# Patient Record
Sex: FEMALE | Race: WHITE | NOT HISPANIC OR LATINO | Employment: FULL TIME | URBAN - METROPOLITAN AREA
[De-identification: names, ages, dates, MRNs, and addresses within clinical notes are randomized per-mention and may not be internally consistent; named-entity substitution may affect disease eponyms.]

---

## 2017-02-17 ENCOUNTER — OFFICE VISIT (OUTPATIENT)
Dept: CARDIOLOGY | Facility: CLINIC | Age: 52
End: 2017-02-17
Payer: COMMERCIAL

## 2017-02-17 VITALS
HEART RATE: 70 BPM | RESPIRATION RATE: 16 BRPM | BODY MASS INDEX: 35.61 KG/M2 | SYSTOLIC BLOOD PRESSURE: 142 MMHG | HEIGHT: 67 IN | OXYGEN SATURATION: 97 % | WEIGHT: 226.88 LBS | DIASTOLIC BLOOD PRESSURE: 80 MMHG

## 2017-02-17 DIAGNOSIS — I44.7 LBBB (LEFT BUNDLE BRANCH BLOCK): ICD-10-CM

## 2017-02-17 DIAGNOSIS — E66.09 NON MORBID OBESITY DUE TO EXCESS CALORIES: ICD-10-CM

## 2017-02-17 DIAGNOSIS — I10 ESSENTIAL HYPERTENSION: Primary | ICD-10-CM

## 2017-02-17 PROCEDURE — 99215 OFFICE O/P EST HI 40 MIN: CPT | Mod: S$GLB,,, | Performed by: INTERNAL MEDICINE

## 2017-02-17 PROCEDURE — 3077F SYST BP >= 140 MM HG: CPT | Mod: S$GLB,,, | Performed by: INTERNAL MEDICINE

## 2017-02-17 PROCEDURE — 99999 PR PBB SHADOW E&M-EST. PATIENT-LVL III: CPT | Mod: PBBFAC,,, | Performed by: INTERNAL MEDICINE

## 2017-02-17 PROCEDURE — 3079F DIAST BP 80-89 MM HG: CPT | Mod: S$GLB,,, | Performed by: INTERNAL MEDICINE

## 2017-02-17 NOTE — PROGRESS NOTES
[facility]    CC: Establish Care    Patient ID: Kwasi Brown is a 51 y.o. female with a past medical history of LBBB, HTN, obesity, who presents for an initial appointment.  She was followed by Dr. Coffman in the past, and now wishes to establish care with me.  Pertinent history events include:     -Feeling very well since her last visit with Dr. Coffman in 2015.  Works out at [employer] 5 days/week.    -Nuclear stress test is negative for ischemia.  EF:61% (10/11/13)  -Has known history of LBBB and HTN. She was seen in the past for these condtions by Dr German.     HPI:  Mrs. Brown states she's been doing well.  Continues to work out 5 days/week with no issues.  Reports no chest pain, SOB, LE edema, claudication, PND, TIA symptoms or syncope.  Taking all medications as prescribed with no issues.      Past Medical History   Diagnosis Date    Hypertension     Obesity      Past Surgical History   Procedure Laterality Date    Knee reconstruction, medial patellar femoral ligament       left     Social History     Social History    Marital status:      Spouse name: N/A    Number of children: N/A    Years of education: N/A     Occupational History     [employer]     Social History Main Topics    Smoking status: Former Smoker     Packs/day: 0.25     Years: 12.00     Quit date: 9/9/1983    Smokeless tobacco: Never Used    Alcohol use Yes      Comment: occasional    Drug use: No    Sexual activity: Not on file     Other Topics Concern    Not on file     Social History Narrative     Family History   Problem Relation Age of Onset    Coronary artery disease      Hypertension      Diabetes      Tuberculosis Mother     COPD Mother     Heart disease Mother 70    Hypertension Mother     Hypertension Father     Heart disease Father 78    Diabetes Father     Diabetes Brother 1     type 1    Heart disease Brother     Hypertension Brother        Review of patient's allergies  "indicates:  No Known Allergies    Medication List with Changes/Refills   Current Medications    BENAZEPRIL-HYDROCHLORTHIAZIDE (LOTENSIN HCT) 10-12.5 MG TAB    Take 1 tablet by mouth once daily.       Review of Systems  Constitution: Denies chills, fever, and sweats.  HENT: Denies headaches or blurry vision.  Cardiovascular: Denies chest pain or irregular heart beat.  Respiratory: Denies cough or shortness of breath.  Gastrointestinal: Denies abdominal pain, nausea, or vomiting.  Musculoskeletal: Denies muscle cramps.  Neurological: Denies dizziness or focal weakness.  Psychiatric/Behavioral: Normal mental status.  Hematologic/Lymphatic: Denies bleeding problem or easy bruising/bleeding.  Skin: Denies rash or suspicious lesions    Physical Examination  Visit Vitals    BP (!) 142/80    Pulse 70    Resp 16    Ht 5' 7" (1.702 m)    Wt 102.9 kg (226 lb 13.7 oz)    SpO2 97%    BMI 35.53 kg/m2       Constitutional: No acute distress, conversant  HEENT: Sclera anicteric, Pupils equal, round and reactive to light, extraocular motions intact, Oropharynx clear  Neck: No JVD, no carotid bruits  Cardiovascular: regular rate and rhythm, no murmur, rubs or gallops, normal S1/S2  Pulmonary: Clear to auscultation bilaterally  Abdominal: Abdomen soft, nontender, nondistended, positive bowel sounds  Extremities: No lower extremity edema   Pulses:  Carotid pulses are 2+ on the right side, and 2+ on the left side.  Radial pulses are 2+ on the right side, and 2+ on the left side.   Femoral pulses are 2+ on the right side, and 2+ on the left side.  Skin: No ecchymosis, erythema, or ulcers  Psych: Alert and oriented x 3, appropriate affect  Neuro: CNII-XII intact, no focal deficits    Labs:  Most Recent Data  CBC:   Lab Results   Component Value Date    WBC 9.33 06/30/2006    HGB 13.4 06/30/2006    HCT 40.4 06/30/2006     06/30/2006    MCV 87.8 06/30/2006    RDW 14.1 06/30/2006     BMP:   Lab Results   Component Value Date    "  09/13/2013    K 4.6 09/13/2013     09/13/2013    CO2 28 09/13/2013    BUN 19 09/13/2013     09/13/2013    CALCIUM 9.7 09/13/2013    MG 2.2 10/06/2010     LFTS;   Lab Results   Component Value Date    PROT 7.2 09/13/2013    ALBUMIN 3.5 09/13/2013    BILITOT 0.4 09/13/2013    AST 16 09/13/2013    ALKPHOS 74 09/13/2013    ALT 17 09/13/2013     COAGS: No results found for: INR, PROTIME, PTT  FLP:   Lab Results   Component Value Date    CHOL 199 09/13/2013    HDL 62 09/13/2013    LDLCALC 125.2 09/13/2013    TRIG 59 09/13/2013    CHOLHDL 31.2 09/13/2013       EKG today:  Normal sinus rhythm  LBBB  LAD  Possible LAE    Echo 9/23/2013:  CONCLUSIONS     1 - Concentric hypertrophy.     2 - Moderately depressed left ventricular systolic function (EF 35-40%).     3 - Normal left ventricular diastolic function.     4 - Normal right ventricular systolic function .     5 - Mild mitral regurgitation.     6 - Mild tricuspid regurgitation.     Nuclear Stress Test 10/11/2013:  Nuclear Quantitative Functional Analysis:   LVEF: 61 % (normal is 55 - 69)  LVED Volume: 125 ml (normal is 60 - 98)  LVES Volume: 48 ml (normal is 20 - 42)    Impression: NORMAL MYOCARDIAL PERFUSION  1. The perfusion scan is free of evidence for myocardial ischemia or injury.   2. There is a mild to moderate intensity fixed defect in the anteroseptal wall of the left ventricle, secondary to breast attenuation.   3. Resting wall motion is physiologic.   4. Resting LV function is normal.  (normal is 55 - 69)  5. The ventricular volumes are normal at rest and stress.   6. The extracardiac distribution of radioactivity is normal.   7. When compared to the previous study from 09/03/2009, no significant changes noted.    Assessment/Plan:  Kwasi Brown is a 51 y.o. female with a past medical history of LBBB, HTN, obesity, who presents for an initial appointment.  Doing well currently with no symptoms.    -Continue current medications  -Schedule echo  and nuclear stress test   -Follow up in 6 months    Total duration of face to face visit time 45 minutes.  Total time spent counseling greater than fifty percent of total visit time.  Counseling included discussion regarding imaging findings, diagnosis, possibilities, treatment options, risks and benefits.  The patient had many questions regarding the options and long-term effects.    Kee Dunn MD, PhD  Interventional Cardiology

## 2017-02-17 NOTE — MR AVS SNAPSHOT
"    [facility]   [address]  [place] [address]  Phone: [phone]                  Kwasi Brown   2017 2:00 PM   Office Visit    Description:  Female : 1965   Provider:  Kee Dunn MD   Department:  [facility]           Reason for Visit     Consult           Diagnoses this Visit        Comments    Essential hypertension    -  Primary     LBBB (left bundle branch block)         Non morbid obesity due to excess calories         BMI 34.0-34.9,adult                To Do List           Goals (5 Years of Data)     None      Follow-Up and Disposition     Return in about 6 months (around 2017).      [facility]     [facility] On Call Nurse Care Line -  Assistance  Registered nurses in the [facility] On Call Center provide clinical advisement, health education, appointment booking, and other advisory services.  Call for this free service at [phone].             Medications           Message regarding Medications     Verify the changes and/or additions to your medication regime listed below are the same as discussed with your clinician today.  If any of these changes or additions are incorrect, please notify your healthcare provider.             Verify that the below list of medications is an accurate representation of the medications you are currently taking.  If none reported, the list may be blank. If incorrect, please contact your healthcare provider. Carry this list with you in case of emergency.           Current Medications     benazepril-hydrochlorthiazide (LOTENSIN HCT) 10-12.5 mg Tab Take 1 tablet by mouth once daily.           Clinical Reference Information           Your Vitals Were     BP Pulse Resp Height Weight SpO2    142/80 70 16 5' 7" (1.702 m) 102.9 kg (226 lb 13.7 oz) 97%    BMI                35.53 kg/m2          Blood Pressure          Most Recent Value    Right Arm BP - Sitting  142/80    Left Arm BP - Sitting  140/82 "    BP  (!)  142/80      Allergies as of 2/17/2017     No Known Allergies      Immunizations Administered on Date of Encounter - 2/17/2017     None      Orders Placed During Today's Visit     Future Labs/Procedures Expected by Expires    2D Echo w/ Color Flow Doppler  8/9/2017 2/17/2018    NM Multi Pharm Stress Cardiac Component  8/9/2017 2/17/2018    NM Myocardial Perfusion Spect Multi Pharmacologic  8/9/2017 2/17/2018      Instructions    Continue current medications  Echo and nuclear stress test   Follow up in 6 months       Language Assistance Services     ATTENTION: Language assistance services are available, free of charge. Please call [phone].      ATENCIÓN: Si habla español, tiene a mckenna disposición servicios gratuitos de asistencia lingüística. Llame al [phone].     CHÚ Ý: N?u b?n nói Ti?ng Vi?t, có các d?ch v? h? tr? ngôn ng? mi?n phí dành cho b?n. G?i s? [phone].         [facility] complies with applicable Federal civil rights laws and does not discriminate on the basis of race, color, national origin, age, disability, or sex.

## 2017-02-17 NOTE — LETTER
February 17, 2017      Wes Coffman MD  [address]  [address]  [place] [address]           [facility]  [address]  [place] [address]  Phone: [phone]          Patient: Kwasi Brown   MR Number: [id number]   YOB: 1965   Date of Visit: 2/17/2017       Dear Dr. Wes Coffman:    Thank you for referring Kwasi Brown to me for evaluation. Attached you will find relevant portions of my assessment and plan of care.    If you have questions, please do not hesitate to call me. I look forward to following Kwasi Brown along with you.    Sincerely,    Kee Dunn MD    Enclosure  CC:  No Recipients    If you would like to receive this communication electronically, please contact [email or web address] or [phone] to request more information on [facility] access.    For providers and/or their staff who would like to refer a patient to [facility], please contact us through our one-stop-shop provider referral line, [facility] , at [phone].    If you feel you have received this communication in error or would no longer like to receive these types of communications, please e-mail [email or web address]

## 2017-10-31 RX ORDER — BENAZEPRIL HYDROCHLORIDE AND HYDROCHLOROTHIAZIDE 10; 12.5 MG/1; MG/1
1 TABLET ORAL DAILY
Qty: 30 TABLET | Refills: 11 | Status: SHIPPED | OUTPATIENT
Start: 2017-10-31 | End: 2019-07-06 | Stop reason: SDUPTHER

## 2019-07-08 RX ORDER — BENAZEPRIL HYDROCHLORIDE AND HYDROCHLOROTHIAZIDE 10; 12.5 MG/1; MG/1
1 TABLET ORAL DAILY
Qty: 30 TABLET | Refills: 0 | Status: SHIPPED | OUTPATIENT
Start: 2019-07-08 | End: 2020-11-04 | Stop reason: SDUPTHER

## 2020-10-30 ENCOUNTER — PATIENT MESSAGE (OUTPATIENT)
Dept: CARDIOLOGY | Facility: CLINIC | Age: 55
End: 2020-10-30

## 2020-10-30 DIAGNOSIS — E78.5 HYPERLIPIDEMIA, UNSPECIFIED HYPERLIPIDEMIA TYPE: ICD-10-CM

## 2020-10-30 DIAGNOSIS — I10 HYPERTENSION, UNSPECIFIED TYPE: Primary | ICD-10-CM

## 2020-11-04 ENCOUNTER — OFFICE VISIT (OUTPATIENT)
Dept: CARDIOLOGY | Facility: CLINIC | Age: 55
End: 2020-11-04
Payer: COMMERCIAL

## 2020-11-04 VITALS
HEART RATE: 63 BPM | HEIGHT: 67 IN | RESPIRATION RATE: 16 BRPM | DIASTOLIC BLOOD PRESSURE: 84 MMHG | OXYGEN SATURATION: 99 % | WEIGHT: 236 LBS | BODY MASS INDEX: 37.04 KG/M2 | SYSTOLIC BLOOD PRESSURE: 134 MMHG

## 2020-11-04 DIAGNOSIS — I10 ESSENTIAL HYPERTENSION: ICD-10-CM

## 2020-11-04 DIAGNOSIS — E66.01 MORBID OBESITY: ICD-10-CM

## 2020-11-04 DIAGNOSIS — E78.5 DYSLIPIDEMIA: Primary | ICD-10-CM

## 2020-11-04 PROCEDURE — 99214 PR OFFICE/OUTPT VISIT, EST, LEVL IV, 30-39 MIN: ICD-10-PCS | Mod: S$GLB,,, | Performed by: INTERNAL MEDICINE

## 2020-11-04 PROCEDURE — 99214 OFFICE O/P EST MOD 30 MIN: CPT | Mod: S$GLB,,, | Performed by: INTERNAL MEDICINE

## 2020-11-04 PROCEDURE — 3079F DIAST BP 80-89 MM HG: CPT | Mod: CPTII,S$GLB,, | Performed by: INTERNAL MEDICINE

## 2020-11-04 PROCEDURE — 3008F BODY MASS INDEX DOCD: CPT | Mod: CPTII,S$GLB,, | Performed by: INTERNAL MEDICINE

## 2020-11-04 PROCEDURE — 3075F PR MOST RECENT SYSTOLIC BLOOD PRESS GE 130-139MM HG: ICD-10-PCS | Mod: CPTII,S$GLB,, | Performed by: INTERNAL MEDICINE

## 2020-11-04 PROCEDURE — 3075F SYST BP GE 130 - 139MM HG: CPT | Mod: CPTII,S$GLB,, | Performed by: INTERNAL MEDICINE

## 2020-11-04 PROCEDURE — 3008F PR BODY MASS INDEX (BMI) DOCUMENTED: ICD-10-PCS | Mod: CPTII,S$GLB,, | Performed by: INTERNAL MEDICINE

## 2020-11-04 PROCEDURE — 3079F PR MOST RECENT DIASTOLIC BLOOD PRESSURE 80-89 MM HG: ICD-10-PCS | Mod: CPTII,S$GLB,, | Performed by: INTERNAL MEDICINE

## 2020-11-04 RX ORDER — FACIAL-BODY WIPES
EACH TOPICAL
COMMUNITY
End: 2021-05-05

## 2020-11-04 RX ORDER — BENAZEPRIL HYDROCHLORIDE AND HYDROCHLOROTHIAZIDE 10; 12.5 MG/1; MG/1
1 TABLET ORAL DAILY
Qty: 90 TABLET | Refills: 3 | Status: SHIPPED | OUTPATIENT
Start: 2020-11-04 | End: 2021-11-29 | Stop reason: SDUPTHER

## 2020-11-04 RX ORDER — CETIRIZINE HYDROCHLORIDE 10 MG/1
10 TABLET ORAL DAILY
COMMUNITY

## 2020-11-04 RX ORDER — GLUCOSAMINE SULFATE 500 MG
TABLET ORAL
COMMUNITY

## 2020-11-04 RX ORDER — FLUTICASONE PROPIONATE 50 MCG
1 SPRAY, SUSPENSION (ML) NASAL DAILY
COMMUNITY

## 2020-11-04 NOTE — PROGRESS NOTES
Subjective:    Patient ID:  Kwasi Brown is a 55 y.o. female patient here for evaluation Hypertension (check up)      History of Present Illness:   Patient is here for follow-up evaluation she developed some seasonal allergies.  Otherwise no occurrence of any angina shortness of breath noted.  She has remained reasonably active with change in weather she is more susceptible for allergic rhinitis and cough.  No sputum production no fevers or chills at this time she does have scant mucoid sputum production blood pressure has been stable no headaches no double vision noted          Review of patient's allergies indicates:  No Known Allergies    Past Medical History:   Diagnosis Date    Hypertension     Obesity      Past Surgical History:   Procedure Laterality Date    KNEE RECONSTRUCTION, MEDIAL PATELLAR FEMORAL LIGAMENT      left     Social History     Tobacco Use    Smoking status: Former Smoker     Packs/day: 0.25     Years: 12.00     Pack years: 3.00     Quit date: 1983     Years since quittin.1    Smokeless tobacco: Never Used   Substance Use Topics    Alcohol use: Yes     Comment: occasional    Drug use: No        Review of Systems:    As noted in HPI in addition      REVIEW OF SYSTEMS  CARDIOVASCULAR: No recent chest pain, palpitations, arm, neck, or jaw pain  RESPIRATORY: No recent fever, cough chills, SOB or congestion  : No blood in the urine  GI: No Nausea, vomiting, constipation, diarrhea, blood, or reflux.  MUSCULOSKELETAL: No myalgias  NEURO: No lightheadedness or dizziness  EYES: No Double vision, blurry, vision or headache   Seasonal allergies with associated allergic rhinitis and postnasal congestion.           Objective:        Vitals:    20 1647   BP: 134/84   Pulse: 63   Resp: 16       Lab Results   Component Value Date    WBC 6.6 2020    HGB 14.1 2020    HCT 42.5 2020     2020    CHOL 249 (H) 2020    TRIG 72 2020    HDL 73  11/03/2020    ALT 14 11/03/2020    AST 12 11/03/2020     11/03/2020    K 4.3 11/03/2020     11/03/2020    CREATININE 0.55 11/03/2020    BUN 16 11/03/2020    CO2 31 11/03/2020    TSH 2.21 08/04/2009        ECHOCARDIOGRAM RESULTS  No results found for this or any previous visit.      CURRENT/PREVIOUS VISIT EKG  Results for orders placed or performed in visit on 10/20/15   IN OFFICE EKG 12-LEAD (to [place])    Collection Time: 10/20/15  7:27 AM    Narrative    Test Reason : I44.7    Vent. Rate : 054 BPM     Atrial Rate : 054 BPM     P-R Int : 190 ms          QRS Dur : 152 ms      QT Int : 472 ms       P-R-T Axes : 013 -51 095 degrees     QTc Int : 447 ms    Sinus bradycardia  Left axis deviation  Left bundle branch block  Abnormal ECG  When compared with ECG of 01-OCT-2014 09:50,  No significant change was found  Confirmed by Samm Mahan MD ([id number]) on 10/21/2015 5:10:06 PM    Referred By:             Confirmed By:Samm Mahan MD     No procedure found.   No results found for this or any previous visit.  No procedure found.    PHYSICAL EXAM  CONSTITUTIONAL: Well built, well nourished in no apparent distress  NECK: no carotid bruit, no JVD  LUNGS: CTA  CHEST WALL: no tenderness, pectus excavatum is present  HEART: regular rate and rhythm, S1, S2 normal, no murmur, click, rub or gallop   ABDOMEN: soft, non-tender; bowel sounds normal; no masses,  no organomegaly  EXTREMITIES: Extremities normal, no edema, no calf tenderness noted  NEURO: AAO X 3    I HAVE REVIEWED :    The vital signs, nurses notes, and all the pertinent radiology and labs.         Current Outpatient Medications   Medication Instructions    benazepril-hydrochlorthiazide (LOTENSIN HCT) 10-12.5 mg Tab 1 tablet, Oral, Daily    benazepril-hydrochlorthiazide (LOTENSIN HCT) 10-12.5 mg Tab 1 tablet, Oral, Daily    cetirizine (ZYRTEC) 10 mg, Oral, Daily    fluticasone propionate (FLONASE) 50 mcg/actuation nasal spray 1 spray, Each Nostril, Daily    lysine  1,000 mg Tab Oral    multivitamin capsule 1 capsule, Oral, Daily    om 3-E-linol-ala-oleic-gla-lip (OMEGA 3-6-9) 40-60-10 mg-mg-unit Cap Oral          Assessment:     Essential hypertension  2.  Left bundle branch block morphology  3.  Seasonal allergies and allergic rhinitis.  4.  Obesity with BMI of 36.96.  5.  Significant dyslipidemia      Plan:     Recommendations at this time is to continue on present regimen she does have high cholesterol with LDL cholesterol of 159 total cholesterol is 249 HDL is 73.  Non HDL cholesterol 182.  In addition to maintaining low-fat low-cholesterol diet and low-salt diet out encourage her to consider starting on a statin therapy.  Arm consider of Crestor for except effectiveness at low doses add start of with a 10 mg once a day and recheck her liver profile.  In the meanwhile strict diet and daily activity arm walking or incorporate some physical active exercise.  Patient at this time prefers to try diet and exercise prior to starting on Crestor therapy recheck lipids in 4 months time and defer initiating Crestor for the time being      No follow-ups on file.

## 2021-04-27 ENCOUNTER — PATIENT MESSAGE (OUTPATIENT)
Dept: CARDIOLOGY | Facility: CLINIC | Age: 56
End: 2021-04-27

## 2021-04-29 ENCOUNTER — PATIENT MESSAGE (OUTPATIENT)
Dept: RESEARCH | Facility: HOSPITAL | Age: 56
End: 2021-04-29

## 2021-04-30 ENCOUNTER — LAB VISIT (OUTPATIENT)
Dept: LAB | Facility: HOSPITAL | Age: 56
End: 2021-04-30
Attending: INTERNAL MEDICINE
Payer: COMMERCIAL

## 2021-04-30 DIAGNOSIS — E78.5 DYSLIPIDEMIA: ICD-10-CM

## 2021-04-30 LAB
CHOLEST SERPL-MCNC: 226 MG/DL (ref 120–199)
CHOLEST/HDLC SERPL: 2.9 {RATIO} (ref 2–5)
HDLC SERPL-MCNC: 78 MG/DL (ref 40–75)
HDLC SERPL: 34.5 % (ref 20–50)
LDLC SERPL CALC-MCNC: 139.2 MG/DL (ref 63–159)
NONHDLC SERPL-MCNC: 148 MG/DL
TRIGL SERPL-MCNC: 44 MG/DL (ref 30–150)

## 2021-04-30 PROCEDURE — 36415 COLL VENOUS BLD VENIPUNCTURE: CPT | Performed by: INTERNAL MEDICINE

## 2021-04-30 PROCEDURE — 80061 LIPID PANEL: CPT | Performed by: INTERNAL MEDICINE

## 2021-05-05 ENCOUNTER — OFFICE VISIT (OUTPATIENT)
Dept: CARDIOLOGY | Facility: CLINIC | Age: 56
End: 2021-05-05
Payer: COMMERCIAL

## 2021-05-05 VITALS
BODY MASS INDEX: 37.2 KG/M2 | SYSTOLIC BLOOD PRESSURE: 124 MMHG | WEIGHT: 237 LBS | HEIGHT: 67 IN | OXYGEN SATURATION: 98 % | DIASTOLIC BLOOD PRESSURE: 78 MMHG | HEART RATE: 73 BPM | RESPIRATION RATE: 16 BRPM

## 2021-05-05 DIAGNOSIS — I44.7 LBBB (LEFT BUNDLE BRANCH BLOCK): ICD-10-CM

## 2021-05-05 DIAGNOSIS — E66.01 MORBID OBESITY: ICD-10-CM

## 2021-05-05 DIAGNOSIS — I10 ESSENTIAL HYPERTENSION: ICD-10-CM

## 2021-05-05 DIAGNOSIS — E78.5 DYSLIPIDEMIA: Primary | ICD-10-CM

## 2021-05-05 PROCEDURE — 99214 PR OFFICE/OUTPT VISIT, EST, LEVL IV, 30-39 MIN: ICD-10-PCS | Mod: S$GLB,,, | Performed by: INTERNAL MEDICINE

## 2021-05-05 PROCEDURE — 3008F PR BODY MASS INDEX (BMI) DOCUMENTED: ICD-10-PCS | Mod: CPTII,S$GLB,, | Performed by: INTERNAL MEDICINE

## 2021-05-05 PROCEDURE — 1126F PR PAIN SEVERITY QUANTIFIED, NO PAIN PRESENT: ICD-10-PCS | Mod: S$GLB,,, | Performed by: INTERNAL MEDICINE

## 2021-05-05 PROCEDURE — 3008F BODY MASS INDEX DOCD: CPT | Mod: CPTII,S$GLB,, | Performed by: INTERNAL MEDICINE

## 2021-05-05 PROCEDURE — 1126F AMNT PAIN NOTED NONE PRSNT: CPT | Mod: S$GLB,,, | Performed by: INTERNAL MEDICINE

## 2021-05-05 PROCEDURE — 99214 OFFICE O/P EST MOD 30 MIN: CPT | Mod: S$GLB,,, | Performed by: INTERNAL MEDICINE

## 2021-09-12 ENCOUNTER — NURSE TRIAGE (OUTPATIENT)
Dept: ADMINISTRATIVE | Facility: CLINIC | Age: 56
End: 2021-09-12

## 2021-11-29 RX ORDER — BENAZEPRIL HYDROCHLORIDE AND HYDROCHLOROTHIAZIDE 10; 12.5 MG/1; MG/1
1 TABLET ORAL DAILY
Qty: 90 TABLET | Refills: 3 | Status: SHIPPED | OUTPATIENT
Start: 2021-11-29 | End: 2022-02-23

## 2022-02-23 ENCOUNTER — OFFICE VISIT (OUTPATIENT)
Dept: CARDIOLOGY | Facility: CLINIC | Age: 57
End: 2022-02-23
Payer: COMMERCIAL

## 2022-02-23 ENCOUNTER — LAB VISIT (OUTPATIENT)
Dept: LAB | Facility: HOSPITAL | Age: 57
End: 2022-02-23
Attending: NURSE PRACTITIONER

## 2022-02-23 VITALS
HEIGHT: 67 IN | SYSTOLIC BLOOD PRESSURE: 142 MMHG | BODY MASS INDEX: 37.67 KG/M2 | WEIGHT: 240 LBS | DIASTOLIC BLOOD PRESSURE: 92 MMHG | HEART RATE: 80 BPM

## 2022-02-23 DIAGNOSIS — E78.5 DYSLIPIDEMIA: ICD-10-CM

## 2022-02-23 DIAGNOSIS — I10 PRIMARY HYPERTENSION: ICD-10-CM

## 2022-02-23 DIAGNOSIS — I44.7 LBBB (LEFT BUNDLE BRANCH BLOCK): ICD-10-CM

## 2022-02-23 DIAGNOSIS — R06.09 DOE (DYSPNEA ON EXERTION): ICD-10-CM

## 2022-02-23 DIAGNOSIS — E66.01 MORBID OBESITY: ICD-10-CM

## 2022-02-23 LAB
ALBUMIN SERPL BCP-MCNC: 4 G/DL (ref 3.5–5.2)
ALP SERPL-CCNC: 60 U/L (ref 55–135)
ALT SERPL W/O P-5'-P-CCNC: 17 U/L (ref 10–44)
ANION GAP SERPL CALC-SCNC: 10 MMOL/L (ref 8–16)
AST SERPL-CCNC: 14 U/L (ref 10–40)
BILIRUB SERPL-MCNC: 0.7 MG/DL (ref 0.1–1)
BUN SERPL-MCNC: 18 MG/DL (ref 6–20)
CALCIUM SERPL-MCNC: 9.4 MG/DL (ref 8.7–10.5)
CHLORIDE SERPL-SCNC: 103 MMOL/L (ref 95–110)
CHOLEST SERPL-MCNC: 213 MG/DL (ref 120–199)
CHOLEST/HDLC SERPL: 2.7 {RATIO} (ref 2–5)
CO2 SERPL-SCNC: 27 MMOL/L (ref 23–29)
CREAT SERPL-MCNC: 0.8 MG/DL (ref 0.5–1.4)
EST. GFR  (AFRICAN AMERICAN): >60 ML/MIN/1.73 M^2
EST. GFR  (NON AFRICAN AMERICAN): >60 ML/MIN/1.73 M^2
GLUCOSE SERPL-MCNC: 116 MG/DL (ref 70–110)
HDLC SERPL-MCNC: 80 MG/DL (ref 40–75)
HDLC SERPL: 37.6 % (ref 20–50)
LDLC SERPL CALC-MCNC: 121.8 MG/DL (ref 63–159)
NONHDLC SERPL-MCNC: 133 MG/DL
POTASSIUM SERPL-SCNC: 4.3 MMOL/L (ref 3.5–5.1)
PROT SERPL-MCNC: 7.3 G/DL (ref 6–8.4)
SODIUM SERPL-SCNC: 140 MMOL/L (ref 136–145)
TRIGL SERPL-MCNC: 56 MG/DL (ref 30–150)

## 2022-02-23 PROCEDURE — 1160F RVW MEDS BY RX/DR IN RCRD: CPT | Mod: CPTII,S$GLB,, | Performed by: INTERNAL MEDICINE

## 2022-02-23 PROCEDURE — 36415 COLL VENOUS BLD VENIPUNCTURE: CPT | Performed by: NURSE PRACTITIONER

## 2022-02-23 PROCEDURE — 3008F PR BODY MASS INDEX (BMI) DOCUMENTED: ICD-10-PCS | Mod: CPTII,S$GLB,, | Performed by: INTERNAL MEDICINE

## 2022-02-23 PROCEDURE — 80061 LIPID PANEL: CPT | Performed by: NURSE PRACTITIONER

## 2022-02-23 PROCEDURE — 3080F PR MOST RECENT DIASTOLIC BLOOD PRESSURE >= 90 MM HG: ICD-10-PCS | Mod: CPTII,S$GLB,, | Performed by: INTERNAL MEDICINE

## 2022-02-23 PROCEDURE — 3077F PR MOST RECENT SYSTOLIC BLOOD PRESSURE >= 140 MM HG: ICD-10-PCS | Mod: CPTII,S$GLB,, | Performed by: INTERNAL MEDICINE

## 2022-02-23 PROCEDURE — 3080F DIAST BP >= 90 MM HG: CPT | Mod: CPTII,S$GLB,, | Performed by: INTERNAL MEDICINE

## 2022-02-23 PROCEDURE — 1159F PR MEDICATION LIST DOCUMENTED IN MEDICAL RECORD: ICD-10-PCS | Mod: CPTII,S$GLB,, | Performed by: INTERNAL MEDICINE

## 2022-02-23 PROCEDURE — 99214 PR OFFICE/OUTPT VISIT, EST, LEVL IV, 30-39 MIN: ICD-10-PCS | Mod: S$GLB,,, | Performed by: INTERNAL MEDICINE

## 2022-02-23 PROCEDURE — 1159F MED LIST DOCD IN RCRD: CPT | Mod: CPTII,S$GLB,, | Performed by: INTERNAL MEDICINE

## 2022-02-23 PROCEDURE — 80053 COMPREHEN METABOLIC PANEL: CPT | Performed by: NURSE PRACTITIONER

## 2022-02-23 PROCEDURE — 3008F BODY MASS INDEX DOCD: CPT | Mod: CPTII,S$GLB,, | Performed by: INTERNAL MEDICINE

## 2022-02-23 PROCEDURE — 99214 OFFICE O/P EST MOD 30 MIN: CPT | Mod: S$GLB,,, | Performed by: INTERNAL MEDICINE

## 2022-02-23 PROCEDURE — 3077F SYST BP >= 140 MM HG: CPT | Mod: CPTII,S$GLB,, | Performed by: INTERNAL MEDICINE

## 2022-02-23 PROCEDURE — 1160F PR REVIEW ALL MEDS BY PRESCRIBER/CLIN PHARMACIST DOCUMENTED: ICD-10-PCS | Mod: CPTII,S$GLB,, | Performed by: INTERNAL MEDICINE

## 2022-02-23 RX ORDER — BENAZEPRIL HYDROCHLORIDE AND HYDROCHLOROTHIAZIDE 20; 12.5 MG/1; MG/1
1 TABLET ORAL DAILY
Qty: 90 TABLET | Refills: 3 | Status: SHIPPED | OUTPATIENT
Start: 2022-02-23 | End: 2022-10-26 | Stop reason: SDUPTHER

## 2022-02-23 NOTE — ASSESSMENT & PLAN NOTE
I would suggest subtle increase in her blood pressure medicines to benazepril hydrochlorothiazide 20/12.5 mg once daily and continue to maintain on low-salt diet and gradual increase in exercise as tolerated.

## 2022-02-23 NOTE — PROGRESS NOTES
Subjective:    Patient ID:  Kwasi Brown is a 56 y.o. female patient here for evaluation Hypertension      History of Present Illness:     Patient is here for follow-up evaluation she has been under tremendous amount of stress the last 6-8 months with work and family related issues in house related issues.  She is coping really good despite all these difficulties.  Are she did develop arm some sciatic pain and low back pain she has been diagnosed with some scoliosis.  Intermittently she has some numbness in the right leg as well on 1 occasion she did notice markedly elevated blood pressure the accuracy is questionable on the arm the cuff itself she did not have any associated headaches visual disturbances no chest pain or shortness of breath.  Subsequent pressures have improved.  Arm currently no nausea vomiting no blood in the stools no black stools are noted.  No headaches or visual changes.        Review of patient's allergies indicates:  No Known Allergies    Past Medical History:   Diagnosis Date    Hypertension     Obesity      Past Surgical History:   Procedure Laterality Date    KNEE RECONSTRUCTION, MEDIAL PATELLAR FEMORAL LIGAMENT      left     Social History     Tobacco Use    Smoking status: Former Smoker     Packs/day: 0.25     Years: 12.00     Pack years: 3.00     Quit date: 1983     Years since quittin.4    Smokeless tobacco: Never Used   Substance Use Topics    Alcohol use: Yes     Comment: occasional    Drug use: No        Review of Systems:    As noted in HPI in addition      REVIEW OF SYSTEMS  CARDIOVASCULAR: No recent chest pain, palpitations, arm, neck, or jaw pain  RESPIRATORY: No recent fever, cough chills, SOB or congestion  : No blood in the urine  GI: No Nausea, vomiting, constipation, diarrhea, blood, or reflux.  MUSCULOSKELETAL: No myalgias  NEURO: No lightheadedness or dizziness  EYES: No Double vision, blurry, vision or headache   Are periodic sinus congestion  noted with change in weather and pressure.           Objective        Vitals:    02/23/22 1533   BP: (!) 142/92   Pulse: 80       LIPIDS - LAST 2   Lab Results   Component Value Date    CHOL 213 (H) 02/23/2022    CHOL 226 (H) 04/30/2021    HDL 80 (H) 02/23/2022    HDL 78 (H) 04/30/2021    LDLCALC 121.8 02/23/2022    LDLCALC 139.2 04/30/2021    TRIG 56 02/23/2022    TRIG 44 04/30/2021    CHOLHDL 37.6 02/23/2022    CHOLHDL 34.5 04/30/2021       CBC - LAST 2  Lab Results   Component Value Date    WBC 6.6 11/03/2020    WBC 9.33 06/30/2006    RBC 4.93 11/03/2020    RBC 4.60 06/30/2006    HGB 14.1 11/03/2020    HGB 13.4 06/30/2006    HCT 42.5 11/03/2020    HCT 40.4 06/30/2006    MCV 86.2 11/03/2020    MCV 87.8 06/30/2006    MCH 28.6 11/03/2020    MCH 29.1 06/30/2006    MCHC 33.2 11/03/2020    MCHC 33.2 06/30/2006    RDW 13.1 11/03/2020    RDW 14.1 06/30/2006     11/03/2020     06/30/2006    MPV 10.8 11/03/2020    MPV 11.2 06/30/2006    GRAN 6.7 06/30/2006    GRAN 71.8 06/30/2006    LYMPH 2,864 11/03/2020    LYMPH 43.4 11/03/2020    MONO 508 11/03/2020    MONO 7.7 11/03/2020    BASO 33 11/03/2020    BASO 0.0 06/30/2006       CHEMISTRY & LIVER FUNCTION - LAST 2  Lab Results   Component Value Date     02/23/2022     11/03/2020    K 4.3 02/23/2022    K 4.3 11/03/2020     02/23/2022     11/03/2020    CO2 27 02/23/2022    CO2 31 11/03/2020    ANIONGAP 10 02/23/2022    ANIONGAP 8 09/13/2013    BUN 18 02/23/2022    BUN 16 11/03/2020    CREATININE 0.8 02/23/2022    CREATININE 0.55 11/03/2020     (H) 02/23/2022    GLU 94 11/03/2020    CALCIUM 9.4 02/23/2022    CALCIUM 9.1 11/03/2020    MG 2.2 10/06/2010    MG 2.3 10/26/2009    ALBUMIN 4.0 02/23/2022    ALBUMIN 4.2 11/03/2020    PROT 7.3 02/23/2022    PROT 6.9 11/03/2020    ALKPHOS 60 02/23/2022    ALKPHOS 74 09/13/2013    ALT 17 02/23/2022    ALT 14 11/03/2020    AST 14 02/23/2022    AST 12 11/03/2020    BILITOT 0.7 02/23/2022    BILITOT  0.4 11/03/2020        CARDIAC PROFILE - LAST 2  No results found for: BNP, CPK, CPKMB, LDH, TROPONINI     COAGULATION - LAST 2  No results found for: LABPT, INR, APTT    ENDOCRINE & PSA - LAST 2  Lab Results   Component Value Date    TSH 2.21 08/04/2009    TSH 2.0 06/30/2006        ECHOCARDIOGRAM RESULTS  No results found for this or any previous visit.      CURRENT/PREVIOUS VISIT EKG  Results for orders placed or performed in visit on 10/20/15   IN OFFICE EKG 12-LEAD (to [place])    Collection Time: 10/20/15  7:27 AM    Narrative    Test Reason : I44.7    Vent. Rate : 054 BPM     Atrial Rate : 054 BPM     P-R Int : 190 ms          QRS Dur : 152 ms      QT Int : 472 ms       P-R-T Axes : 013 -51 095 degrees     QTc Int : 447 ms    Sinus bradycardia  Left axis deviation  Left bundle branch block  Abnormal ECG  When compared with ECG of 01-OCT-2014 09:50,  No significant change was found  Confirmed by Smam Mahan MD ([id number]) on 10/21/2015 5:10:06 PM    Referred By:             Confirmed By:Samm Mahan MD     No valid procedures specified.   No results found for this or any previous visit.    No valid procedures specified.    PHYSICAL EXAM  CONSTITUTIONAL: Well built, well nourished in no apparent distress  NECK: no carotid bruit, no JVD  LUNGS: CTA  CHEST WALL: no tenderness  HEART: regular rate and rhythm, S1, S2 normal, no murmur, click, rub or gallop   ABDOMEN: soft, non-tender; bowel sounds normal; no masses,  no organomegaly  EXTREMITIES: Extremities normal, no edema, no calf tenderness noted  NEURO: AAO X 3    I HAVE REVIEWED :    The vital signs, nurses notes, and all the pertinent radiology and labs.        Current Outpatient Medications   Medication Instructions    benazepril-hydrochlorthiazide (LOTENSIN HCT) 20-12.5 mg per tablet 1 tablet, Oral, Daily    cetirizine (ZYRTEC) 10 mg, Oral, Daily    fluticasone propionate (FLONASE) 50 mcg/actuation nasal spray 1 spray, Each Nostril, Daily    lysine 1,000 mg Tab Oral     multivitamin capsule 1 capsule, Oral, Daily    omega 3-dha-epa-fish oil 60- mg CpDR Oral    UNABLE TO FIND 900 mg, Oral, 4 times daily, medication name: cholestoff plus          Assessment & Plan     Hypertension  I would suggest subtle increase in her blood pressure medicines to benazepril hydrochlorothiazide 20/12.5 mg once daily and continue to maintain on low-salt diet and gradual increase in exercise as tolerated.    LBBB (left bundle branch block)  This is relatively stable no new cardiac symptoms are noted.  No further workup is needed    Dyslipidemia  Her non-HDL cholesterol done this morning was 133 and HDL is good 80 encouraged her to maintain on little bit strict diet and cut back on carbohydrates as well as cutting back on arm cholesterol foods the goal is to get her non-HDL cholesterol below 120 at all times.    Morbid obesity  Calorie restriction and increase daily physical activities    ARREGUIN (dyspnea on exertion)  Secondary to above causes.          No follow-ups on file.

## 2022-02-23 NOTE — ASSESSMENT & PLAN NOTE
Her non-HDL cholesterol done this morning was 133 and HDL is good 80 encouraged her to maintain on little bit strict diet and cut back on carbohydrates as well as cutting back on arm cholesterol foods the goal is to get her non-HDL cholesterol below 120 at all times.

## 2022-08-12 ENCOUNTER — OFFICE VISIT (OUTPATIENT)
Dept: URGENT CARE | Facility: CLINIC | Age: 57
End: 2022-08-12
Payer: COMMERCIAL

## 2022-08-12 VITALS
RESPIRATION RATE: 18 BRPM | BODY MASS INDEX: 37.67 KG/M2 | TEMPERATURE: 99 F | HEIGHT: 67 IN | HEART RATE: 82 BPM | SYSTOLIC BLOOD PRESSURE: 147 MMHG | OXYGEN SATURATION: 95 % | WEIGHT: 240 LBS | DIASTOLIC BLOOD PRESSURE: 97 MMHG

## 2022-08-12 DIAGNOSIS — U07.1 COVID-19: ICD-10-CM

## 2022-08-12 DIAGNOSIS — J02.9 SORE THROAT: ICD-10-CM

## 2022-08-12 DIAGNOSIS — R09.81 NASAL CONGESTION: Primary | ICD-10-CM

## 2022-08-12 DIAGNOSIS — U07.1 COVID-19 VIRUS DETECTED: ICD-10-CM

## 2022-08-12 LAB
CTP QC/QA: YES
SARS-COV-2 AG RESP QL IA.RAPID: POSITIVE

## 2022-08-12 PROCEDURE — 99204 PR OFFICE/OUTPT VISIT, NEW, LEVL IV, 45-59 MIN: ICD-10-PCS | Mod: S$GLB,,, | Performed by: STUDENT IN AN ORGANIZED HEALTH CARE EDUCATION/TRAINING PROGRAM

## 2022-08-12 PROCEDURE — 1159F PR MEDICATION LIST DOCUMENTED IN MEDICAL RECORD: ICD-10-PCS | Mod: CPTII,S$GLB,, | Performed by: STUDENT IN AN ORGANIZED HEALTH CARE EDUCATION/TRAINING PROGRAM

## 2022-08-12 PROCEDURE — 1160F RVW MEDS BY RX/DR IN RCRD: CPT | Mod: CPTII,S$GLB,, | Performed by: STUDENT IN AN ORGANIZED HEALTH CARE EDUCATION/TRAINING PROGRAM

## 2022-08-12 PROCEDURE — 4010F PR ACE/ARB THEARPY RXD/TAKEN: ICD-10-PCS | Mod: CPTII,S$GLB,, | Performed by: STUDENT IN AN ORGANIZED HEALTH CARE EDUCATION/TRAINING PROGRAM

## 2022-08-12 PROCEDURE — 3080F DIAST BP >= 90 MM HG: CPT | Mod: CPTII,S$GLB,, | Performed by: STUDENT IN AN ORGANIZED HEALTH CARE EDUCATION/TRAINING PROGRAM

## 2022-08-12 PROCEDURE — 1159F MED LIST DOCD IN RCRD: CPT | Mod: CPTII,S$GLB,, | Performed by: STUDENT IN AN ORGANIZED HEALTH CARE EDUCATION/TRAINING PROGRAM

## 2022-08-12 PROCEDURE — 87811 SARS-COV-2 COVID19 W/OPTIC: CPT | Mod: QW,S$GLB,, | Performed by: STUDENT IN AN ORGANIZED HEALTH CARE EDUCATION/TRAINING PROGRAM

## 2022-08-12 PROCEDURE — 4010F ACE/ARB THERAPY RXD/TAKEN: CPT | Mod: CPTII,S$GLB,, | Performed by: STUDENT IN AN ORGANIZED HEALTH CARE EDUCATION/TRAINING PROGRAM

## 2022-08-12 PROCEDURE — 1160F PR REVIEW ALL MEDS BY PRESCRIBER/CLIN PHARMACIST DOCUMENTED: ICD-10-PCS | Mod: CPTII,S$GLB,, | Performed by: STUDENT IN AN ORGANIZED HEALTH CARE EDUCATION/TRAINING PROGRAM

## 2022-08-12 PROCEDURE — 3077F PR MOST RECENT SYSTOLIC BLOOD PRESSURE >= 140 MM HG: ICD-10-PCS | Mod: CPTII,S$GLB,, | Performed by: STUDENT IN AN ORGANIZED HEALTH CARE EDUCATION/TRAINING PROGRAM

## 2022-08-12 PROCEDURE — 87811 SARS CORONAVIRUS 2 ANTIGEN POCT, MANUAL READ: ICD-10-PCS | Mod: QW,S$GLB,, | Performed by: STUDENT IN AN ORGANIZED HEALTH CARE EDUCATION/TRAINING PROGRAM

## 2022-08-12 PROCEDURE — 99204 OFFICE O/P NEW MOD 45 MIN: CPT | Mod: S$GLB,,, | Performed by: STUDENT IN AN ORGANIZED HEALTH CARE EDUCATION/TRAINING PROGRAM

## 2022-08-12 PROCEDURE — 3008F BODY MASS INDEX DOCD: CPT | Mod: CPTII,S$GLB,, | Performed by: STUDENT IN AN ORGANIZED HEALTH CARE EDUCATION/TRAINING PROGRAM

## 2022-08-12 PROCEDURE — 3080F PR MOST RECENT DIASTOLIC BLOOD PRESSURE >= 90 MM HG: ICD-10-PCS | Mod: CPTII,S$GLB,, | Performed by: STUDENT IN AN ORGANIZED HEALTH CARE EDUCATION/TRAINING PROGRAM

## 2022-08-12 PROCEDURE — 3008F PR BODY MASS INDEX (BMI) DOCUMENTED: ICD-10-PCS | Mod: CPTII,S$GLB,, | Performed by: STUDENT IN AN ORGANIZED HEALTH CARE EDUCATION/TRAINING PROGRAM

## 2022-08-12 PROCEDURE — 3077F SYST BP >= 140 MM HG: CPT | Mod: CPTII,S$GLB,, | Performed by: STUDENT IN AN ORGANIZED HEALTH CARE EDUCATION/TRAINING PROGRAM

## 2022-08-12 RX ORDER — CHOLECALCIFEROL (VITAMIN D3) 25 MCG
TABLET,CHEWABLE ORAL
COMMUNITY
Start: 2021-11-01 | End: 2022-08-12

## 2022-08-12 NOTE — PROGRESS NOTES
"Subjective:       Patient ID: Kwasi Brown is a 56 y.o. female.    Vitals:  height is 5' 7" (1.702 m) and weight is 108.9 kg (240 lb). Her oral temperature is 98.7 °F (37.1 °C). Her blood pressure is 147/97 (abnormal) and her pulse is 82. Her respiration is 18 and oxygen saturation is 95%.     Chief Complaint: Cough and Nasal Congestion    Patient is a 56-year-old female with a past medical history of hypertension, hyperlipidemia, allergic rhinitis, and left bundle-branch block who presents to clinic for COVID testing.  Patient reports she is not vaccinated.  Patient denies any recent or known sick exposures.  Patient states symptoms x2 days.  Patient states took a home COVID test which was negative.  Patient reports no over-the-counter medications for symptoms at this point.  Patient complaining of fatigue, chills, fever with a temperature max of 100° F, nasal sinus congestion with postnasal drainage, sinus pressure, sore throat, nonproductive cough, and generalized headaches.  Patient denies any acute dizziness, generalized body aches, ear pain, drooling, painful or difficulty swallowing, chest pain or palpitations, shortness for breath, presence of abnormal breath sounds, abdominal pain, nausea or vomiting, diarrhea, urinary symptoms, rash, or change in mentation.  Patient states only medication she currently takes is Benzapril.  Patient reports friends with a physician who recommends her take Paxlovid.    Cough  This is a new problem. The current episode started in the past 7 days. Associated symptoms include chills, a fever (Temperature max 100° F), headaches, postnasal drip and a sore throat. Pertinent negatives include no chest pain, ear pain, myalgias, rash or shortness of breath.       Constitution: Positive for chills, fatigue and fever (Temperature max 100° F).   HENT: Positive for congestion, postnasal drip, sinus pressure and sore throat. Negative for ear pain, drooling and trouble swallowing.  "   Neck: neck negative.   Cardiovascular: Negative.  Negative for chest pain and palpitations.   Eyes: Negative.    Respiratory: Positive for cough. Negative for chest tightness, sputum production and shortness of breath.    Gastrointestinal: Negative.  Negative for abdominal pain, nausea, vomiting and diarrhea.   Endocrine: negative.   Genitourinary: Negative.  Negative for dysuria, frequency and urgency.   Musculoskeletal: Negative.  Negative for muscle ache.   Skin: Negative.  Negative for color change, pale, rash and erythema.   Allergic/Immunologic: Positive for seasonal allergies.   Neurological: Positive for headaches. Negative for dizziness, light-headedness, passing out, disorientation and altered mental status.   Hematologic/Lymphatic: Negative.    Psychiatric/Behavioral: Negative.  Negative for altered mental status, disorientation and confusion.       Objective:      Physical Exam   Constitutional: She is oriented to person, place, and time. She appears well-developed. She is cooperative.  Non-toxic appearance. She does not appear ill. No distress.   HENT:   Head: Normocephalic and atraumatic.   Ears:   Right Ear: Hearing, tympanic membrane, external ear and ear canal normal.   Left Ear: Hearing, tympanic membrane, external ear and ear canal normal.   Nose: Rhinorrhea and congestion present. No mucosal edema or nasal deformity. No epistaxis. Right sinus exhibits no maxillary sinus tenderness and no frontal sinus tenderness. Left sinus exhibits no maxillary sinus tenderness and no frontal sinus tenderness.   Mouth/Throat: Uvula is midline and mucous membranes are normal. Mucous membranes are moist. No trismus in the jaw. Normal dentition. No uvula swelling. Posterior oropharyngeal erythema present. No oropharyngeal exudate. Oropharynx is clear.   Eyes: Conjunctivae and lids are normal. Pupils are equal, round, and reactive to light. Right eye exhibits no discharge. Left eye exhibits no discharge. No  scleral icterus.   Neck: Trachea normal and phonation normal. Neck supple. No neck rigidity present.   Cardiovascular: Normal rate, regular rhythm, normal heart sounds and normal pulses.   Pulmonary/Chest: Effort normal and breath sounds normal. No respiratory distress (Unlabored.  Equal rise and fall of chest.  Able speak in full complete sentences.  No adventitious breath sounds noted.). She has no wheezes. She has no rhonchi. She has no rales.   Abdominal: Normal appearance and bowel sounds are normal. She exhibits no distension. Soft. There is no abdominal tenderness.   Musculoskeletal: Normal range of motion.         General: No deformity. Normal range of motion.      Cervical back: She exhibits no tenderness.   Lymphadenopathy:     She has no cervical adenopathy.   Neurological: She is alert and oriented to person, place, and time. She exhibits normal muscle tone. Coordination normal.   Skin: Skin is warm, dry, intact, not diaphoretic, not pale and no rash. Capillary refill takes 2 to 3 seconds. No erythema   Psychiatric: Her speech is normal and behavior is normal. Judgment and thought content normal.   Nursing note and vitals reviewed.        Assessment:       1. Nasal congestion    2. Sore throat    3. COVID-19          Plan:         Nasal congestion  -     SARS Coronavirus 2 Antigen, POCT Manual Read    Sore throat  -     SARS Coronavirus 2 Antigen, POCT Manual Read    COVID-19    Other orders  -     nirmatrelvir-ritonavir 300 mg (150 mg x 2)-100 mg copackaged tablets (EUA); Take 3 tablets by mouth 2 (two) times daily for 5 days. Each dose contains 2 nirmatrelvir (pink tablets) and 1 ritonavir (white tablet). Take all 3 tablets together  Dispense: 30 tablet; Refill: 0                 Labs:  COVID positive.  Quarantine as directed.  Quarantine information provided to patient.  COVID recommendations provided.  (Vitamin-C, vitamin D3, Pepcid, Zyrtec, zinc)  Tylenol per package instructions for any pain or  fever.  May rotate with Motrin if unable to control pain or fever along with Tylenol.  Monitor home SpO2 and present to emergency department with readings less than 92%.  Take medications as prescribed.  Assure adequate hydration.  Follow-up with PCP in 1-2 days.  Return to clinic as needed.  To ED for any new or acutely worsening symptoms including but not limited to chest pain, palpitations, shortness of breath, or fever greater than 103° F.  Patient in agreement with plan of care.    DISCLAIMER: Please note that my documentation in this Electronic Healthcare Record was produced using speech recognition software and therefore may contain errors related to that software system.These could include grammar, punctuation and spelling errors or the inclusion/exclusion of phrases that were not intended. Garbled syntax, mangled pronouns, and other bizarre constructions may be attributed to that software system.

## 2022-10-26 ENCOUNTER — PATIENT MESSAGE (OUTPATIENT)
Dept: CARDIOLOGY | Facility: CLINIC | Age: 57
End: 2022-10-26
Payer: COMMERCIAL

## 2022-10-26 ENCOUNTER — PATIENT MESSAGE (OUTPATIENT)
Dept: CARDIOLOGY | Facility: CLINIC | Age: 57
End: 2022-10-26

## 2022-10-26 ENCOUNTER — OFFICE VISIT (OUTPATIENT)
Dept: CARDIOLOGY | Facility: CLINIC | Age: 57
End: 2022-10-26
Payer: COMMERCIAL

## 2022-10-26 ENCOUNTER — LAB VISIT (OUTPATIENT)
Dept: LAB | Facility: HOSPITAL | Age: 57
End: 2022-10-26
Payer: COMMERCIAL

## 2022-10-26 VITALS
HEART RATE: 78 BPM | BODY MASS INDEX: 37.35 KG/M2 | WEIGHT: 238 LBS | SYSTOLIC BLOOD PRESSURE: 118 MMHG | OXYGEN SATURATION: 98 % | DIASTOLIC BLOOD PRESSURE: 78 MMHG | HEIGHT: 67 IN

## 2022-10-26 DIAGNOSIS — I10 ESSENTIAL HYPERTENSION: ICD-10-CM

## 2022-10-26 DIAGNOSIS — E78.5 DYSLIPIDEMIA: ICD-10-CM

## 2022-10-26 DIAGNOSIS — R06.09 DOE (DYSPNEA ON EXERTION): ICD-10-CM

## 2022-10-26 DIAGNOSIS — I44.7 LBBB (LEFT BUNDLE BRANCH BLOCK): ICD-10-CM

## 2022-10-26 DIAGNOSIS — E66.01 MORBID OBESITY: ICD-10-CM

## 2022-10-26 DIAGNOSIS — E78.5 DYSLIPIDEMIA: Primary | ICD-10-CM

## 2022-10-26 LAB
ALBUMIN SERPL BCP-MCNC: 4.1 G/DL (ref 3.5–5.2)
ALP SERPL-CCNC: 70 U/L (ref 55–135)
ALT SERPL W/O P-5'-P-CCNC: 20 U/L (ref 10–44)
ANION GAP SERPL CALC-SCNC: 14 MMOL/L (ref 8–16)
AST SERPL-CCNC: 16 U/L (ref 10–40)
BASOPHILS # BLD AUTO: 0.04 K/UL (ref 0–0.2)
BASOPHILS NFR BLD: 0.6 % (ref 0–1.9)
BILIRUB SERPL-MCNC: 0.5 MG/DL (ref 0.1–1)
BUN SERPL-MCNC: 12 MG/DL (ref 6–20)
CALCIUM SERPL-MCNC: 9.3 MG/DL (ref 8.7–10.5)
CHLORIDE SERPL-SCNC: 101 MMOL/L (ref 95–110)
CHOLEST SERPL-MCNC: 219 MG/DL (ref 120–199)
CHOLEST/HDLC SERPL: 2.9 {RATIO} (ref 2–5)
CO2 SERPL-SCNC: 26 MMOL/L (ref 23–29)
CREAT SERPL-MCNC: 0.7 MG/DL (ref 0.5–1.4)
DIFFERENTIAL METHOD: NORMAL
EOSINOPHIL # BLD AUTO: 0.2 K/UL (ref 0–0.5)
EOSINOPHIL NFR BLD: 2.5 % (ref 0–8)
ERYTHROCYTE [DISTWIDTH] IN BLOOD BY AUTOMATED COUNT: 14.3 % (ref 11.5–14.5)
EST. GFR  (NO RACE VARIABLE): >60 ML/MIN/1.73 M^2
GLUCOSE SERPL-MCNC: 101 MG/DL (ref 70–110)
HCT VFR BLD AUTO: 45.5 % (ref 37–48.5)
HDLC SERPL-MCNC: 75 MG/DL (ref 40–75)
HDLC SERPL: 34.2 % (ref 20–50)
HGB BLD-MCNC: 14.9 G/DL (ref 12–16)
IMM GRANULOCYTES # BLD AUTO: 0.02 K/UL (ref 0–0.04)
IMM GRANULOCYTES NFR BLD AUTO: 0.3 % (ref 0–0.5)
LDLC SERPL CALC-MCNC: 130.8 MG/DL (ref 63–159)
LYMPHOCYTES # BLD AUTO: 2.9 K/UL (ref 1–4.8)
LYMPHOCYTES NFR BLD: 43.9 % (ref 18–48)
MCH RBC QN AUTO: 28.5 PG (ref 27–31)
MCHC RBC AUTO-ENTMCNC: 32.7 G/DL (ref 32–36)
MCV RBC AUTO: 87 FL (ref 82–98)
MONOCYTES # BLD AUTO: 0.5 K/UL (ref 0.3–1)
MONOCYTES NFR BLD: 7.6 % (ref 4–15)
NEUTROPHILS # BLD AUTO: 3 K/UL (ref 1.8–7.7)
NEUTROPHILS NFR BLD: 45.1 % (ref 38–73)
NONHDLC SERPL-MCNC: 144 MG/DL
NRBC BLD-RTO: 0 /100 WBC
PLATELET # BLD AUTO: 268 K/UL (ref 150–450)
PMV BLD AUTO: 11.5 FL (ref 9.2–12.9)
POTASSIUM SERPL-SCNC: 3.8 MMOL/L (ref 3.5–5.1)
PROT SERPL-MCNC: 7.8 G/DL (ref 6–8.4)
RBC # BLD AUTO: 5.23 M/UL (ref 4–5.4)
SODIUM SERPL-SCNC: 141 MMOL/L (ref 136–145)
TRIGL SERPL-MCNC: 66 MG/DL (ref 30–150)
WBC # BLD AUTO: 6.68 K/UL (ref 3.9–12.7)

## 2022-10-26 PROCEDURE — 3078F PR MOST RECENT DIASTOLIC BLOOD PRESSURE < 80 MM HG: ICD-10-PCS | Mod: CPTII,S$GLB,, | Performed by: INTERNAL MEDICINE

## 2022-10-26 PROCEDURE — 99213 OFFICE O/P EST LOW 20 MIN: CPT | Mod: S$GLB,,, | Performed by: INTERNAL MEDICINE

## 2022-10-26 PROCEDURE — 1159F MED LIST DOCD IN RCRD: CPT | Mod: CPTII,S$GLB,, | Performed by: INTERNAL MEDICINE

## 2022-10-26 PROCEDURE — 1160F RVW MEDS BY RX/DR IN RCRD: CPT | Mod: CPTII,S$GLB,, | Performed by: INTERNAL MEDICINE

## 2022-10-26 PROCEDURE — 4010F PR ACE/ARB THEARPY RXD/TAKEN: ICD-10-PCS | Mod: CPTII,S$GLB,, | Performed by: INTERNAL MEDICINE

## 2022-10-26 PROCEDURE — 80061 LIPID PANEL: CPT | Performed by: INTERNAL MEDICINE

## 2022-10-26 PROCEDURE — 3074F PR MOST RECENT SYSTOLIC BLOOD PRESSURE < 130 MM HG: ICD-10-PCS | Mod: CPTII,S$GLB,, | Performed by: INTERNAL MEDICINE

## 2022-10-26 PROCEDURE — 4010F ACE/ARB THERAPY RXD/TAKEN: CPT | Mod: CPTII,S$GLB,, | Performed by: INTERNAL MEDICINE

## 2022-10-26 PROCEDURE — 3074F SYST BP LT 130 MM HG: CPT | Mod: CPTII,S$GLB,, | Performed by: INTERNAL MEDICINE

## 2022-10-26 PROCEDURE — 85025 COMPLETE CBC W/AUTO DIFF WBC: CPT | Performed by: INTERNAL MEDICINE

## 2022-10-26 PROCEDURE — 3078F DIAST BP <80 MM HG: CPT | Mod: CPTII,S$GLB,, | Performed by: INTERNAL MEDICINE

## 2022-10-26 PROCEDURE — 36415 COLL VENOUS BLD VENIPUNCTURE: CPT | Mod: PO | Performed by: INTERNAL MEDICINE

## 2022-10-26 PROCEDURE — 1160F PR REVIEW ALL MEDS BY PRESCRIBER/CLIN PHARMACIST DOCUMENTED: ICD-10-PCS | Mod: CPTII,S$GLB,, | Performed by: INTERNAL MEDICINE

## 2022-10-26 PROCEDURE — 1159F PR MEDICATION LIST DOCUMENTED IN MEDICAL RECORD: ICD-10-PCS | Mod: CPTII,S$GLB,, | Performed by: INTERNAL MEDICINE

## 2022-10-26 PROCEDURE — 80053 COMPREHEN METABOLIC PANEL: CPT | Performed by: INTERNAL MEDICINE

## 2022-10-26 PROCEDURE — 99213 PR OFFICE/OUTPT VISIT, EST, LEVL III, 20-29 MIN: ICD-10-PCS | Mod: S$GLB,,, | Performed by: INTERNAL MEDICINE

## 2022-10-26 RX ORDER — BENAZEPRIL HYDROCHLORIDE AND HYDROCHLOROTHIAZIDE 20; 12.5 MG/1; MG/1
1 TABLET ORAL DAILY
Qty: 90 TABLET | Refills: 3 | Status: SHIPPED | OUTPATIENT
Start: 2022-10-26 | End: 2023-06-27

## 2022-10-26 NOTE — ASSESSMENT & PLAN NOTE
She did have mild exacerbation during COVID infection but this seemed to have improved.  Continue diet and exercise.

## 2022-10-26 NOTE — ASSESSMENT & PLAN NOTE
She did have repeat blood work are done today awaiting the results in the meanwhile maintain low-fat low-cholesterol diet.

## 2022-10-26 NOTE — ASSESSMENT & PLAN NOTE
Blood pressure is stable at 1 18/78 mm Hg and she is on conservative treatment with benazepril hydrochlorothiazide 20/12.5 mg once a day along with low-fat low-salt diet and this seemed to be helping her well continue the same.  I would encourage her to add some magnesium oxide 12 regimen to alleviate any muscle cramps secondary to diuretic.

## 2022-10-26 NOTE — ASSESSMENT & PLAN NOTE
BMI is 37.28 kilograms/meter sq are continue on her calorie restricted diet and exercise as tolerated.

## 2022-10-26 NOTE — ASSESSMENT & PLAN NOTE
Jennifer is a 14 year old who is being evaluated via a billable video visit.      How would you like to obtain your AVS? Jerrell  If the video visit is dropped, the invitation should be resent by: Other e-mail: [email or web address]  Will anyone else be joining your video visit? No      Assessment & Plan   Lynne was seen today for depression and anxiety.    Diagnoses and all orders for this visit:    Current moderate episode of major depressive disorder without prior episode (H)  -     sertraline (ZOLOFT) 50 MG tablet; Take 1.5 tablets (75 mg) by mouth daily    Generalized anxiety disorder  -     sertraline (ZOLOFT) 50 MG tablet; Take 1.5 tablets (75 mg) by mouth daily    Obsessive-compulsive disorder, unspecified type  -     sertraline (ZOLOFT) 50 MG tablet; Take 1.5 tablets (75 mg) by mouth daily    Continue Zoloft 75 MG.   Recheck in 6 months or sooner with concerns    Continue to track period. If it continues to be an issue, send us a message.   Follow Up  No follow-ups on file.          Subjective   Jennifer is a 14 year old accompanied by her mother, presenting for the following health issues:  Depression and Anxiety    History of Present Illness       Reason for visit:  Med follow up   Today's LIDA-7 Score: 14     Mental Health Follow-up Visit for anxiety/depression     How is your mood today?     Change in symptoms since last visit: better    New symptoms since last visit:  Menstrual cycle has been off - missed in Sept and Oct    Problems taking medications: No    Who else is on your mental health care team? Therapist and Primary Care Provider - will be seeing a psychiatrist on 10/31 for medication eval.    +++++++++++++++++++++++++++++++++++++++++++++++++++++++++++++++    PHQ 2/21/2022 9/2/2022 10/11/2022   PHQ-9 Total Score 13 - -   Q9: Thoughts of better off dead/self-harm past 2 weeks Not at all - -   PHQ-A Total Score - 13 14   PHQ-A Depressed most days in past year - Yes Yes   PHQ-A Mood affect on daily activities - Somewhat  She is stable on no present therapy   further intervention is needed at this time.   difficult Somewhat difficult   PHQ-A Suicide Ideation past 2 weeks - Not at all Not at all   PHQ-A Suicide Ideation past month - No No   PHQ-A Previous suicide attempt - No No     LIDA-7 SCORE 11/23/2021 7/26/2022 10/11/2022   Total Score - - 14 (moderate anxiety)   Total Score 13 10 14     Patient has been seen previously for anxiety and depression management. At last appointment 9/2, having switched to Effexor XR was not working. Plan was to try Zoloft, working up to 75 MG daily.      Today, patient is concerned about having missed her period 2x. She says her stress has increased with the start of school. Patient is also on birth control. No changes in sleep or appetite. Her mood is about the same. School has been going well. Patient is seeing psychiatry on 10/31 for a medication evaluation.     Mom says it's been going about the same when patient was on Lexapro, yet she hasn't seen anything negative. Patient has been busy with school and her friends. Mom also adds she had irregular periods and PCOS.     Review of Systems   Constitutional, eye, ENT, skin, respiratory, cardiac, and GI are normal except as otherwise noted.    PSFH:  Mom has a history of PCOS, irregular periods, and trouble getting pregnant. No recent change to medical, surgical, or social history.        Objective         Vitals:  No vitals were obtained today due to virtual visit.    Physical Exam   Alert, no acute distress.   HEENT, conjunctivae are clear, TMs are without erythema, pus or fluid. Position and landmarks are normal.  Nose is clear.  Oropharynx is moist and clear, without tonsillar hypertrophy, asymmetry, exudate or lesions.  Neck is supple without adenopathy or thyromegaly.  Lungs have good air entry bilaterally, no wheezes or crackles.  No prolongation of expiratory phase.   No tachypnea, retractions, or increased work of breathing.  Cardiac exam regular rate and rhythm, normal S1 and S2.  Abdomen is soft and nontender, bowel sounds  are present, no hepatosplenomegaly or mass palpable.  Skin, clear without rash    Video-Visit Details    Video Start Time: 7:17 AM    Type of service:  Video Visit    Video End Time:7:34 AM    Originating Location (pt. Location): Home    Distant Location (provider location):  [facility]     Platform used for Video Visit: [employer]    ADDITIONAL HISTORY SUMMARIZED (2): None.  DECISION TO OBTAIN EXTRA INFORMATION (1): None.   RADIOLOGY TESTS (1): None.  LABS (1): None.  MEDICINE TESTS (1): None.  INDEPENDENT REVIEW (2 each): None.     The visit lasted a total of 17 minutes spent on the date of the encounter doing chart review, history and exam, documentation, and further activities as noted above.     IBlank, am scribing for and in the presence of, Dr. Molina.    I, Dr. Molina, personally performed the services described in this documentation, as scribed by Blank Nicholas in my presence, and it is both accurate and complete.    Total data points: 0    Clay Molina MD

## 2022-10-26 NOTE — PROGRESS NOTES
Subjective:    Patient ID:  Kwasi Brown is a 57 y.o. female patient here for evaluation Follow-up and Results (labs)      History of Present Illness:     Patient is here for follow-up evaluation and she is doing reasonably well from cardiac standpoint and blood pressure stable arm denies having chest discomfort shortness of breath palpitations.  It 2 months ago she did have a repeat COVID infection and during that time she did have some shortness of breath a short left and resolved spontaneously.    Lately she has been having some arthritic symptoms in her knees and legs arm and she is trying CBD gummies and this seemed to be helping her.  Intermittently 1 every few days.  Denies having significant sleep disturbances.  Some fatigue and tiredness does persist        Review of patient's allergies indicates:  No Known Allergies    Past Medical History:   Diagnosis Date    Hypertension     Obesity      Past Surgical History:   Procedure Laterality Date    KNEE RECONSTRUCTION, MEDIAL PATELLAR FEMORAL LIGAMENT      left     Social History     Tobacco Use    Smoking status: Former     Packs/day: 0.25     Years: 12.00     Pack years: 3.00     Types: Cigarettes     Quit date: 1983     Years since quittin.1    Smokeless tobacco: Never   Substance Use Topics    Alcohol use: Yes     Comment: occasional    Drug use: No        Review of Systems:    As noted in HPI in addition      REVIEW OF SYSTEMS  CARDIOVASCULAR: No recent chest pain, palpitations, arm, neck, or jaw pain  RESPIRATORY: No recent fever, cough chills, SOB or congestion  : No blood in the urine  GI: No Nausea, vomiting, constipation, diarrhea, blood, or reflux.  MUSCULOSKELETAL: No myalgias arthritic symptoms in both lower extremities.  NEURO: No lightheadedness or dizziness  EYES: No Double vision, blurry, vision or headache              Objective        Vitals:    10/26/22 1433   BP: 118/78   Pulse: 78       LIPIDS - LAST 2   Lab Results    Component Value Date    CHOL 213 (H) 02/23/2022    CHOL 226 (H) 04/30/2021    HDL 80 (H) 02/23/2022    HDL 78 (H) 04/30/2021    LDLCALC 121.8 02/23/2022    LDLCALC 139.2 04/30/2021    TRIG 56 02/23/2022    TRIG 44 04/30/2021    CHOLHDL 37.6 02/23/2022    CHOLHDL 34.5 04/30/2021       CBC - LAST 2  Lab Results   Component Value Date    WBC 6.6 11/03/2020    WBC 9.33 06/30/2006    RBC 4.93 11/03/2020    RBC 4.60 06/30/2006    HGB 14.1 11/03/2020    HGB 13.4 06/30/2006    HCT 42.5 11/03/2020    HCT 40.4 06/30/2006    MCV 86.2 11/03/2020    MCV 87.8 06/30/2006    MCH 28.6 11/03/2020    MCH 29.1 06/30/2006    MCHC 33.2 11/03/2020    MCHC 33.2 06/30/2006    RDW 13.1 11/03/2020    RDW 14.1 06/30/2006     11/03/2020     06/30/2006    MPV 10.8 11/03/2020    MPV 11.2 06/30/2006    GRAN 6.7 06/30/2006    GRAN 71.8 06/30/2006    LYMPH 2,864 11/03/2020    LYMPH 43.4 11/03/2020    MONO 508 11/03/2020    MONO 7.7 11/03/2020    BASO 33 11/03/2020    BASO 0.0 06/30/2006       CHEMISTRY & LIVER FUNCTION - LAST 2  Lab Results   Component Value Date     02/23/2022     11/03/2020    K 4.3 02/23/2022    K 4.3 11/03/2020     02/23/2022     11/03/2020    CO2 27 02/23/2022    CO2 31 11/03/2020    ANIONGAP 10 02/23/2022    ANIONGAP 8 09/13/2013    BUN 18 02/23/2022    BUN 16 11/03/2020    CREATININE 0.8 02/23/2022    CREATININE 0.55 11/03/2020     (H) 02/23/2022    GLU 94 11/03/2020    CALCIUM 9.4 02/23/2022    CALCIUM 9.1 11/03/2020    MG 2.2 10/06/2010    MG 2.3 10/26/2009    ALBUMIN 4.0 02/23/2022    ALBUMIN 4.2 11/03/2020    PROT 7.3 02/23/2022    PROT 6.9 11/03/2020    ALKPHOS 60 02/23/2022    ALKPHOS 74 09/13/2013    ALT 17 02/23/2022    ALT 14 11/03/2020    AST 14 02/23/2022    AST 12 11/03/2020    BILITOT 0.7 02/23/2022    BILITOT 0.4 11/03/2020        CARDIAC PROFILE - LAST 2  No results found for: BNP, CPK, CPKMB, LDH, TROPONINI     COAGULATION - LAST 2  No results found for: LABPT,  INR, APTT    ENDOCRINE & PSA - LAST 2  Lab Results   Component Value Date    TSH 2.21 08/04/2009    TSH 2.0 06/30/2006        ECHOCARDIOGRAM RESULTS  No results found for this or any previous visit.      CURRENT/PREVIOUS VISIT EKG  Results for orders placed or performed in visit on 10/20/15   IN OFFICE EKG 12-LEAD (to POKKT)    Collection Time: 10/20/15  7:27 AM    Narrative    Test Reason : I44.7    Vent. Rate : 054 BPM     Atrial Rate : 054 BPM     P-R Int : 190 ms          QRS Dur : 152 ms      QT Int : 472 ms       P-R-T Axes : 013 -51 095 degrees     QTc Int : 447 ms    Sinus bradycardia  Left axis deviation  Left bundle branch block  Abnormal ECG  When compared with ECG of 01-OCT-2014 09:50,  No significant change was found  Confirmed by Samm Mahan MD ([id number]) on 10/21/2015 5:10:06 PM    Referred By:             Confirmed By:Samm Mahan MD     No valid procedures specified.   No results found for this or any previous visit.    No valid procedures specified.    PHYSICAL EXAM  CONSTITUTIONAL: Well built, well nourished in no apparent distress  NECK: no carotid bruit, no JVD  LUNGS: CTA  CHEST WALL: no tenderness  HEART: regular rate and rhythm, S1, S2 normal, no murmur, click, rub or gallop   ABDOMEN: soft, non-tender; bowel sounds normal; no masses,  no organomegaly  EXTREMITIES: Extremities normal, no edema, no calf tenderness noted  NEURO: AAO X 3    I HAVE REVIEWED :    The vital signs, nurses notes, and all the pertinent radiology and labs.        Current Outpatient Medications   Medication Instructions    benazepril-hydrochlorthiazide (LOTENSIN HCT) 20-12.5 mg per tablet 1 tablet, Oral, Daily    cetirizine (ZYRTEC) 10 mg, Oral, Daily    fluticasone propionate (FLONASE) 50 mcg/actuation nasal spray 1 spray, Each Nostril, Daily    lysine 1,000 mg Tab Oral    multivitamin capsule 1 capsule, Oral, Daily    omega 3-dha-epa-fish oil 60- mg CpDR Oral    UNABLE TO FIND 900 mg, Oral, 4 times daily, medication name:  cholestoff plus          Assessment & Plan     Essential hypertension  Blood pressure is stable at 1 18/78 mm Hg and she is on conservative treatment with benazepril hydrochlorothiazide 20/12.5 mg once a day along with low-fat low-salt diet and this seemed to be helping her well continue the same.  I would encourage her to add some magnesium oxide 12 regimen to alleviate any muscle cramps secondary to diuretic.    LBBB (left bundle branch block)  She is stable on no present therapy   further intervention is needed at this time.    ARREGUIN (dyspnea on exertion)  She did have mild exacerbation during COVID infection but this seemed to have improved.  Continue diet and exercise.    Dyslipidemia  She did have repeat blood work are done today awaiting the results in the meanwhile maintain low-fat low-cholesterol diet.    Morbid obesity  BMI is 37.28 kilograms/meter sq are continue on her calorie restricted diet and exercise as tolerated.          Follow up in about 1 year (around 10/26/2023).

## 2023-06-27 RX ORDER — BENAZEPRIL HYDROCHLORIDE AND HYDROCHLOROTHIAZIDE 20; 12.5 MG/1; MG/1
TABLET ORAL
Qty: 90 TABLET | Refills: 3 | Status: SHIPPED | OUTPATIENT
Start: 2023-06-27

## 2024-07-24 RX ORDER — BENAZEPRIL HYDROCHLORIDE AND HYDROCHLOROTHIAZIDE 20; 12.5 MG/1; MG/1
TABLET ORAL
Qty: 90 TABLET | Refills: 0 | Status: SHIPPED | OUTPATIENT
Start: 2024-07-24

## 2024-09-04 ENCOUNTER — PATIENT MESSAGE (OUTPATIENT)
Dept: CARDIOLOGY | Facility: CLINIC | Age: 59
End: 2024-09-04

## 2024-09-04 DIAGNOSIS — I10 PRIMARY HYPERTENSION: ICD-10-CM

## 2024-09-04 DIAGNOSIS — R06.09 DOE (DYSPNEA ON EXERTION): ICD-10-CM

## 2024-09-04 DIAGNOSIS — E78.5 DYSLIPIDEMIA: Primary | ICD-10-CM

## 2024-09-13 ENCOUNTER — OFFICE VISIT (OUTPATIENT)
Dept: CARDIOLOGY | Facility: CLINIC | Age: 59
End: 2024-09-13
Payer: COMMERCIAL

## 2024-09-13 ENCOUNTER — LAB VISIT (OUTPATIENT)
Dept: LAB | Facility: HOSPITAL | Age: 59
End: 2024-09-13
Attending: INTERNAL MEDICINE
Payer: COMMERCIAL

## 2024-09-13 VITALS
HEIGHT: 67 IN | DIASTOLIC BLOOD PRESSURE: 86 MMHG | HEART RATE: 75 BPM | WEIGHT: 235 LBS | BODY MASS INDEX: 36.88 KG/M2 | SYSTOLIC BLOOD PRESSURE: 134 MMHG | RESPIRATION RATE: 16 BRPM | OXYGEN SATURATION: 98 %

## 2024-09-13 DIAGNOSIS — E78.5 DYSLIPIDEMIA: ICD-10-CM

## 2024-09-13 DIAGNOSIS — I44.7 LBBB (LEFT BUNDLE BRANCH BLOCK): ICD-10-CM

## 2024-09-13 DIAGNOSIS — I10 PRIMARY HYPERTENSION: ICD-10-CM

## 2024-09-13 DIAGNOSIS — R06.09 DOE (DYSPNEA ON EXERTION): ICD-10-CM

## 2024-09-13 DIAGNOSIS — E66.01 MORBID OBESITY: ICD-10-CM

## 2024-09-13 DIAGNOSIS — I10 ESSENTIAL HYPERTENSION: Primary | ICD-10-CM

## 2024-09-13 LAB
ALBUMIN SERPL BCP-MCNC: 4.1 G/DL (ref 3.5–5.2)
ALP SERPL-CCNC: 68 U/L (ref 55–135)
ALT SERPL W/O P-5'-P-CCNC: 24 U/L (ref 10–44)
ANION GAP SERPL CALC-SCNC: 9 MMOL/L (ref 8–16)
AST SERPL-CCNC: 19 U/L (ref 10–40)
BASOPHILS # BLD AUTO: 0.03 K/UL (ref 0–0.2)
BASOPHILS NFR BLD: 0.5 % (ref 0–1.9)
BILIRUB SERPL-MCNC: 0.5 MG/DL (ref 0.1–1)
BUN SERPL-MCNC: 13 MG/DL (ref 8–23)
CALCIUM SERPL-MCNC: 9 MG/DL (ref 8.7–10.5)
CHLORIDE SERPL-SCNC: 103 MMOL/L (ref 95–110)
CHOLEST SERPL-MCNC: 208 MG/DL (ref 120–199)
CHOLEST/HDLC SERPL: 3.3 {RATIO} (ref 2–5)
CO2 SERPL-SCNC: 29 MMOL/L (ref 23–29)
CREAT SERPL-MCNC: 0.6 MG/DL (ref 0.5–1.4)
DIFFERENTIAL METHOD BLD: ABNORMAL
EOSINOPHIL # BLD AUTO: 0.1 K/UL (ref 0–0.5)
EOSINOPHIL NFR BLD: 1.8 % (ref 0–8)
ERYTHROCYTE [DISTWIDTH] IN BLOOD BY AUTOMATED COUNT: 14.2 % (ref 11.5–14.5)
EST. GFR  (NO RACE VARIABLE): >60 ML/MIN/1.73 M^2
GLUCOSE SERPL-MCNC: 112 MG/DL (ref 70–110)
HCT VFR BLD AUTO: 43.2 % (ref 37–48.5)
HDLC SERPL-MCNC: 64 MG/DL (ref 40–75)
HDLC SERPL: 30.8 % (ref 20–50)
HGB BLD-MCNC: 13.8 G/DL (ref 12–16)
IMM GRANULOCYTES # BLD AUTO: 0.01 K/UL (ref 0–0.04)
IMM GRANULOCYTES NFR BLD AUTO: 0.2 % (ref 0–0.5)
LDLC SERPL CALC-MCNC: 129.6 MG/DL (ref 63–159)
LYMPHOCYTES # BLD AUTO: 2 K/UL (ref 1–4.8)
LYMPHOCYTES NFR BLD: 32.7 % (ref 18–48)
MCH RBC QN AUTO: 27.9 PG (ref 27–31)
MCHC RBC AUTO-ENTMCNC: 31.9 G/DL (ref 32–36)
MCV RBC AUTO: 87 FL (ref 82–98)
MONOCYTES # BLD AUTO: 0.6 K/UL (ref 0.3–1)
MONOCYTES NFR BLD: 9.9 % (ref 4–15)
NEUTROPHILS # BLD AUTO: 3.4 K/UL (ref 1.8–7.7)
NEUTROPHILS NFR BLD: 54.9 % (ref 38–73)
NONHDLC SERPL-MCNC: 144 MG/DL
NRBC BLD-RTO: 0 /100 WBC
PLATELET # BLD AUTO: 234 K/UL (ref 150–450)
PMV BLD AUTO: 10.1 FL (ref 9.2–12.9)
POTASSIUM SERPL-SCNC: 3.8 MMOL/L (ref 3.5–5.1)
PROT SERPL-MCNC: 7.5 G/DL (ref 6–8.4)
RBC # BLD AUTO: 4.94 M/UL (ref 4–5.4)
SODIUM SERPL-SCNC: 141 MMOL/L (ref 136–145)
TRIGL SERPL-MCNC: 72 MG/DL (ref 30–150)
WBC # BLD AUTO: 6.18 K/UL (ref 3.9–12.7)

## 2024-09-13 PROCEDURE — 80053 COMPREHEN METABOLIC PANEL: CPT | Performed by: INTERNAL MEDICINE

## 2024-09-13 PROCEDURE — 99999 PR PBB SHADOW E&M-EST. PATIENT-LVL III: CPT | Mod: PBBFAC,,, | Performed by: INTERNAL MEDICINE

## 2024-09-13 PROCEDURE — 80061 LIPID PANEL: CPT | Performed by: INTERNAL MEDICINE

## 2024-09-13 PROCEDURE — 36415 COLL VENOUS BLD VENIPUNCTURE: CPT | Performed by: INTERNAL MEDICINE

## 2024-09-13 PROCEDURE — 85025 COMPLETE CBC W/AUTO DIFF WBC: CPT | Performed by: INTERNAL MEDICINE

## 2024-09-13 RX ORDER — BENAZEPRIL HYDROCHLORIDE AND HYDROCHLOROTHIAZIDE 20; 12.5 MG/1; MG/1
1 TABLET ORAL DAILY
Qty: 90 TABLET | Refills: 3 | Status: SHIPPED | OUTPATIENT
Start: 2024-09-13 | End: 2025-09-13

## 2024-09-13 NOTE — ASSESSMENT & PLAN NOTE
Her BMI is 36.81 kilograms/meter squared encouraged her to maintain on calorie restricted diet and incorporate daily physical activity.

## 2024-09-13 NOTE — PROGRESS NOTES
Subjective:    Patient ID:  Kwasi Brown is a 59 y.o. female patient here for evaluation Follow-up      History of Present Illness:     Year old with history of arterial hypertension obesity seeking follow-up evaluation.  She was last seen in the office in 2022.  Since then she has been doing reasonably well increased amount of stress related her work and family issues but overall she has been coping fairly well.  She did have an episode of gastric distention and improved with belching and burping.    From a cardiac perspective no chest pain arm neck or jaw pain noted no swelling in the lower extremities no significant change in effort capacity        Review of patient's allergies indicates:  No Known Allergies    Past Medical History:   Diagnosis Date    Hypertension     Obesity      Past Surgical History:   Procedure Laterality Date    KNEE RECONSTRUCTION, MEDIAL PATELLAR FEMORAL LIGAMENT      left     Social History     Tobacco Use    Smoking status: Former     Current packs/day: 0.00     Average packs/day: 0.3 packs/day for 12.0 years (3.0 ttl pk-yrs)     Types: Cigarettes     Start date: 1971     Quit date: 1983     Years since quittin.0    Smokeless tobacco: Never   Substance Use Topics    Alcohol use: Yes     Comment: occasional    Drug use: No        Review of Systems:    As noted in HPI in addition      REVIEW OF SYSTEMS  CARDIOVASCULAR: No recent chest pain, palpitations, arm, neck, or jaw pain  RESPIRATORY: No recent fever, cough chills, SOB or congestion  : No blood in the urine  GI: No Nausea, vomiting, constipation, diarrhea, blood, or reflux.  MUSCULOSKELETAL: No myalgias  NEURO: No lightheadedness or dizziness  EYES: No Double vision, blurry, vision or headache              Objective        Vitals:    24 0818   BP: 134/86   Pulse: 75   Resp: 16       LIPIDS - LAST 2   Lab Results   Component Value Date    CHOL 219 (H) 10/26/2022    CHOL 213 (H) 2022    HDL  "75 10/26/2022    HDL 80 (H) 02/23/2022    LDLCALC 130.8 10/26/2022    LDLCALC 121.8 02/23/2022    TRIG 66 10/26/2022    TRIG 56 02/23/2022    CHOLHDL 34.2 10/26/2022    CHOLHDL 37.6 02/23/2022       CBC - LAST 2  Lab Results   Component Value Date    WBC 6.18 09/13/2024    WBC 6.68 10/26/2022    RBC 4.94 09/13/2024    RBC 5.23 10/26/2022    HGB 13.8 09/13/2024    HGB 14.9 10/26/2022    HCT 43.2 09/13/2024    HCT 45.5 10/26/2022    MCV 87 09/13/2024    MCV 87 10/26/2022    MCH 27.9 09/13/2024    MCH 28.5 10/26/2022    MCHC 31.9 (L) 09/13/2024    MCHC 32.7 10/26/2022    RDW 14.2 09/13/2024    RDW 14.3 10/26/2022     09/13/2024     10/26/2022    MPV 10.1 09/13/2024    MPV 11.5 10/26/2022    GRAN 3.4 09/13/2024    GRAN 54.9 09/13/2024    LYMPH 2.0 09/13/2024    LYMPH 32.7 09/13/2024    MONO 0.6 09/13/2024    MONO 9.9 09/13/2024    BASO 0.03 09/13/2024    BASO 0.04 10/26/2022    NRBC 0 09/13/2024    NRBC 0 10/26/2022       CHEMISTRY & LIVER FUNCTION - LAST 2  Lab Results   Component Value Date     10/26/2022     02/23/2022    K 3.8 10/26/2022    K 4.3 02/23/2022     10/26/2022     02/23/2022    CO2 26 10/26/2022    CO2 27 02/23/2022    ANIONGAP 14 10/26/2022    ANIONGAP 10 02/23/2022    BUN 12 10/26/2022    BUN 18 02/23/2022    CREATININE 0.7 10/26/2022    CREATININE 0.8 02/23/2022     10/26/2022     (H) 02/23/2022    CALCIUM 9.3 10/26/2022    CALCIUM 9.4 02/23/2022    MG 2.2 10/06/2010    MG 2.3 10/26/2009    ALBUMIN 4.1 10/26/2022    ALBUMIN 4.0 02/23/2022    PROT 7.8 10/26/2022    PROT 7.3 02/23/2022    ALKPHOS 70 10/26/2022    ALKPHOS 60 02/23/2022    ALT 20 10/26/2022    ALT 17 02/23/2022    AST 16 10/26/2022    AST 14 02/23/2022    BILITOT 0.5 10/26/2022    BILITOT 0.7 02/23/2022        CARDIAC PROFILE - LAST 2  No results found for: "BNP", "CPK", "CPKMB", "LDH", "TROPONINI", "TROPONINIHS"     COAGULATION - LAST 2  No results found for: "LABPT", "INR", " ""APTT"    ENDOCRINE & PSA - LAST 2  Lab Results   Component Value Date    TSH 2.21 08/04/2009    TSH 2.0 06/30/2006        ECHOCARDIOGRAM RESULTS  No results found for this or any previous visit.      CURRENT/PREVIOUS VISIT EKG  Results for orders placed or performed in visit on 10/20/15   IN OFFICE EKG 12-LEAD (to [place])    Collection Time: 10/20/15  7:27 AM    Narrative    Test Reason : I44.7    Vent. Rate : 054 BPM     Atrial Rate : 054 BPM     P-R Int : 190 ms          QRS Dur : 152 ms      QT Int : 472 ms       P-R-T Axes : 013 -51 095 degrees     QTc Int : 447 ms    Sinus bradycardia  Left axis deviation  Left bundle branch block  Abnormal ECG  When compared with ECG of 01-OCT-2014 09:50,  No significant change was found  Confirmed by Samm Mahan MD ([id number]) on 10/21/2015 5:10:06 PM    Referred By:             Confirmed By:Samm Mahan MD     No valid procedures specified.   No results found for this or any previous visit.    No valid procedures specified.    PHYSICAL EXAM  CONSTITUTIONAL: Well built, well nourished in no apparent distress  NECK: no carotid bruit, no JVD  LUNGS: CTA  CHEST WALL: no tenderness  HEART: regular rate and rhythm, S1, S2 normal, soft systolic murmurs noted in the parasternal border   ABDOMEN: soft, non-tender; bowel sounds normal; no masses,  no organomegaly  EXTREMITIES: Extremities normal, no edema, no calf tenderness noted  NEURO: AAO X 3    I HAVE REVIEWED :    The vital signs, nurses notes, and all the pertinent radiology and labs.        Current Outpatient Medications   Medication Instructions    benazepril-hydrochlorthiazide (LOTENSIN HCT) 20-12.5 mg per tablet 1 tablet, Oral, Daily    cetirizine (ZYRTEC) 10 mg, Oral, Daily    fluticasone propionate (FLONASE) 50 mcg/actuation nasal spray 1 spray, Each Nostril, Daily    lysine 1,000 mg Tab Oral    multivitamin capsule 1 capsule, Oral, Daily    omega 3-dha-epa-fish oil 60- mg CpDR Oral    UNABLE TO FIND 900 mg, Oral, 4 times " daily, medication name: cholestoff plus          Assessment & Plan     1. Essential hypertension  Assessment & Plan:  Her blood pressure today is 134/86 mm Hg reasonably well controlled on the present regimen to include benazepril hydrochlorothiazide 20/12.5 mg once a day, maintain on low-salt diet.    Out encourage her to participate in daily walking program again as she was to do before.      2. Dyslipidemia  Assessment & Plan:  She had repeat blood work done this morning the results are still pending at this time continue on omega-3 fish oil capsules low-fat low-cholesterol diet and daily physical activity as tolerated      3. LBBB (left bundle branch block)  Assessment & Plan:  Clinically stable.  No intervention at this time      4. Morbid obesity  Assessment & Plan:  Her BMI is 36.81 kilograms/meter squared encouraged her to maintain on calorie restricted diet and incorporate daily physical activity.      5. ARREGUIN (dyspnea on exertion)  Assessment & Plan:  Clinically stable no significant worsening noted.  Overall capacity has not      Other orders  -     benazepril-hydrochlorthiazide (LOTENSIN HCT) 20-12.5 mg per tablet; Take 1 tablet by mouth once daily.  Dispense: 90 tablet; Refill: 3      Incomplete database labs are pending at this time.    Follow up in about 1 year (around 9/13/2025).

## 2024-09-13 NOTE — ASSESSMENT & PLAN NOTE
Her blood pressure today is 134/86 mm Hg reasonably well controlled on the present regimen to include benazepril hydrochlorothiazide 20/12.5 mg once a day, maintain on low-salt diet.    Out encourage her to participate in daily walking program again as she was to do before.

## 2024-09-13 NOTE — ASSESSMENT & PLAN NOTE
She had repeat blood work done this morning the results are still pending at this time continue on omega-3 fish oil capsules low-fat low-cholesterol diet and daily physical activity as tolerated

## 2025-01-01 ENCOUNTER — DOCUMENTATION ONLY (OUTPATIENT)
Dept: INFUSION THERAPY | Facility: HOSPITAL | Age: 60
End: 2025-01-01

## 2025-01-01 ENCOUNTER — HOSPITAL ENCOUNTER (INPATIENT)
Facility: HOSPITAL | Age: 60
LOS: 1 days | DRG: 871 | End: 2025-05-30
Attending: INTERNAL MEDICINE
Payer: COMMERCIAL

## 2025-01-01 ENCOUNTER — INFUSION (OUTPATIENT)
Dept: INFUSION THERAPY | Facility: HOSPITAL | Age: 60
End: 2025-01-01
Attending: INTERNAL MEDICINE
Payer: COMMERCIAL

## 2025-01-01 ENCOUNTER — OFFICE VISIT (OUTPATIENT)
Dept: HEMATOLOGY/ONCOLOGY | Facility: CLINIC | Age: 60
End: 2025-01-01
Payer: COMMERCIAL

## 2025-01-01 ENCOUNTER — HOSPITAL ENCOUNTER (OUTPATIENT)
Dept: RADIOLOGY | Facility: HOSPITAL | Age: 60
Discharge: HOME | End: 2025-05-26
Attending: NURSE PRACTITIONER
Payer: COMMERCIAL

## 2025-01-01 ENCOUNTER — TELEPHONE (OUTPATIENT)
Dept: HEMATOLOGY/ONCOLOGY | Facility: CLINIC | Age: 60
End: 2025-01-01
Payer: COMMERCIAL

## 2025-01-01 VITALS
OXYGEN SATURATION: 97 % | RESPIRATION RATE: 18 BRPM | TEMPERATURE: 98 F | BODY MASS INDEX: 30.43 KG/M2 | DIASTOLIC BLOOD PRESSURE: 83 MMHG | WEIGHT: 193.88 LBS | SYSTOLIC BLOOD PRESSURE: 148 MMHG | HEIGHT: 67 IN | HEART RATE: 85 BPM

## 2025-01-01 VITALS
SYSTOLIC BLOOD PRESSURE: 126 MMHG | TEMPERATURE: 97 F | BODY MASS INDEX: 30.18 KG/M2 | DIASTOLIC BLOOD PRESSURE: 81 MMHG | RESPIRATION RATE: 18 BRPM | OXYGEN SATURATION: 96 % | WEIGHT: 192.31 LBS | HEIGHT: 67 IN | HEART RATE: 83 BPM

## 2025-01-01 VITALS
DIASTOLIC BLOOD PRESSURE: 67 MMHG | SYSTOLIC BLOOD PRESSURE: 105 MMHG | OXYGEN SATURATION: 96 % | TEMPERATURE: 97 F | BODY MASS INDEX: 30.17 KG/M2 | WEIGHT: 192.25 LBS | HEART RATE: 100 BPM | RESPIRATION RATE: 16 BRPM | HEIGHT: 67 IN

## 2025-01-01 VITALS
OXYGEN SATURATION: 91 % | BODY MASS INDEX: 36.34 KG/M2 | WEIGHT: 231.5 LBS | HEART RATE: 47 BPM | TEMPERATURE: 98 F | HEIGHT: 67 IN | DIASTOLIC BLOOD PRESSURE: 21 MMHG | SYSTOLIC BLOOD PRESSURE: 48 MMHG

## 2025-01-01 DIAGNOSIS — C15.5 MALIGNANT NEOPLASM OF ABDOMINAL ESOPHAGUS: Primary | ICD-10-CM

## 2025-01-01 DIAGNOSIS — A41.9 SEPTIC SHOCK: ICD-10-CM

## 2025-01-01 DIAGNOSIS — D70.1 CHEMOTHERAPY-INDUCED NEUTROPENIA: ICD-10-CM

## 2025-01-01 DIAGNOSIS — E03.9 ACQUIRED HYPOTHYROIDISM: ICD-10-CM

## 2025-01-01 DIAGNOSIS — E86.0 DEHYDRATION: Primary | ICD-10-CM

## 2025-01-01 DIAGNOSIS — D64.81 ANTINEOPLASTIC CHEMOTHERAPY INDUCED ANEMIA: ICD-10-CM

## 2025-01-01 DIAGNOSIS — T45.1X5A CHEMOTHERAPY-INDUCED NEUTROPENIA: ICD-10-CM

## 2025-01-01 DIAGNOSIS — T45.1X5A ANTINEOPLASTIC CHEMOTHERAPY INDUCED ANEMIA: ICD-10-CM

## 2025-01-01 DIAGNOSIS — R07.9 CHEST PAIN: ICD-10-CM

## 2025-01-01 DIAGNOSIS — C15.5 MALIGNANT NEOPLASM OF ABDOMINAL ESOPHAGUS: ICD-10-CM

## 2025-01-01 DIAGNOSIS — R65.21 SEPTIC SHOCK: ICD-10-CM

## 2025-01-01 DIAGNOSIS — D64.9 ANEMIA, UNSPECIFIED TYPE: Primary | ICD-10-CM

## 2025-01-01 LAB
ABORH RETYPE: NORMAL
ALBUMIN SERPL-MCNC: 2.7 G/DL (ref 3.5–5.2)
ALLENS TEST: ABNORMAL
ALLENS TEST: ABNORMAL
ALP SERPL-CCNC: 1545 UNIT/L (ref 55–135)
ALT SERPL-CCNC: 1476 UNIT/L (ref 10–44)
AMMONIA PLAS-SCNC: 131 UMOL/L (ref 10–50)
ANION GAP (SMH): 37 MMOL/L (ref 8–16)
ANION GAP (SMH): 41 MMOL/L (ref 8–16)
ANISOCYTOSIS BLD QL SMEAR: SLIGHT
AST SERPL-CCNC: 7449 UNIT/L (ref 10–40)
BILIRUB SERPL-MCNC: 8.4 MG/DL (ref 0.1–1)
BILIRUB UR QL STRIP.AUTO: NEGATIVE
BUN SERPL-MCNC: 48 MG/DL (ref 8–23)
BUN SERPL-MCNC: 50 MG/DL (ref 8–23)
CALCIUM SERPL-MCNC: 6.9 MG/DL (ref 8.7–10.5)
CALCIUM SERPL-MCNC: 7.3 MG/DL (ref 8.7–10.5)
CHLORIDE SERPL-SCNC: 93 MMOL/L (ref 95–110)
CHLORIDE SERPL-SCNC: 94 MMOL/L (ref 95–110)
CLARITY UR: ABNORMAL
CO2 SERPL-SCNC: 12 MMOL/L (ref 23–29)
CO2 SERPL-SCNC: 14 MMOL/L (ref 23–29)
COLOR UR AUTO: YELLOW
CREAT SERPL-MCNC: 3.1 MG/DL (ref 0.5–1.4)
CREAT SERPL-MCNC: 3.4 MG/DL (ref 0.5–1.4)
DELSYS: ABNORMAL
DELSYS: ABNORMAL
EOSINOPHIL NFR BLD MANUAL: 1 % (ref 0–8)
ERYTHROCYTE [DISTWIDTH] IN BLOOD BY AUTOMATED COUNT: 17.5 % (ref 11.5–14.5)
FIO2: 32
GFR SERPLBLD CREATININE-BSD FMLA CKD-EPI: 15 ML/MIN/1.73/M2
GFR SERPLBLD CREATININE-BSD FMLA CKD-EPI: 17 ML/MIN/1.73/M2
GLUCOSE SERPL-MCNC: 108 MG/DL (ref 70–110)
GLUCOSE SERPL-MCNC: 109 MG/DL (ref 70–110)
GLUCOSE SERPL-MCNC: 88 MG/DL (ref 70–110)
GLUCOSE UR QL STRIP: ABNORMAL
HCO3 UR-SCNC: 10.8 MMOL/L (ref 24–28)
HCO3 UR-SCNC: 8.3 MMOL/L (ref 24–28)
HCT VFR BLD AUTO: 21.1 % (ref 37–48.5)
HCT VFR BLD CALC: 17 %PCV (ref 36–54)
HGB BLD-MCNC: 6.9 GM/DL (ref 12–16)
HGB UR QL STRIP: ABNORMAL
INDIRECT COOMBS: NORMAL
INR PPP: 2.2 (ref 0.8–1.2)
KETONES UR QL STRIP: ABNORMAL
LACTATE SERPL-SCNC: 26.5 MMOL/L (ref 0.5–1.9)
LEUKOCYTE ESTERASE UR QL STRIP: ABNORMAL
LYMPHOCYTES NFR BLD MANUAL: 5 % (ref 18–48)
MAGNESIUM SERPL-MCNC: 2.1 MG/DL (ref 1.6–2.6)
MCH RBC QN AUTO: 29.6 PG (ref 27–31)
MCHC RBC AUTO-ENTMCNC: 32.7 G/DL (ref 32–36)
MCV RBC AUTO: 91 FL (ref 82–98)
METAMYELOCYTES NFR BLD MANUAL: 3 %
MICROSCOPIC COMMENT: ABNORMAL
MODE: ABNORMAL
MONOCYTES NFR BLD MANUAL: 2 % (ref 4–15)
MYELOCYTES NFR BLD MANUAL: 1 %
NEUTROPHILS NFR BLD MANUAL: 67 % (ref 38–73)
NEUTS BAND NFR BLD MANUAL: 21 %
NITRITE UR QL STRIP: NEGATIVE
NUCLEATED RBC (/100WBC) (SMH): 3 /100 WBC
PCO2 BLDA: 15.7 MMHG (ref 35–45)
PCO2 BLDA: 18.7 MMHG (ref 35–45)
PH SMN: 7.26 [PH] (ref 7.35–7.45)
PH SMN: 7.44 [PH] (ref 7.35–7.45)
PH UR STRIP: 6 [PH]
PHOSPHATE SERPL-MCNC: 9.3 MG/DL (ref 2.7–4.5)
PLATELET # BLD AUTO: 47 K/UL (ref 150–450)
PLATELET BLD QL SMEAR: ABNORMAL
PLATELET BLD QL SMEAR: ABNORMAL
PMV BLD AUTO: 11.5 FL (ref 9.2–12.9)
PO2 BLDA: 100 MMHG (ref 80–100)
PO2 BLDA: 106 MMHG (ref 80–100)
POC BE: -13 MMOL/L (ref -2–2)
POC BE: -19 MMOL/L (ref -2–2)
POC IONIZED CALCIUM: 0.76 MMOL/L (ref 1.06–1.42)
POC SATURATED O2: 97 % (ref 95–100)
POC SATURATED O2: 99 % (ref 95–100)
POC TCO2: 11 MMOL/L (ref 23–27)
POC TCO2: 9 MMOL/L (ref 23–27)
POCT GLUCOSE: 65 MG/DL (ref 70–110)
POIKILOCYTOSIS BLD QL SMEAR: SLIGHT
POTASSIUM BLD-SCNC: 5.1 MMOL/L (ref 3.5–5.1)
POTASSIUM SERPL-SCNC: 5.2 MMOL/L (ref 3.5–5.1)
POTASSIUM SERPL-SCNC: 5.4 MMOL/L (ref 3.5–5.1)
PROCALCITONIN SERPL-MCNC: 9.21 NG/ML
PROT SERPL-MCNC: 4.6 GM/DL (ref 6–8.4)
PROT UR QL STRIP: ABNORMAL
PROTHROMBIN TIME: 22.8 SECONDS (ref 9–12.5)
RBC # BLD AUTO: 2.33 M/UL (ref 4–5.4)
RBC #/AREA URNS AUTO: >100 /HPF
RH BLD: NORMAL
SAMPLE: ABNORMAL
SAMPLE: ABNORMAL
SITE: ABNORMAL
SITE: ABNORMAL
SODIUM BLD-SCNC: 137 MMOL/L (ref 136–145)
SODIUM SERPL-SCNC: 145 MMOL/L (ref 136–145)
SODIUM SERPL-SCNC: 146 MMOL/L (ref 136–145)
SP GR UR STRIP: >=1.03
SPECIMEN OUTDATE: NORMAL
SQUAMOUS #/AREA URNS AUTO: 5 /HPF
T4 FREE SERPL-MCNC: 1.12 NG/DL (ref 0.71–1.51)
TSH SERPL-ACNC: 7.87 UIU/ML (ref 0.34–5.6)
UROBILINOGEN UR STRIP-ACNC: NEGATIVE EU/DL
VANCOMYCIN SERPL-MCNC: 17.9 UG/ML (ref ?–80)
WBC # BLD AUTO: 15.22 K/UL (ref 3.9–12.7)
WBC #/AREA URNS AUTO: >100 /HPF
WBC CLUMPS UR QL AUTO: ABNORMAL

## 2025-01-01 PROCEDURE — 25000003 PHARM REV CODE 250: Performed by: STUDENT IN AN ORGANIZED HEALTH CARE EDUCATION/TRAINING PROGRAM

## 2025-01-01 PROCEDURE — 3078F DIAST BP <80 MM HG: CPT | Mod: CPTII,S$GLB,, | Performed by: INTERNAL MEDICINE

## 2025-01-01 PROCEDURE — 74177 CT ABD & PELVIS W/CONTRAST: CPT | Mod: 26,,, | Performed by: RADIOLOGY

## 2025-01-01 PROCEDURE — 63600175 PHARM REV CODE 636 W HCPCS: Performed by: INTERNAL MEDICINE

## 2025-01-01 PROCEDURE — 99999 PR PBB SHADOW E&M-EST. PATIENT-LVL IV: CPT | Mod: PBBFAC,,, | Performed by: INTERNAL MEDICINE

## 2025-01-01 PROCEDURE — 84295 ASSAY OF SERUM SODIUM: CPT

## 2025-01-01 PROCEDURE — 27000221 HC OXYGEN, UP TO 24 HOURS

## 2025-01-01 PROCEDURE — 82803 BLOOD GASES ANY COMBINATION: CPT

## 2025-01-01 PROCEDURE — 63600175 PHARM REV CODE 636 W HCPCS

## 2025-01-01 PROCEDURE — 25500020 PHARM REV CODE 255

## 2025-01-01 PROCEDURE — 85014 HEMATOCRIT: CPT

## 2025-01-01 PROCEDURE — 84132 ASSAY OF SERUM POTASSIUM: CPT

## 2025-01-01 PROCEDURE — 82140 ASSAY OF AMMONIA: CPT | Performed by: INTERNAL MEDICINE

## 2025-01-01 PROCEDURE — 1159F MED LIST DOCD IN RCRD: CPT | Mod: CPTII,S$GLB,, | Performed by: INTERNAL MEDICINE

## 2025-01-01 PROCEDURE — G2211 COMPLEX E/M VISIT ADD ON: HCPCS | Mod: S$GLB,,, | Performed by: INTERNAL MEDICINE

## 2025-01-01 PROCEDURE — 36415 COLL VENOUS BLD VENIPUNCTURE: CPT | Performed by: STUDENT IN AN ORGANIZED HEALTH CARE EDUCATION/TRAINING PROGRAM

## 2025-01-01 PROCEDURE — 4010F ACE/ARB THERAPY RXD/TAKEN: CPT | Mod: CPTII,S$GLB,, | Performed by: INTERNAL MEDICINE

## 2025-01-01 PROCEDURE — 94761 N-INVAS EAR/PLS OXIMETRY MLT: CPT | Mod: XB

## 2025-01-01 PROCEDURE — 3008F BODY MASS INDEX DOCD: CPT | Mod: CPTII,S$GLB,, | Performed by: INTERNAL MEDICINE

## 2025-01-01 PROCEDURE — 87086 URINE CULTURE/COLONY COUNT: CPT

## 2025-01-01 PROCEDURE — 36600 WITHDRAWAL OF ARTERIAL BLOOD: CPT

## 2025-01-01 PROCEDURE — 25000003 PHARM REV CODE 250: Performed by: INTERNAL MEDICINE

## 2025-01-01 PROCEDURE — 84100 ASSAY OF PHOSPHORUS: CPT

## 2025-01-01 PROCEDURE — 82310 ASSAY OF CALCIUM: CPT | Performed by: STUDENT IN AN ORGANIZED HEALTH CARE EDUCATION/TRAINING PROGRAM

## 2025-01-01 PROCEDURE — 96413 CHEMO IV INFUSION 1 HR: CPT

## 2025-01-01 PROCEDURE — 96365 THER/PROPH/DIAG IV INF INIT: CPT

## 2025-01-01 PROCEDURE — 99291 CRITICAL CARE FIRST HOUR: CPT | Mod: ,,, | Performed by: INTERNAL MEDICINE

## 2025-01-01 PROCEDURE — 99900035 HC TECH TIME PER 15 MIN (STAT)

## 2025-01-01 PROCEDURE — 85610 PROTHROMBIN TIME: CPT

## 2025-01-01 PROCEDURE — 84439 ASSAY OF FREE THYROXINE: CPT | Performed by: NURSE PRACTITIONER

## 2025-01-01 PROCEDURE — 86920 COMPATIBILITY TEST SPIN: CPT | Performed by: INTERNAL MEDICINE

## 2025-01-01 PROCEDURE — 80202 ASSAY OF VANCOMYCIN: CPT

## 2025-01-01 PROCEDURE — 3074F SYST BP LT 130 MM HG: CPT | Mod: CPTII,S$GLB,, | Performed by: INTERNAL MEDICINE

## 2025-01-01 PROCEDURE — 94799 UNLISTED PULMONARY SVC/PX: CPT

## 2025-01-01 PROCEDURE — 96361 HYDRATE IV INFUSION ADD-ON: CPT

## 2025-01-01 PROCEDURE — 99900031 HC PATIENT EDUCATION (STAT)

## 2025-01-01 PROCEDURE — 74177 CT ABD & PELVIS W/CONTRAST: CPT | Mod: TC

## 2025-01-01 PROCEDURE — 83605 ASSAY OF LACTIC ACID: CPT

## 2025-01-01 PROCEDURE — 84443 ASSAY THYROID STIM HORMONE: CPT | Performed by: NURSE PRACTITIONER

## 2025-01-01 PROCEDURE — 63600175 PHARM REV CODE 636 W HCPCS: Performed by: STUDENT IN AN ORGANIZED HEALTH CARE EDUCATION/TRAINING PROGRAM

## 2025-01-01 PROCEDURE — 96415 CHEMO IV INFUSION ADDL HR: CPT

## 2025-01-01 PROCEDURE — 86901 BLOOD TYPING SEROLOGIC RH(D): CPT | Performed by: INTERNAL MEDICINE

## 2025-01-01 PROCEDURE — 84145 PROCALCITONIN (PCT): CPT | Performed by: STUDENT IN AN ORGANIZED HEALTH CARE EDUCATION/TRAINING PROGRAM

## 2025-01-01 PROCEDURE — 82330 ASSAY OF CALCIUM: CPT

## 2025-01-01 PROCEDURE — 20000000 HC ICU ROOM

## 2025-01-01 PROCEDURE — 99215 OFFICE O/P EST HI 40 MIN: CPT | Mod: S$GLB,,, | Performed by: INTERNAL MEDICINE

## 2025-01-01 PROCEDURE — 81001 URINALYSIS AUTO W/SCOPE: CPT

## 2025-01-01 PROCEDURE — 71260 CT THORAX DX C+: CPT | Mod: 26,,, | Performed by: RADIOLOGY

## 2025-01-01 PROCEDURE — 25000003 PHARM REV CODE 250

## 2025-01-01 PROCEDURE — 83735 ASSAY OF MAGNESIUM: CPT

## 2025-01-01 PROCEDURE — 96417 CHEMO IV INFUS EACH ADDL SEQ: CPT

## 2025-01-01 PROCEDURE — 85025 COMPLETE CBC W/AUTO DIFF WBC: CPT

## 2025-01-01 PROCEDURE — 80053 COMPREHEN METABOLIC PANEL: CPT

## 2025-01-01 PROCEDURE — 96367 TX/PROPH/DG ADDL SEQ IV INF: CPT

## 2025-01-01 PROCEDURE — 36415 COLL VENOUS BLD VENIPUNCTURE: CPT | Performed by: INTERNAL MEDICINE

## 2025-01-01 RX ORDER — HYDROCODONE BITARTRATE AND ACETAMINOPHEN 5; 325 MG/1; MG/1
1 TABLET ORAL EVERY 6 HOURS PRN
Refills: 0 | Status: DISCONTINUED | OUTPATIENT
Start: 2025-01-01 | End: 2025-01-01

## 2025-01-01 RX ORDER — DIPHENHYDRAMINE HYDROCHLORIDE 50 MG/ML
50 INJECTION, SOLUTION INTRAMUSCULAR; INTRAVENOUS ONCE AS NEEDED
Status: DISCONTINUED | OUTPATIENT
Start: 2025-01-01 | End: 2025-01-01 | Stop reason: HOSPADM

## 2025-01-01 RX ORDER — SODIUM CHLORIDE 0.9 % (FLUSH) 0.9 %
10 SYRINGE (ML) INJECTION
Status: DISCONTINUED | OUTPATIENT
Start: 2025-01-01 | End: 2025-01-01 | Stop reason: HOSPADM

## 2025-01-01 RX ORDER — MUPIROCIN 20 MG/G
OINTMENT TOPICAL 2 TIMES DAILY
Status: DISCONTINUED | OUTPATIENT
Start: 2025-01-01 | End: 2025-01-01

## 2025-01-01 RX ORDER — DIPHENHYDRAMINE HYDROCHLORIDE 50 MG/ML
50 INJECTION, SOLUTION INTRAMUSCULAR; INTRAVENOUS ONCE AS NEEDED
Status: CANCELLED | OUTPATIENT
Start: 2025-01-01

## 2025-01-01 RX ORDER — NOREPINEPHRINE BITARTRATE/D5W 4MG/250ML
PLASTIC BAG, INJECTION (ML) INTRAVENOUS
Status: COMPLETED
Start: 2025-01-01 | End: 2025-01-01

## 2025-01-01 RX ORDER — ONDANSETRON HYDROCHLORIDE 2 MG/ML
4 INJECTION, SOLUTION INTRAVENOUS EVERY 6 HOURS PRN
Status: DISCONTINUED | OUTPATIENT
Start: 2025-01-01 | End: 2025-01-01

## 2025-01-01 RX ORDER — NALOXONE HCL 0.4 MG/ML
0.02 VIAL (ML) INJECTION
Status: DISCONTINUED | OUTPATIENT
Start: 2025-01-01 | End: 2025-01-01

## 2025-01-01 RX ORDER — TALC
6 POWDER (GRAM) TOPICAL NIGHTLY PRN
Status: DISCONTINUED | OUTPATIENT
Start: 2025-01-01 | End: 2025-01-01

## 2025-01-01 RX ORDER — SODIUM,POTASSIUM PHOSPHATES 280-250MG
2 POWDER IN PACKET (EA) ORAL
Status: DISCONTINUED | OUTPATIENT
Start: 2025-01-01 | End: 2025-01-01

## 2025-01-01 RX ORDER — HEPARIN 100 UNIT/ML
500 SYRINGE INTRAVENOUS
Status: CANCELLED | OUTPATIENT
Start: 2025-01-01

## 2025-01-01 RX ORDER — NOREPINEPHRINE BITARTRATE/D5W 4MG/250ML
0-3 PLASTIC BAG, INJECTION (ML) INTRAVENOUS CONTINUOUS
Status: DISCONTINUED | OUTPATIENT
Start: 2025-01-01 | End: 2025-01-01

## 2025-01-01 RX ORDER — ALUMINUM HYDROXIDE, MAGNESIUM HYDROXIDE, AND SIMETHICONE 1200; 120; 1200 MG/30ML; MG/30ML; MG/30ML
30 SUSPENSION ORAL 4 TIMES DAILY PRN
Status: DISCONTINUED | OUTPATIENT
Start: 2025-01-01 | End: 2025-01-01

## 2025-01-01 RX ORDER — ACETAMINOPHEN 325 MG/1
650 TABLET ORAL EVERY 4 HOURS PRN
Status: DISCONTINUED | OUTPATIENT
Start: 2025-01-01 | End: 2025-01-01

## 2025-01-01 RX ORDER — SODIUM BICARBONATE 1 MEQ/ML
150 SYRINGE (ML) INTRAVENOUS ONCE
Status: DISCONTINUED | OUTPATIENT
Start: 2025-01-01 | End: 2025-01-01

## 2025-01-01 RX ORDER — FLUCONAZOLE 2 MG/ML
200 INJECTION, SOLUTION INTRAVENOUS
Status: DISCONTINUED | OUTPATIENT
Start: 2025-01-01 | End: 2025-01-01

## 2025-01-01 RX ORDER — EPINEPHRINE 0.3 MG/.3ML
0.3 INJECTION SUBCUTANEOUS ONCE AS NEEDED
Status: CANCELLED | OUTPATIENT
Start: 2025-01-01

## 2025-01-01 RX ORDER — MORPHINE SULFATE 4 MG/ML
4 INJECTION, SOLUTION INTRAMUSCULAR; INTRAVENOUS
Status: DISCONTINUED | OUTPATIENT
Start: 2025-01-01 | End: 2025-01-01 | Stop reason: HOSPADM

## 2025-01-01 RX ORDER — LORAZEPAM 2 MG/ML
1 INJECTION INTRAMUSCULAR
Status: DISCONTINUED | OUTPATIENT
Start: 2025-01-01 | End: 2025-01-01 | Stop reason: HOSPADM

## 2025-01-01 RX ORDER — SCOPOLAMINE 1 MG/3D
1 PATCH, EXTENDED RELEASE TRANSDERMAL
Status: DISCONTINUED | OUTPATIENT
Start: 2025-01-01 | End: 2025-01-01 | Stop reason: HOSPADM

## 2025-01-01 RX ORDER — PANTOPRAZOLE SODIUM 40 MG/10ML
40 INJECTION, POWDER, LYOPHILIZED, FOR SOLUTION INTRAVENOUS 2 TIMES DAILY
Status: DISCONTINUED | OUTPATIENT
Start: 2025-01-01 | End: 2025-01-01

## 2025-01-01 RX ORDER — AMOXICILLIN 250 MG
1 CAPSULE ORAL DAILY PRN
Status: DISCONTINUED | OUTPATIENT
Start: 2025-01-01 | End: 2025-01-01

## 2025-01-01 RX ORDER — IBUPROFEN 200 MG
800 TABLET ORAL
Status: COMPLETED | OUTPATIENT
Start: 2025-01-01 | End: 2025-01-01

## 2025-01-01 RX ORDER — HEPARIN 100 UNIT/ML
500 SYRINGE INTRAVENOUS
Status: DISCONTINUED | OUTPATIENT
Start: 2025-01-01 | End: 2025-01-01 | Stop reason: HOSPADM

## 2025-01-01 RX ORDER — LORAZEPAM 2 MG/ML
1 INJECTION INTRAMUSCULAR EVERY 4 HOURS PRN
Status: DISCONTINUED | OUTPATIENT
Start: 2025-01-01 | End: 2025-01-01

## 2025-01-01 RX ORDER — METOCLOPRAMIDE HYDROCHLORIDE 5 MG/ML
5 INJECTION INTRAMUSCULAR; INTRAVENOUS EVERY 6 HOURS PRN
Status: DISCONTINUED | OUTPATIENT
Start: 2025-01-01 | End: 2025-01-01

## 2025-01-01 RX ORDER — HYDROCODONE BITARTRATE AND ACETAMINOPHEN 500; 5 MG/1; MG/1
TABLET ORAL
Status: DISCONTINUED | OUTPATIENT
Start: 2025-01-01 | End: 2025-01-01

## 2025-01-01 RX ORDER — LANOLIN ALCOHOL/MO/W.PET/CERES
800 CREAM (GRAM) TOPICAL
Status: DISCONTINUED | OUTPATIENT
Start: 2025-01-01 | End: 2025-01-01

## 2025-01-01 RX ORDER — MEROPENEM 1 G/1
1 INJECTION, POWDER, FOR SOLUTION INTRAVENOUS
Status: DISCONTINUED | OUTPATIENT
Start: 2025-01-01 | End: 2025-01-01

## 2025-01-01 RX ORDER — IBUPROFEN 200 MG
24 TABLET ORAL
Status: DISCONTINUED | OUTPATIENT
Start: 2025-01-01 | End: 2025-01-01

## 2025-01-01 RX ORDER — SODIUM BICARBONATE 1 MEQ/ML
150 SYRINGE (ML) INTRAVENOUS ONCE
Status: COMPLETED | OUTPATIENT
Start: 2025-01-01 | End: 2025-01-01

## 2025-01-01 RX ORDER — GLUCAGON 1 MG
1 KIT INJECTION
Status: DISCONTINUED | OUTPATIENT
Start: 2025-01-01 | End: 2025-01-01

## 2025-01-01 RX ORDER — MORPHINE SULFATE 4 MG/ML
4 INJECTION, SOLUTION INTRAMUSCULAR; INTRAVENOUS
Refills: 0 | Status: DISCONTINUED | OUTPATIENT
Start: 2025-01-01 | End: 2025-01-01

## 2025-01-01 RX ORDER — SODIUM CHLORIDE 0.9 % (FLUSH) 0.9 %
10 SYRINGE (ML) INJECTION
Status: CANCELLED | OUTPATIENT
Start: 2025-01-01

## 2025-01-01 RX ORDER — ACETAMINOPHEN 325 MG/1
650 TABLET ORAL EVERY 8 HOURS PRN
Status: DISCONTINUED | OUTPATIENT
Start: 2025-01-01 | End: 2025-01-01

## 2025-01-01 RX ORDER — SEVELAMER CARBONATE 800 MG/1
800 TABLET, FILM COATED ORAL 3 TIMES DAILY
Status: DISCONTINUED | OUTPATIENT
Start: 2025-01-01 | End: 2025-01-01

## 2025-01-01 RX ORDER — IBUPROFEN 200 MG
16 TABLET ORAL
Status: DISCONTINUED | OUTPATIENT
Start: 2025-01-01 | End: 2025-01-01

## 2025-01-01 RX ORDER — SODIUM BICARBONATE 1 MEQ/ML
SYRINGE (ML) INTRAVENOUS
Status: DISCONTINUED
Start: 2025-01-01 | End: 2025-01-01 | Stop reason: HOSPADM

## 2025-01-01 RX ORDER — EPINEPHRINE 0.3 MG/.3ML
0.3 INJECTION SUBCUTANEOUS ONCE AS NEEDED
Status: DISCONTINUED | OUTPATIENT
Start: 2025-01-01 | End: 2025-01-01 | Stop reason: HOSPADM

## 2025-01-01 RX ORDER — CALCIUM GLUCONATE 20 MG/ML
1 INJECTION, SOLUTION INTRAVENOUS ONCE
Status: DISCONTINUED | OUTPATIENT
Start: 2025-01-01 | End: 2025-01-01

## 2025-01-01 RX ADMIN — HEPARIN 500 UNITS: 100 SYRINGE at 03:05

## 2025-01-01 RX ADMIN — DEXTROSE MONOHYDRATE: 50 INJECTION, SOLUTION INTRAVENOUS at 09:05

## 2025-01-01 RX ADMIN — NOREPINEPHRINE BITARTRATE 0.12 MCG/KG/MIN: 4 INJECTION, SOLUTION INTRAVENOUS at 10:05

## 2025-01-01 RX ADMIN — OXALIPLATIN 260 MG: 5 INJECTION, SOLUTION INTRAVENOUS at 09:05

## 2025-01-01 RX ADMIN — IOHEXOL 100 ML: 350 INJECTION, SOLUTION INTRAVENOUS at 04:05

## 2025-01-01 RX ADMIN — SODIUM BICARBONATE 150 MEQ: 84 INJECTION INTRAVENOUS at 08:05

## 2025-01-01 RX ADMIN — SODIUM CHLORIDE 360 MG: 9 INJECTION, SOLUTION INTRAVENOUS at 08:05

## 2025-01-01 RX ADMIN — PANTOPRAZOLE SODIUM 40 MG: 40 INJECTION, POWDER, FOR SOLUTION INTRAVENOUS at 04:05

## 2025-01-01 RX ADMIN — SODIUM CHLORIDE 100 MG: 9 INJECTION, SOLUTION INTRAVENOUS at 07:05

## 2025-01-01 RX ADMIN — NOREPINEPHRINE BITARTRATE 4 MG: 4 INJECTION, SOLUTION INTRAVENOUS at 02:05

## 2025-01-01 RX ADMIN — NOREPINEPHRINE BITARTRATE 0.2 MCG/KG/MIN: 4 INJECTION, SOLUTION INTRAVENOUS at 02:05

## 2025-01-01 RX ADMIN — SODIUM CHLORIDE 1000 ML: 9 INJECTION, SOLUTION INTRAVENOUS at 12:05

## 2025-01-01 RX ADMIN — MORPHINE SULFATE 4 MG: 4 INJECTION, SOLUTION INTRAMUSCULAR; INTRAVENOUS at 12:05

## 2025-01-01 RX ADMIN — DEXTROSE MONOHYDRATE 12.5 G: 25 INJECTION, SOLUTION INTRAVENOUS at 07:05

## 2025-01-01 RX ADMIN — LORAZEPAM 1 MG: 2 INJECTION INTRAMUSCULAR; INTRAVENOUS at 10:05

## 2025-01-01 RX ADMIN — PALONOSETRON HYDROCHLORIDE 0.25 MG: 0.25 INJECTION, SOLUTION INTRAVENOUS at 09:05

## 2025-01-01 RX ADMIN — SODIUM CHLORIDE: 9 INJECTION, SOLUTION INTRAVENOUS at 08:05

## 2025-01-01 RX ADMIN — IBUPROFEN 800 MG: 200 TABLET, FILM COATED ORAL at 09:05

## 2025-01-01 RX ADMIN — SODIUM CHLORIDE 12 MG: 9 INJECTION, SOLUTION INTRAVENOUS at 01:05

## 2025-01-01 RX ADMIN — MEROPENEM 1 G: 1 INJECTION, POWDER, FOR SOLUTION INTRAVENOUS at 07:05

## 2025-01-01 RX ADMIN — MORPHINE SULFATE 4 MG: 4 INJECTION, SOLUTION INTRAMUSCULAR; INTRAVENOUS at 10:05

## 2025-01-01 RX ADMIN — FLUCONAZOLE 200 MG: 2 INJECTION, SOLUTION INTRAVENOUS at 04:05

## 2025-01-01 RX ADMIN — SCOPOLAMINE 1 PATCH: 1.5 PATCH, EXTENDED RELEASE TRANSDERMAL at 10:05

## 2025-01-01 RX ADMIN — LORAZEPAM 1 MG: 2 INJECTION INTRAMUSCULAR; INTRAVENOUS at 12:05

## 2025-01-01 RX ADMIN — SODIUM BICARBONATE: 84 INJECTION, SOLUTION INTRAVENOUS at 04:05

## 2025-01-01 RX ADMIN — MORPHINE SULFATE 4 MG: 4 INJECTION, SOLUTION INTRAMUSCULAR; INTRAVENOUS at 11:05

## 2025-01-01 RX ADMIN — HEPARIN 500 UNITS: 100 SYRINGE at 11:05

## 2025-01-03 DIAGNOSIS — K22.89 ESOPHAGEAL MASS: Primary | ICD-10-CM

## 2025-01-07 ENCOUNTER — HOSPITAL ENCOUNTER (OUTPATIENT)
Dept: RADIOLOGY | Facility: HOSPITAL | Age: 60
Discharge: HOME | End: 2025-01-07
Attending: INTERNAL MEDICINE
Payer: COMMERCIAL

## 2025-01-07 ENCOUNTER — TELEPHONE (OUTPATIENT)
Facility: CLINIC | Age: 60
End: 2025-01-07
Payer: COMMERCIAL

## 2025-01-07 DIAGNOSIS — R13.10 DYSPHAGIA: Primary | ICD-10-CM

## 2025-01-07 DIAGNOSIS — R63.4 WEIGHT LOSS: ICD-10-CM

## 2025-01-07 DIAGNOSIS — K22.89 ESOPHAGEAL MASS: ICD-10-CM

## 2025-01-07 DIAGNOSIS — R13.10 DYSPHAGIA: ICD-10-CM

## 2025-01-07 LAB
CREAT SERPL-MCNC: 0.7 MG/DL (ref 0.5–1.4)
SAMPLE: NORMAL

## 2025-01-07 PROCEDURE — 74178 CT ABD&PLV WO CNTR FLWD CNTR: CPT | Mod: TC

## 2025-01-07 PROCEDURE — 71250 CT THORAX DX C-: CPT | Mod: 26,,, | Performed by: RADIOLOGY

## 2025-01-07 PROCEDURE — 74178 CT ABD&PLV WO CNTR FLWD CNTR: CPT | Mod: 26,,, | Performed by: RADIOLOGY

## 2025-01-07 PROCEDURE — 71250 CT THORAX DX C-: CPT | Mod: TC

## 2025-01-07 PROCEDURE — 25500020 PHARM REV CODE 255

## 2025-01-07 RX ADMIN — IOHEXOL 30 ML: 300 INJECTION, SOLUTION INTRAVENOUS at 04:01

## 2025-01-07 RX ADMIN — IOHEXOL 100 ML: 350 INJECTION, SOLUTION INTRAVENOUS at 04:01

## 2025-01-07 NOTE — NURSING
Oncology Navigation   Intake  Type of Referral: External  Date of Referral: 01/03/25  Initial Nurse Navigator Contact: 01/07/25  Referral to Initial Contact Timeline (days): 4  Reason if booked > 7 days after scheduling: Waiting for surgery / transplant / biopsy; Waiting on records     Treatment  Current Status: Staging work-up             Procedures: Biopsy; CT  Biopsy Schedule Date: 01/03/25 (path pending)  CT Schedule Date: 01/07/25                   Left message notifying patient of referral to schedule with Dr. Whitaker from Dr. Tan. Call back number given.

## 2025-01-08 ENCOUNTER — TELEPHONE (OUTPATIENT)
Facility: CLINIC | Age: 60
End: 2025-01-08
Payer: COMMERCIAL

## 2025-01-08 DIAGNOSIS — K22.89 ESOPHAGEAL MASS: Primary | ICD-10-CM

## 2025-01-08 NOTE — NURSING
Oncology Navigation   Intake  Type of Referral: External  Date of Referral: 01/03/25  Initial Nurse Navigator Contact: 01/07/25  Referral to Initial Contact Timeline (days): 4  Appointment Date: 01/13/25  Multiple appointments: Yes (MULTI DISCIPLINARY ClINIC)  Reason if booked > 7 days after scheduling: Specific provider / access; Waiting on records; Waiting for surgery / transplant / biopsy     Treatment  Current Status: Staging work-up    Surgical Oncologist: DR. RAD WHITLEY  Consult Date: 01/13/25    Medical Oncologist: DR. VLADIMIR DE OLIVEIRA  Consult Date: 01/13/25    Radiation Oncologist: DR. COBB    Procedures: Biopsy; CT  Biopsy Schedule Date: 01/03/25 (path pending)  CT Schedule Date: 01/07/25          Radiation Oncologist: DR. COBB    Support Systems: Spouse/significant other; Friends / neighbors  Barriers of Care: Financial concerns  Financial Concerns: Prior authorization pending; Other (GENERAL COST)       Scheduled patient for multi- disciplinary clinic on 1/13/2025. Time, date and location reviewed. Scheduled patient PET scan for 1/16/2025 in [place]. Encouraged patient to call with any questions or concerns.

## 2025-01-10 NOTE — PROGRESS NOTES
Surgical Oncology History and Physical    Encounter Date:  2025    Patient ID: Kwasi Brown  Age:  59 y.o. :  1965    Chief Complaint:  Esophageal Mass      History:    Ms. Brown is a 59 y.o. female who presents for evaluation of a newly diagnosed esophageal mass. She reports a history of 2-3 months of progressive dysphagia and significant weight loss. EGD on  showed a large fungating partially obstructing mass in the lower third of the esophagus. Biopsy results are still pending.     Past Medical History:   Diagnosis Date    Hypertension     Obesity      Past Surgical History:   Procedure Laterality Date    KNEE RECONSTRUCTION, MEDIAL PATELLAR FEMORAL LIGAMENT      left     Current Outpatient Medications on File Prior to Visit   Medication Sig Dispense Refill    benazepril-hydrochlorthiazide (LOTENSIN HCT) 20-12.5 mg per tablet Take 1 tablet by mouth once daily. 90 tablet 3    cetirizine (ZYRTEC) 10 MG tablet Take 10 mg by mouth once daily.      fluticasone propionate (FLONASE) 50 mcg/actuation nasal spray 1 spray by Each Nostril route once daily.      lysine 1,000 mg Tab Take by mouth.      multivitamin capsule Take 1 capsule by mouth once daily.      omega 3-dha-epa-fish oil 60- mg CpDR Take by mouth.      UNABLE TO FIND Take 900 mg by mouth 4 (four) times daily. medication name: cholestoff plus       No current facility-administered medications on file prior to visit.     Review of patient's allergies indicates:  No Known Allergies    Family History:  Her family history includes COPD in her mother; Coronary artery disease in her unknown relative; Diabetes in her father and unknown relative; Diabetes (age of onset: 1) in her brother; Heart disease in her brother; Heart disease (age of onset: 70) in her mother; Heart disease (age of onset: 78) in her father; Hypertension in her brother, father, mother, and unknown relative; Tuberculosis in her mother.     Social History:  She reports  that she quit smoking about 41 years ago. She started smoking about 53 years ago. She has a 3 pack-year smoking history. She has never used smokeless tobacco. She reports current alcohol use. She reports that she does not use drugs.     ROS:     Review of Systems  Pertinent positive/negatives detailed in HPI, all other systems negative.     Physical Exam:  There were no vitals taken for this visit.    Physical Exam    Constitutional:  Non-toxic, no acute distress.  Performance status: ECOG 0  Eyes:  Sclerae anicteric, gaze symmetrical  Neck:  Trachea midline, thyroid, non enlarged without palpable nodules,  FROM  Resp:  Easy work of breathing, no wheezes  CV:  Regular pulse, no JVD  Abd:  Soft, non-tender, no masses, no hepatosplenomegaly, no ascites, no superficial varices  Lymphatics:  No cervical, supraclavicular, axillary, or inguinal lymphadenopathy  Musculoskeletal:  Ambulatory, normal gait, no muscle wasting  Neuro:  No gross deficits  Psych:  Awake, alert, oriented.  Answers and asks questions appropriately    Data:     Radiology:  I personally reviewed these images: I reviewed her CT of the Chest Abdomen and Pelvis. There is a bulky mass of the distal esophagus with bulky gastrohepatic ligament lymphadenopathy. The mass appears to invade the pericardium. There appears to be an extraregional metastatic paraaortic lymph node.     Endoscopy:  Large, fungating, and ulcerated mass in the lower third of the esophagus.     Pathology: pending    Assessment: This is a 59 year old woman with progressive dysphagia and a newly diagnosed large partially obstructing distal esophageal mass with bulky regional adenopathy as well what is likely a distant metastatic lymph node. This is most likely adenocarcinoma based on the location, but pathology is pending. Regardless of the diagnosis, surgical resection is probably off the table given the extraregional lymphadenopathy. She may benefit from a feeding tube prior to  treatment. A gastrostomy tube would be ok in this case since there is no plan for future surgical resection.     Plan:  - She will think about a G tube, and once we have the pathology and PET CT results, she will decide if this is something she wants. I told her this could also be done by one of the surgeons in [place] if she would prefer.   - I discussed the plan with Dr. Zaldivar and Dr. Banks.     Hal Anderson MD  Surgical Oncology  [facility] 1965

## 2025-01-13 ENCOUNTER — TELEPHONE (OUTPATIENT)
Facility: CLINIC | Age: 60
End: 2025-01-13
Payer: COMMERCIAL

## 2025-01-13 ENCOUNTER — OFFICE VISIT (OUTPATIENT)
Facility: CLINIC | Age: 60
End: 2025-01-13
Payer: COMMERCIAL

## 2025-01-13 VITALS
HEART RATE: 83 BPM | SYSTOLIC BLOOD PRESSURE: 146 MMHG | WEIGHT: 216.38 LBS | TEMPERATURE: 98 F | WEIGHT: 216.38 LBS | SYSTOLIC BLOOD PRESSURE: 146 MMHG | DIASTOLIC BLOOD PRESSURE: 85 MMHG | BODY MASS INDEX: 33.89 KG/M2 | RESPIRATION RATE: 16 BRPM | HEART RATE: 83 BPM | WEIGHT: 216.38 LBS | DIASTOLIC BLOOD PRESSURE: 85 MMHG | BODY MASS INDEX: 33.89 KG/M2 | BODY MASS INDEX: 33.89 KG/M2 | SYSTOLIC BLOOD PRESSURE: 146 MMHG | TEMPERATURE: 98 F | HEART RATE: 83 BPM | TEMPERATURE: 98 F | RESPIRATION RATE: 16 BRPM | DIASTOLIC BLOOD PRESSURE: 85 MMHG | RESPIRATION RATE: 16 BRPM

## 2025-01-13 DIAGNOSIS — K22.89 ESOPHAGEAL MASS: Primary | ICD-10-CM

## 2025-01-13 DIAGNOSIS — K22.89 ESOPHAGEAL MASS: ICD-10-CM

## 2025-01-13 DIAGNOSIS — F41.9 ANXIETY: Primary | ICD-10-CM

## 2025-01-13 PROCEDURE — 99205 OFFICE O/P NEW HI 60 MIN: CPT | Mod: S$GLB,,, | Performed by: RADIOLOGY

## 2025-01-13 PROCEDURE — 99205 OFFICE O/P NEW HI 60 MIN: CPT | Mod: S$GLB,,, | Performed by: INTERNAL MEDICINE

## 2025-01-13 PROCEDURE — 3008F BODY MASS INDEX DOCD: CPT | Mod: CPTII,S$GLB,, | Performed by: INTERNAL MEDICINE

## 2025-01-13 PROCEDURE — 3079F DIAST BP 80-89 MM HG: CPT | Mod: CPTII,S$GLB,, | Performed by: RADIOLOGY

## 2025-01-13 PROCEDURE — 3008F BODY MASS INDEX DOCD: CPT | Mod: CPTII,S$GLB,, | Performed by: SURGERY

## 2025-01-13 PROCEDURE — 3077F SYST BP >= 140 MM HG: CPT | Mod: CPTII,S$GLB,, | Performed by: SURGERY

## 2025-01-13 PROCEDURE — 3008F BODY MASS INDEX DOCD: CPT | Mod: CPTII,S$GLB,, | Performed by: RADIOLOGY

## 2025-01-13 PROCEDURE — 1159F MED LIST DOCD IN RCRD: CPT | Mod: CPTII,S$GLB,, | Performed by: INTERNAL MEDICINE

## 2025-01-13 PROCEDURE — G2211 COMPLEX E/M VISIT ADD ON: HCPCS | Mod: S$GLB,,, | Performed by: RADIOLOGY

## 2025-01-13 PROCEDURE — 3079F DIAST BP 80-89 MM HG: CPT | Mod: CPTII,S$GLB,, | Performed by: INTERNAL MEDICINE

## 2025-01-13 PROCEDURE — 3077F SYST BP >= 140 MM HG: CPT | Mod: CPTII,S$GLB,, | Performed by: INTERNAL MEDICINE

## 2025-01-13 PROCEDURE — 99999 PR PBB SHADOW E&M-EST. PATIENT-LVL III: CPT | Mod: PBBFAC,,, | Performed by: INTERNAL MEDICINE

## 2025-01-13 PROCEDURE — 99205 OFFICE O/P NEW HI 60 MIN: CPT | Mod: S$GLB,,, | Performed by: SURGERY

## 2025-01-13 PROCEDURE — 1160F RVW MEDS BY RX/DR IN RCRD: CPT | Mod: CPTII,S$GLB,, | Performed by: INTERNAL MEDICINE

## 2025-01-13 PROCEDURE — 99999 PR PBB SHADOW E&M-EST. PATIENT-LVL III: CPT | Mod: PBBFAC,,, | Performed by: SURGERY

## 2025-01-13 PROCEDURE — 99999 PR PBB SHADOW E&M-EST. PATIENT-LVL III: CPT | Mod: PBBFAC,,,

## 2025-01-13 PROCEDURE — 3079F DIAST BP 80-89 MM HG: CPT | Mod: CPTII,S$GLB,, | Performed by: SURGERY

## 2025-01-13 PROCEDURE — 3077F SYST BP >= 140 MM HG: CPT | Mod: CPTII,S$GLB,, | Performed by: RADIOLOGY

## 2025-01-13 RX ORDER — ONDANSETRON HYDROCHLORIDE 8 MG/1
8 TABLET, FILM COATED ORAL EVERY 12 HOURS PRN
Qty: 30 TABLET | Refills: 2 | Status: SHIPPED | OUTPATIENT
Start: 2025-01-13 | End: 2026-01-13

## 2025-01-13 RX ORDER — ALPRAZOLAM 0.5 MG/1
0.5 TABLET ORAL 3 TIMES DAILY PRN
Qty: 30 TABLET | Refills: 0 | Status: SHIPPED | OUTPATIENT
Start: 2025-01-13 | End: 2026-01-13

## 2025-01-13 NOTE — TELEPHONE ENCOUNTER
----- Message from Kassie sent at 1/13/2025  8:47 AM CST -----  Pt needs to be re-schedule her appt for this morning. I tried re-scheduling, however the system is not allowing me to re-schedule the Pt.     CB# [phone]

## 2025-01-13 NOTE — PROGRESS NOTES
Service Date:  1/13/25    Chief Complaint:  Esophageal mass     Kwasi Brown is a 59 y.o. female with a history of GERD who reports 2-3 month history of progressive obstructive dysphagia and pain which led her to have an evaluation by Dr. Tan who discovered a large GEJ mass.  Biopsies were taken in her still pending.  CT scan was done which is concerning for advanced disease extending to the pericardium with bulky periaortic and retroperitoneal lymphadenopathy.  Patient also has a 5 millimeter lung nodule and a bony lesion on the T-spine and also complaining of recent back pain over the last few weeks.  Currently having some trouble swallowing with pain even with liquid intake.  Has some nausea as well.  Feeling anxious as well.    Review of Systems   Constitutional:  Positive for appetite change. Negative for unexpected weight change.   HENT: Negative.  Negative for mouth sores.    Eyes: Negative.  Negative for visual disturbance.   Respiratory:  Positive for cough. Negative for shortness of breath.    Cardiovascular: Negative.  Negative for chest pain.   Gastrointestinal:  Positive for diarrhea.   Endocrine: Negative.    Genitourinary: Negative.  Negative for frequency.   Musculoskeletal:  Positive for back pain.   Integumentary:  Negative for rash. Negative.   Neurological: Negative.  Negative for headaches.   Hematological: Negative.  Negative for adenopathy.   Psychiatric/Behavioral: Negative.  The patient is not nervous/anxious.         Current Outpatient Medications   Medication Instructions    ALPRAZolam (XANAX) 0.5 mg, Oral, 3 times daily PRN    benazepril-hydrochlorthiazide (LOTENSIN HCT) 20-12.5 mg per tablet 1 tablet, Oral, Daily    cetirizine (ZYRTEC) 10 mg, Daily    fluticasone propionate (FLONASE) 50 mcg/actuation nasal spray 1 spray, Daily    lysine 1,000 mg Tab Take by mouth.    multivitamin capsule 1 capsule, Daily    omega 3-dha-epa-fish oil 60- mg CpDR Take by mouth.    ondansetron  (ZOFRAN) 8 mg, Oral, Every 12 hours PRN    UNABLE TO FIND 900 mg, Oral, 4 times daily, medication name: cholestoff plus        Past Medical History:   Diagnosis Date    Hypertension     Obesity         Past Surgical History:   Procedure Laterality Date    KNEE RECONSTRUCTION, MEDIAL PATELLAR FEMORAL LIGAMENT      left        Family History   Problem Relation Name Age of Onset    Tuberculosis Mother      COPD Mother      Heart disease Mother  70    Hypertension Mother      Hypertension Father      Heart disease Father  78    Diabetes Father      Diabetes Brother  1        type 1    Heart disease Brother      Hypertension Brother      Coronary artery disease Unknown      Hypertension Unknown      Diabetes Unknown         Social History     Tobacco Use    Smoking status: Former     Current packs/day: 0.00     Average packs/day: 0.3 packs/day for 12.0 years (3.0 ttl pk-yrs)     Types: Cigarettes     Start date: 1971     Quit date: 1983     Years since quittin.3    Smokeless tobacco: Never   Substance Use Topics    Alcohol use: Yes     Comment: occasional    Drug use: No         Vitals:    25 1458   BP: (!) 146/85   Pulse: 83   Resp: 16   Temp: 97.7 °F (36.5 °C)        Physical Exam:  BP (!) 146/85   Pulse 83   Temp 97.7 °F (36.5 °C)   Resp 16   Wt 98.2 kg (216 lb 6.4 oz)   BMI 33.89 kg/m²     Physical Exam  Constitutional:       Appearance: Normal appearance.   HENT:      Head: Normocephalic and atraumatic.      Nose: Nose normal.      Mouth/Throat:      Mouth: Mucous membranes are moist.      Pharynx: Oropharynx is clear.   Eyes:      Conjunctiva/sclera: Conjunctivae normal.   Cardiovascular:      Rate and Rhythm: Normal rate and regular rhythm.      Heart sounds: Normal heart sounds.   Pulmonary:      Effort: Pulmonary effort is normal.      Breath sounds: Normal breath sounds.   Abdominal:      General: Abdomen is flat. Bowel sounds are normal.      Palpations: Abdomen is soft.    Musculoskeletal:         General: Normal range of motion.      Cervical back: Normal range of motion and neck supple.   Skin:     General: Skin is warm and dry.   Neurological:      General: No focal deficit present.      Mental Status: She is alert and oriented to person, place, and time. Mental status is at baseline.   Psychiatric:         Mood and Affect: Mood normal.          Labs:  Lab Results   Component Value Date    WBC 6.18 09/13/2024    RBC 4.94 09/13/2024    HGB 13.8 09/13/2024    HCT 43.2 09/13/2024    MCV 87 09/13/2024    MCH 27.9 09/13/2024    MCHC 31.9 (L) 09/13/2024    RDW 14.2 09/13/2024     09/13/2024    MPV 10.1 09/13/2024    GRAN 3.4 09/13/2024    GRAN 54.9 09/13/2024    LYMPH 2.0 09/13/2024    LYMPH 32.7 09/13/2024    MONO 0.6 09/13/2024    MONO 9.9 09/13/2024    EOS 0.1 09/13/2024    BASO 0.03 09/13/2024    EOSINOPHIL 1.8 09/13/2024    BASOPHIL 0.5 09/13/2024     Sodium   Date Value Ref Range Status   09/13/2024 141 136 - 145 mmol/L Final     Potassium   Date Value Ref Range Status   09/13/2024 3.8 3.5 - 5.1 mmol/L Final     Chloride   Date Value Ref Range Status   09/13/2024 103 95 - 110 mmol/L Final     CO2   Date Value Ref Range Status   09/13/2024 29 23 - 29 mmol/L Final     Glucose   Date Value Ref Range Status   09/13/2024 112 (H) 70 - 110 mg/dL Final     BUN   Date Value Ref Range Status   09/13/2024 13 8 - 23 mg/dL Final     Creatinine   Date Value Ref Range Status   09/13/2024 0.6 0.5 - 1.4 mg/dL Final     Calcium   Date Value Ref Range Status   09/13/2024 9.0 8.7 - 10.5 mg/dL Final     Total Protein   Date Value Ref Range Status   09/13/2024 7.5 6.0 - 8.4 g/dL Final     Albumin   Date Value Ref Range Status   09/13/2024 4.1 3.5 - 5.2 g/dL Final     Total Bilirubin   Date Value Ref Range Status   09/13/2024 0.5 0.1 - 1.0 mg/dL Final     Comment:     For infants and newborns, interpretation of results should be based  on gestational age, weight and in agreement with  clinical  observations.    Premature Infant recommended reference ranges:  Up to 24 hours.............<8.0 mg/dL  Up to 48 hours............<12.0 mg/dL  3-5 days..................<15.0 mg/dL  6-29 days.................<15.0 mg/dL       Alkaline Phosphatase   Date Value Ref Range Status   09/13/2024 68 55 - 135 U/L Final     AST   Date Value Ref Range Status   09/13/2024 19 10 - 40 U/L Final     ALT   Date Value Ref Range Status   09/13/2024 24 10 - 44 U/L Final     Anion Gap   Date Value Ref Range Status   09/13/2024 9 8 - 16 mmol/L Final     eGFR if    Date Value Ref Range Status   02/23/2022 >60.0 >60 mL/min/1.73 m^2 Final     eGFR if non    Date Value Ref Range Status   02/23/2022 >60.0 >60 mL/min/1.73 m^2 Final     Comment:     Calculation used to obtain the estimated glomerular filtration  rate (eGFR) is the CKD-EPI equation.          A/P:    Esophageal mass   -final pathology pending but concerning for advanced esophageal adenocarcinoma.  Will follow up on pathology.  Also will follow up on PET scan.  -I explained the range of treatment options for her depending on these results.    -we will likely treat her with preoperative chemotherapy followed by chemoradiation in the event that operation is an option.  If not all treatment will be palliative.  I explained this to her.  She is understanding.    Nausea   -will give a prescription for Zofran     Anxiety   -will give a prescription for Xanax    I will order final chemo regimen once I see the path results and PET scan results      Aurash Khoobehi, MD  Hematology and Oncology

## 2025-01-13 NOTE — PROGRESS NOTES
Kwasi Brown  [id number]  1965    [facility]    Dx: Locally advanced (at least) esophageal cancer (path & PET pending)    HISTORY OF PRESENT ILLNESS:   Kwasi Brown is a 59 y.o. F w/ h/o GERD who reports 2-3 month h/o progressive obstructive dysphagia and pain which led her in Dec 2024 to eval by Dr. Tan who discovered on scope a large bulky GEJ mass.     Biopsies were taken, results pending, and CT imaging revealed bulky GEJ mass extending to pericardium with bulky PA/RP LAD.      One 5mm RLL nodule also seen suspicious for DM, as well as a round bony lesion in T-spine vertebral body correlating to recently reported back pain.    Currently pt is able to tolerate only liquid PO intake, but this is painful. She denies regurgitation or aspiration, and endorses a 30# wt loss over last few months.    REVIEW OF SYSTEMS:  A complete ROS was performed and the patient denies any acute changes/concerns other than per HPI.    Past Medical History:   Diagnosis Date    Hypertension     Obesity      Past Surgical History:   Procedure Laterality Date    KNEE RECONSTRUCTION, MEDIAL PATELLAR FEMORAL LIGAMENT      left     Social History     Socioeconomic History    Marital status:    Occupational History     Employer: [employer]   Tobacco Use    Smoking status: Former     Current packs/day: 0.00     Average packs/day: 0.3 packs/day for 12.0 years (3.0 ttl pk-yrs)     Types: Cigarettes     Start date: 1971     Quit date: 1983     Years since quittin.3    Smokeless tobacco: Never   Substance and Sexual Activity    Alcohol use: Yes     Comment: occasional    Drug use: No     Social Drivers of Health     Financial Resource Strain: Low Risk  (2025)    Overall Financial Resource Strain (CARDIA)     Difficulty of Paying Living Expenses: Not hard at all   Food Insecurity: No Food Insecurity (2025)    Hunger Vital Sign     Worried About Running Out  of Food in the Last Year: Never true     Ran Out of Food in the Last Year: Never true   Physical Activity: Inactive (1/13/2025)    Exercise Vital Sign     Days of Exercise per Week: 0 days     Minutes of Exercise per Session: 10 min   Stress: No Stress Concern Present (1/13/2025)    [employer] - Occupational Stress Questionnaire     Feeling of Stress : Only a little   Housing Stability: Unknown (1/13/2025)    Housing Stability Vital Sign     Unable to Pay for Housing in the Last Year: No     Family History   Problem Relation Name Age of Onset    Tuberculosis Mother      COPD Mother      Heart disease Mother  70    Hypertension Mother      Hypertension Father      Heart disease Father  78    Diabetes Father      Diabetes Brother  1        type 1    Heart disease Brother      Hypertension Brother      Coronary artery disease Unknown      Hypertension Unknown      Diabetes Unknown         PRIOR HISTORY OF CHEMOTHERAPY OR RADIOTHERAPY: Please see HPI for patients prior oncologic history.    Medication List with Changes/Refills   Current Medications    BENAZEPRIL-HYDROCHLORTHIAZIDE (LOTENSIN HCT) 20-12.5 MG PER TABLET    Take 1 tablet by mouth once daily.    CETIRIZINE (ZYRTEC) 10 MG TABLET    Take 10 mg by mouth once daily.    FLUTICASONE PROPIONATE (FLONASE) 50 MCG/ACTUATION NASAL SPRAY    1 spray by Each Nostril route once daily.    LYSINE 1,000 MG TAB    Take by mouth.    MULTIVITAMIN CAPSULE    Take 1 capsule by mouth once daily.    OMEGA 3-DHA-EPA-FISH OIL 60- MG CPDR    Take by mouth.    UNABLE TO FIND    Take 900 mg by mouth 4 (four) times daily. medication name: cholestoff plus     Review of patient's allergies indicates:  No Known Allergies    QUALITY OF LIFE: 80%- Normal Activity with Effort: Some Symptoms of Disease    There were no vitals filed for this visit.  There is no height or weight on file to calculate BMI.    PHYSICAL EXAM:  GENERAL: alert; in no apparent  distress.   HEAD: normocephalic, atraumatic.  EYES: pupils are equal, round, reactive to light and accommodation. Sclera anicteric. Conjunctiva not injected.   NOSE/THROAT: no nasal erythema or rhinorrhea. Oropharynx pink, without erythema, ulcerations or thrush.   NECK: no cervical motion rigidity; supple with no masses.  CHEST: clear to auscultation bilaterally; no wheezes, crackles or rubs. Patient is speaking comfortably on room air with normal work of breathing without using accessory muscles of respiration.  CARDIOVASCULAR: regular rate and rhythm; no murmurs, rubs or gallops.  ABDOMEN: soft, nontender, nondistended. Bowel sounds present.   MUSCULOSKELETAL: no tenderness to palpation along the spine or scapulae. Normal range of motion.  NEUROLOGIC: cranial nerves II-XII intact bilaterally. Strength 5/5 in bilateral upper and lower extremities. No sensory deficits appreciated. Reflexes globally intact. No cerebellar signs. Normal gait.  LYMPHATIC: no cervical, supraclavicular or axillary adenopathy appreciated bilaterally.   EXTREMITIES: no clubbing, cyanosis, edema.      REVIEW OF IMAGING/PATHOLOGY/LABS: Please see HPI. All images reviewed personally by dictating physician.       ASSESSMENT: Kwasi Brown is a 59 y.o. female with III-IV locally advanced (at least) esophageal cancer (path & PET pending)    PLAN:  Path and PET are needed to finalize her dx/stage, as well as treatment recs.  However at this time it was agreed that she needs a PEG and port, and Dr. Anderson tentatively plans for ex-lap to eval for pericardial invasion and/or peritoneal carcinomatosis.    If found w/ to DM/systemic disease, will offer palliative RT initially to mitigate GEJ obstruction, but otherwise agree that upfront systemic therapy (ie FLOT x4) is warranted here.      Pending [facility] eval upfront and after chemo re: surgical candidacy, definitive CRT may also be considered after induction chemo if pt is currently cM0 (PET  pending) and remains so after such chemo.    I spent over one hour in consultation with the patient discussing the rationales, risks, benefits, and alternatives to this plan, as well as the potential toxicities of thoracoabdominal CRT, including but not limited to fatigue, skin irritation/erythema/desquamation, abd pain/cramping, pneumonitis and/or pulmonary fibrosis, increased risk of cardiac disease, acute/chronic hepatitis and/or pancreatitis, n/v/d, acute and/or chronic kidney damage possibly contributing to a need for dialysis, proctitis, gastritis, bowel or stomach perforation/fistulization requiring surgical repair, spinal cord damage/myelitis, and secondary malignancy.    The patient verbalized understanding of the above plan, risks, benefits, and details, and informed consent was obtained.  We will proceed with RTP-CT imaging with plans to initiate RT w/in the next 1-2 weeks.  Contact info was exchanged, and the patient was encouraged to contact our clinic with any questions, needs, or concerns.    DISPOSITION: RTC AFTER FURTHER WORKUP    I have personally seen and evaluated this patient. Greater than 50% of this time was spent discussing coordination of care and/or counseling.    PHYSICIAN: Bipin Banks III, MD    Thank you for the opportunity to meet and consult with Kwasi Brown.   Please feel free to contact me to discuss the above recommendation further.   Next Month's Dosage: Continue Current Dosage

## 2025-01-13 NOTE — TELEPHONE ENCOUNTER
----- Message from Dawn sent at 1/13/2025 11:08 AM CST -----  They put her on with Dr. Koohbehi  ----- Message -----  From: Yodit Plaza, MARCELO  Sent: 1/13/2025  10:49 AM CST  To: Dawn Matamoros    Hi Mrs. Jordan, just see what you can do. Thanks!!  ----- Message -----  From: Kassie Malin  Sent: 1/13/2025   8:49 AM CST  To: [facility] Staff    Pt needs to be re-schedule her appt for this morning. I tried re-scheduling, however the system is not allowing me to re-schedule the Pt.     # [phone]

## 2025-01-15 DIAGNOSIS — C15.5 MALIGNANT NEOPLASM OF ABDOMINAL ESOPHAGUS: Primary | ICD-10-CM

## 2025-01-15 NOTE — PLAN OF CARE
START ON PATHWAY REGIMEN - Gastroesophageal    [id number]        Docetaxel (Taxotere)       Oxaliplatin       Leucovorin       Fluorouracil     **Always confirm dose/schedule in your pharmacy ordering system**    Patient Characteristics:  Esophageal & GE Junction, Adenocarcinoma, Preoperative or Nonsurgical Candidate,   M0 (Clinical Staging), cT2 or Higher or cN+, Surgical Candidate (Up to cT4a),   GE Junction, JENIFFER/pMMR or MSI Unknown  Therapeutic Status: Preoperative or Nonsurgical Candidate, M0 (Clinical Staging)  Histology: Adenocarcinoma  Disease Classification: GE Junction  AJCC Grade: G3  AJCC 8 Stage Grouping: HUYEN  AJCC T Category: cT4a  AJCC N Category: cN2  AJCC M Category: cM0  Microsatellite/Mismatch Repair Status: Unknown  Intent of Therapy:  Non-Curative / Palliative Intent, Discussed with Patient

## 2025-01-16 ENCOUNTER — DOCUMENTATION ONLY (OUTPATIENT)
Facility: CLINIC | Age: 60
End: 2025-01-16
Payer: COMMERCIAL

## 2025-01-16 ENCOUNTER — TELEPHONE (OUTPATIENT)
Facility: CLINIC | Age: 60
End: 2025-01-16
Payer: COMMERCIAL

## 2025-01-16 ENCOUNTER — HOSPITAL ENCOUNTER (OUTPATIENT)
Dept: RADIOLOGY | Facility: HOSPITAL | Age: 60
Discharge: HOME | End: 2025-01-16
Attending: INTERNAL MEDICINE
Payer: COMMERCIAL

## 2025-01-16 DIAGNOSIS — K22.89 ESOPHAGEAL MASS: ICD-10-CM

## 2025-01-16 LAB — GLUCOSE SERPL-MCNC: 81 MG/DL (ref 70–110)

## 2025-01-16 PROCEDURE — 78815 PET IMAGE W/CT SKULL-THIGH: CPT | Mod: 26,PI,, | Performed by: RADIOLOGY

## 2025-01-16 PROCEDURE — A9552 F18 FDG: HCPCS | Mod: PN | Performed by: INTERNAL MEDICINE

## 2025-01-16 PROCEDURE — 78815 PET IMAGE W/CT SKULL-THIGH: CPT | Mod: TC,PN

## 2025-01-16 RX ORDER — FLUDEOXYGLUCOSE F18 500 MCI/ML
12 INJECTION INTRAVENOUS
Status: COMPLETED | OUTPATIENT
Start: 2025-01-16 | End: 2025-01-16

## 2025-01-16 RX ADMIN — FLUDEOXYGLUCOSE F-18 14.6 MILLICURIE: 500 INJECTION INTRAVENOUS at 02:01

## 2025-01-16 NOTE — NURSING
Talked with [facility] at [phone]. She will request slides today from [facility] pathology. She will fax results to Dr. Khoobehi once slides are reviewed from patients pathology results on 1/8/2025.

## 2025-01-16 NOTE — PROGRESS NOTES
PET Imaging Questionnaire    Are you a Diabetic? Recent Blood Sugar level? No    Are you anemic? Bone Marrow Stimulation Meds? No    Have you had a CT Scan, if so when & where was your last one? Yes -     Have you had a PET Scan, if so when & where was your last one? No    Chemotherapy or currently on Chemotherapy? No    Radiation therapy? No    Surgical History:   Past Surgical History:   Procedure Laterality Date    KNEE RECONSTRUCTION, MEDIAL PATELLAR FEMORAL LIGAMENT      left        Have you been fasting for at least 6 hours? Yes    Is there any chance you may be pregnant or breastfeeding? No    Assay: 16 MCi@:1417   Injection Site:rt ac     Residual: 1.43 mCi@: 1419   Technologist: Hailey Sousa Injected:14.6mCi

## 2025-01-17 ENCOUNTER — TELEPHONE (OUTPATIENT)
Facility: CLINIC | Age: 60
End: 2025-01-17
Payer: COMMERCIAL

## 2025-01-17 ENCOUNTER — TELEPHONE (OUTPATIENT)
Dept: SURGERY | Facility: CLINIC | Age: 60
End: 2025-01-17
Payer: COMMERCIAL

## 2025-01-17 DIAGNOSIS — K22.89 ESOPHAGEAL MASS: ICD-10-CM

## 2025-01-17 DIAGNOSIS — C15.5 MALIGNANT NEOPLASM OF ABDOMINAL ESOPHAGUS: Primary | ICD-10-CM

## 2025-01-17 NOTE — NURSING
Returned call to patient. She inquired about plan of care following her PET scan. Discussed with Dr. Khoobehi and sent message to other providers. Patient to see Dr. Khoobehi on 1/23/25 to review PET scan and discuss. Will reschedule chemotherapy school at later date. Informed patient that referral will also be placed to general surgery to accommodate a port and PEG placement here in [place]. Informed CARIS results are still pending and take around 4 weeks to results. Patient verbalized understanding. Encouraged her to call with any questions or concerns.

## 2025-01-17 NOTE — TELEPHONE ENCOUNTER
Spoke to pt regarding g tube/port placement, pt prefers to have procedures done in [place]. Confirmed with AMBROCIO Cazares RN.

## 2025-01-22 ENCOUNTER — PATIENT MESSAGE (OUTPATIENT)
Dept: CARDIOLOGY | Facility: CLINIC | Age: 60
End: 2025-01-22
Payer: COMMERCIAL

## 2025-01-23 ENCOUNTER — TELEPHONE (OUTPATIENT)
Dept: HEMATOLOGY/ONCOLOGY | Facility: CLINIC | Age: 60
End: 2025-01-23
Payer: COMMERCIAL

## 2025-01-23 ENCOUNTER — TELEPHONE (OUTPATIENT)
Facility: CLINIC | Age: 60
End: 2025-01-23
Payer: COMMERCIAL

## 2025-01-23 NOTE — TELEPHONE ENCOUNTER
Pathology report faxed to [facility].    ----- Message from La sent at 1/23/2025  3:38 PM CST -----  Regarding: [employer]  Pathology report needed to request a specimen    Reporting 01/03/25    Fax [phone]     number for info  [phone]    Case [id number]

## 2025-01-23 NOTE — NURSING
Returned call to patient. Informed her that message was sent to radiation staff to set up for radiation follow up and planning. Encouraged patient to call back should she not hear from anyone about scheduling within the next couple of days.

## 2025-01-24 ENCOUNTER — TREATMENT (OUTPATIENT)
Dept: RADIATION ONCOLOGY | Facility: CLINIC | Age: 60
End: 2025-01-24
Payer: COMMERCIAL

## 2025-01-24 ENCOUNTER — OFFICE VISIT (OUTPATIENT)
Dept: HEMATOLOGY/ONCOLOGY | Facility: CLINIC | Age: 60
End: 2025-01-24
Payer: COMMERCIAL

## 2025-01-24 ENCOUNTER — OFFICE VISIT (OUTPATIENT)
Dept: SURGERY | Facility: CLINIC | Age: 60
End: 2025-01-24
Payer: COMMERCIAL

## 2025-01-24 VITALS
RESPIRATION RATE: 18 BRPM | TEMPERATURE: 98 F | BODY MASS INDEX: 33.15 KG/M2 | HEIGHT: 67 IN | HEART RATE: 85 BPM | OXYGEN SATURATION: 100 % | DIASTOLIC BLOOD PRESSURE: 78 MMHG | SYSTOLIC BLOOD PRESSURE: 144 MMHG | WEIGHT: 211.19 LBS

## 2025-01-24 VITALS
SYSTOLIC BLOOD PRESSURE: 139 MMHG | WEIGHT: 207 LBS | DIASTOLIC BLOOD PRESSURE: 79 MMHG | HEIGHT: 67 IN | HEART RATE: 85 BPM | TEMPERATURE: 98 F | BODY MASS INDEX: 32.49 KG/M2

## 2025-01-24 DIAGNOSIS — K22.89 ESOPHAGEAL MASS: ICD-10-CM

## 2025-01-24 DIAGNOSIS — C15.5 MALIGNANT NEOPLASM OF ABDOMINAL ESOPHAGUS: Primary | ICD-10-CM

## 2025-01-24 PROCEDURE — 99204 OFFICE O/P NEW MOD 45 MIN: CPT | Mod: S$GLB,,, | Performed by: SURGERY

## 2025-01-24 PROCEDURE — 77263 THER RADIOLOGY TX PLNG CPLX: CPT | Mod: S$GLB,,, | Performed by: RADIOLOGY

## 2025-01-24 PROCEDURE — 3078F DIAST BP <80 MM HG: CPT | Mod: CPTII,S$GLB,, | Performed by: INTERNAL MEDICINE

## 2025-01-24 PROCEDURE — G2211 COMPLEX E/M VISIT ADD ON: HCPCS | Mod: S$GLB,,, | Performed by: INTERNAL MEDICINE

## 2025-01-24 PROCEDURE — 99999 PR PBB SHADOW E&M-EST. PATIENT-LVL V: CPT | Mod: PBBFAC,,, | Performed by: SURGERY

## 2025-01-24 PROCEDURE — 3077F SYST BP >= 140 MM HG: CPT | Mod: CPTII,S$GLB,, | Performed by: INTERNAL MEDICINE

## 2025-01-24 PROCEDURE — 77399 UNLISTED PX MED RADJ PHYSICS: CPT | Mod: S$GLB,,, | Performed by: RADIOLOGY

## 2025-01-24 PROCEDURE — 1159F MED LIST DOCD IN RCRD: CPT | Mod: CPTII,S$GLB,, | Performed by: INTERNAL MEDICINE

## 2025-01-24 PROCEDURE — 3075F SYST BP GE 130 - 139MM HG: CPT | Mod: CPTII,S$GLB,, | Performed by: SURGERY

## 2025-01-24 PROCEDURE — 4010F ACE/ARB THERAPY RXD/TAKEN: CPT | Mod: CPTII,S$GLB,, | Performed by: SURGERY

## 2025-01-24 PROCEDURE — 99999 PR PBB SHADOW E&M-EST. PATIENT-LVL IV: CPT | Mod: PBBFAC,,, | Performed by: INTERNAL MEDICINE

## 2025-01-24 PROCEDURE — 3008F BODY MASS INDEX DOCD: CPT | Mod: CPTII,S$GLB,, | Performed by: INTERNAL MEDICINE

## 2025-01-24 PROCEDURE — 3008F BODY MASS INDEX DOCD: CPT | Mod: CPTII,S$GLB,, | Performed by: SURGERY

## 2025-01-24 PROCEDURE — 77334 RADIATION TREATMENT AID(S): CPT | Mod: S$GLB,,, | Performed by: RADIOLOGY

## 2025-01-24 PROCEDURE — 3078F DIAST BP <80 MM HG: CPT | Mod: CPTII,S$GLB,, | Performed by: SURGERY

## 2025-01-24 PROCEDURE — 99214 OFFICE O/P EST MOD 30 MIN: CPT | Mod: S$GLB,,, | Performed by: INTERNAL MEDICINE

## 2025-01-24 RX ORDER — NAPROXEN SODIUM 220 MG
220 TABLET ORAL
COMMUNITY

## 2025-01-24 RX ORDER — PROMETHAZINE HYDROCHLORIDE 25 MG/1
25 TABLET ORAL EVERY 6 HOURS PRN
Qty: 30 TABLET | Refills: 1 | Status: SHIPPED | OUTPATIENT
Start: 2025-01-24

## 2025-01-24 RX ORDER — CEFAZOLIN SODIUM 2 G/50ML
2 SOLUTION INTRAVENOUS
Status: CANCELLED | OUTPATIENT
Start: 2025-01-24

## 2025-01-24 NOTE — PROGRESS NOTES
Service Date:  1/24/25    Chief Complaint:  Esophageal carcinoma     Kwasi Brown is a 59 y.o. female here with its esophageal carcinoma.  Has met with Radiation Oncology and had mapping done.  Here to discuss PET scan results and answer further questions.  She has not yet had her chemo school.  She will get her port placed soon.    Review of Systems   Constitutional:  Positive for appetite change. Negative for unexpected weight change.   HENT: Negative.  Negative for mouth sores.    Eyes: Negative.  Negative for visual disturbance.   Respiratory:  Positive for cough. Negative for shortness of breath.    Cardiovascular: Negative.  Negative for chest pain.   Gastrointestinal:  Positive for diarrhea. Negative for abdominal pain.   Endocrine: Negative.    Genitourinary: Negative.  Negative for frequency.   Musculoskeletal:  Positive for back pain.   Integumentary:  Negative for rash. Negative.   Neurological: Negative.  Negative for headaches.   Hematological: Negative.  Negative for adenopathy.   Psychiatric/Behavioral: Negative.  The patient is not nervous/anxious.         Current Outpatient Medications   Medication Instructions    ALPRAZolam (XANAX) 0.5 mg, Oral, 3 times daily PRN    benazepril-hydrochlorthiazide (LOTENSIN HCT) 20-12.5 mg per tablet 1 tablet, Oral, Daily    cetirizine (ZYRTEC) 10 mg, Daily    fluticasone propionate (FLONASE) 50 mcg/actuation nasal spray 1 spray, Daily    lysine 1,000 mg Tab Take by mouth.    multivitamin capsule 1 capsule, Daily    naproxen sodium (ANAPROX) 220 mg, Every 12 hours (non-standard times)    omega 3-dha-epa-fish oil 60- mg CpDR Take by mouth.    ondansetron (ZOFRAN) 8 mg, Oral, Every 12 hours PRN    promethazine (PHENERGAN) 25 mg, Oral, Every 6 hours PRN    UNABLE TO FIND 900 mg, Oral, 4 times daily, medication name: cholestoff plus        Past Medical History:   Diagnosis Date    Esophageal cancer 01/04/2025    Hypertension     Obesity         Past Surgical  "History:   Procedure Laterality Date    ADENOIDECTOMY      COLONOSCOPY W/ POLYPECTOMY      Age 24 - polyps negative    HYSTERECTOMY      Partial Hysterectomy    KNEE RECONSTRUCTION, MEDIAL PATELLAR FEMORAL LIGAMENT      left    TONSILLECTOMY          Family History   Problem Relation Name Age of Onset    Tuberculosis Mother      COPD Mother      Heart disease Mother  70    Hypertension Mother      Hypertension Father      Heart disease Father  78    Diabetes Father      Diabetes Brother  1        type 1    Heart disease Brother      Hypertension Brother      Coronary artery disease Unknown      Hypertension Unknown      Diabetes Unknown         Social History     Tobacco Use    Smoking status: Former     Current packs/day: 0.00     Average packs/day: 0.3 packs/day for 12.0 years (3.0 ttl pk-yrs)     Types: Cigarettes     Start date: 1971     Quit date: 1983     Years since quittin.4    Smokeless tobacco: Never   Substance Use Topics    Alcohol use: Yes     Comment: occasional    Drug use: No         Vitals:    25 1151   BP: (!) 144/78   Pulse: 85   Resp: 18   Temp: 98.1 °F (36.7 °C)        Physical Exam:  BP (!) 144/78 (BP Location: Right arm, Patient Position: Sitting)   Pulse 85   Temp 98.1 °F (36.7 °C) (Temporal)   Resp 18   Ht 5' 7" (1.702 m)   Wt 95.8 kg (211 lb 3.2 oz)   SpO2 100%   BMI 33.08 kg/m²     Physical Exam  Constitutional:       Appearance: Normal appearance.   HENT:      Head: Normocephalic and atraumatic.      Nose: Nose normal.      Mouth/Throat:      Mouth: Mucous membranes are moist.      Pharynx: Oropharynx is clear.   Eyes:      Conjunctiva/sclera: Conjunctivae normal.   Cardiovascular:      Rate and Rhythm: Normal rate and regular rhythm.      Heart sounds: Normal heart sounds.   Pulmonary:      Effort: Pulmonary effort is normal.      Breath sounds: Normal breath sounds.   Abdominal:      General: Abdomen is flat. Bowel sounds are normal.      Palpations: Abdomen is " soft.   Musculoskeletal:         General: Normal range of motion.      Cervical back: Normal range of motion and neck supple.   Skin:     General: Skin is warm and dry.   Neurological:      General: No focal deficit present.      Mental Status: She is alert and oriented to person, place, and time. Mental status is at baseline.   Psychiatric:         Mood and Affect: Mood normal.          Labs:  Lab Results   Component Value Date    WBC 6.18 09/13/2024    RBC 4.94 09/13/2024    HGB 13.8 09/13/2024    HCT 43.2 09/13/2024    MCV 87 09/13/2024    MCH 27.9 09/13/2024    MCHC 31.9 (L) 09/13/2024    RDW 14.2 09/13/2024     09/13/2024    MPV 10.1 09/13/2024    GRAN 3.4 09/13/2024    GRAN 54.9 09/13/2024    LYMPH 2.0 09/13/2024    LYMPH 32.7 09/13/2024    MONO 0.6 09/13/2024    MONO 9.9 09/13/2024    EOS 0.1 09/13/2024    BASO 0.03 09/13/2024    EOSINOPHIL 1.8 09/13/2024    BASOPHIL 0.5 09/13/2024     Sodium   Date Value Ref Range Status   09/13/2024 141 136 - 145 mmol/L Final     Potassium   Date Value Ref Range Status   09/13/2024 3.8 3.5 - 5.1 mmol/L Final     Chloride   Date Value Ref Range Status   09/13/2024 103 95 - 110 mmol/L Final     CO2   Date Value Ref Range Status   09/13/2024 29 23 - 29 mmol/L Final     Glucose   Date Value Ref Range Status   09/13/2024 112 (H) 70 - 110 mg/dL Final     BUN   Date Value Ref Range Status   09/13/2024 13 8 - 23 mg/dL Final     Creatinine   Date Value Ref Range Status   09/13/2024 0.6 0.5 - 1.4 mg/dL Final     Calcium   Date Value Ref Range Status   09/13/2024 9.0 8.7 - 10.5 mg/dL Final     Total Protein   Date Value Ref Range Status   09/13/2024 7.5 6.0 - 8.4 g/dL Final     Albumin   Date Value Ref Range Status   09/13/2024 4.1 3.5 - 5.2 g/dL Final     Total Bilirubin   Date Value Ref Range Status   09/13/2024 0.5 0.1 - 1.0 mg/dL Final     Comment:     For infants and newborns, interpretation of results should be based  on gestational age, weight and in agreement with  clinical  observations.    Premature Infant recommended reference ranges:  Up to 24 hours.............<8.0 mg/dL  Up to 48 hours............<12.0 mg/dL  3-5 days..................<15.0 mg/dL  6-29 days.................<15.0 mg/dL       Alkaline Phosphatase   Date Value Ref Range Status   09/13/2024 68 55 - 135 U/L Final     AST   Date Value Ref Range Status   09/13/2024 19 10 - 40 U/L Final     ALT   Date Value Ref Range Status   09/13/2024 24 10 - 44 U/L Final     Anion Gap   Date Value Ref Range Status   09/13/2024 9 8 - 16 mmol/L Final     eGFR if    Date Value Ref Range Status   02/23/2022 >60.0 >60 mL/min/1.73 m^2 Final     eGFR if non    Date Value Ref Range Status   02/23/2022 >60.0 >60 mL/min/1.73 m^2 Final     Comment:     Calculation used to obtain the estimated glomerular filtration  rate (eGFR) is the CKD-EPI equation.          A/P:    Poorly differentiated carcinoma of the esophagus  -PET scan unfortunately reveals a supraclavicular lymph node that is positive with a SUV of 9  -recommend palliative radiation to start with transition to chemotherapy after   -today we went over her PET scan results and plan with chemotherapy and palliative nature of her treatment.  I will also check with Dr. Anderson to ensure that patient does not need any type of resection or if she is even a candidate  -return to clinic when chemo is ready      Aurash Khoobehi, MD  Hematology and Oncology    Visit today included increased complexity associated with the care of the episodic problem esophageal cancer addressed and managing the longitudinal care of the patient due to the serious and/or complex managed problem(s).

## 2025-01-25 PROCEDURE — 77300 RADIATION THERAPY DOSE PLAN: CPT | Mod: S$GLB,,, | Performed by: RADIOLOGY

## 2025-01-25 PROCEDURE — 77338 DESIGN MLC DEVICE FOR IMRT: CPT | Mod: S$GLB,,, | Performed by: RADIOLOGY

## 2025-01-25 PROCEDURE — 77301 RADIOTHERAPY DOSE PLAN IMRT: CPT | Mod: S$GLB,,, | Performed by: RADIOLOGY

## 2025-01-28 ENCOUNTER — TELEPHONE (OUTPATIENT)
Facility: CLINIC | Age: 60
End: 2025-01-28
Payer: COMMERCIAL

## 2025-01-28 ENCOUNTER — HOSPITAL ENCOUNTER (OUTPATIENT)
Dept: PREADMISSION TESTING | Facility: HOSPITAL | Age: 60
Discharge: HOME | End: 2025-01-28
Attending: SURGERY
Payer: COMMERCIAL

## 2025-01-28 ENCOUNTER — TELEPHONE (OUTPATIENT)
Dept: PSYCHIATRY | Facility: CLINIC | Age: 60
End: 2025-01-28
Payer: COMMERCIAL

## 2025-01-28 VITALS
HEIGHT: 67 IN | TEMPERATURE: 98 F | RESPIRATION RATE: 18 BRPM | BODY MASS INDEX: 32.87 KG/M2 | OXYGEN SATURATION: 100 % | SYSTOLIC BLOOD PRESSURE: 117 MMHG | HEART RATE: 89 BPM | WEIGHT: 209.44 LBS | DIASTOLIC BLOOD PRESSURE: 71 MMHG

## 2025-01-28 DIAGNOSIS — C15.5 MALIGNANT NEOPLASM OF ABDOMINAL ESOPHAGUS: ICD-10-CM

## 2025-01-28 DIAGNOSIS — K22.89 ESOPHAGEAL MASS: Primary | ICD-10-CM

## 2025-01-28 LAB
ALBUMIN SERPL BCP-MCNC: 3.5 G/DL (ref 3.5–5.2)
ALP SERPL-CCNC: 118 U/L (ref 55–135)
ALT SERPL W/O P-5'-P-CCNC: 44 U/L (ref 10–44)
ANION GAP SERPL CALC-SCNC: 7 MMOL/L (ref 8–16)
AST SERPL-CCNC: 34 U/L (ref 10–40)
BASOPHILS # BLD AUTO: 0.04 K/UL (ref 0–0.2)
BASOPHILS NFR BLD: 0.4 % (ref 0–1.9)
BILIRUB SERPL-MCNC: 0.4 MG/DL (ref 0.1–1)
BUN SERPL-MCNC: 22 MG/DL (ref 8–23)
CALCIUM SERPL-MCNC: 9.4 MG/DL (ref 8.7–10.5)
CHLORIDE SERPL-SCNC: 98 MMOL/L (ref 95–110)
CO2 SERPL-SCNC: 31 MMOL/L (ref 23–29)
CREAT SERPL-MCNC: 0.5 MG/DL (ref 0.5–1.4)
DIFFERENTIAL METHOD BLD: ABNORMAL
EOSINOPHIL # BLD AUTO: 0.1 K/UL (ref 0–0.5)
EOSINOPHIL NFR BLD: 1.1 % (ref 0–8)
ERYTHROCYTE [DISTWIDTH] IN BLOOD BY AUTOMATED COUNT: 14.6 % (ref 11.5–14.5)
EST. GFR  (NO RACE VARIABLE): >60 ML/MIN/1.73 M^2
GLUCOSE SERPL-MCNC: 110 MG/DL (ref 70–110)
HCT VFR BLD AUTO: 32.5 % (ref 37–48.5)
HGB BLD-MCNC: 10.2 G/DL (ref 12–16)
IMM GRANULOCYTES # BLD AUTO: 0.03 K/UL (ref 0–0.04)
IMM GRANULOCYTES NFR BLD AUTO: 0.3 % (ref 0–0.5)
LYMPHOCYTES # BLD AUTO: 1.6 K/UL (ref 1–4.8)
LYMPHOCYTES NFR BLD: 17.1 % (ref 18–48)
MCH RBC QN AUTO: 27.1 PG (ref 27–31)
MCHC RBC AUTO-ENTMCNC: 31.4 G/DL (ref 32–36)
MCV RBC AUTO: 86 FL (ref 82–98)
MONOCYTES # BLD AUTO: 1 K/UL (ref 0.3–1)
MONOCYTES NFR BLD: 11.3 % (ref 4–15)
NEUTROPHILS # BLD AUTO: 6.4 K/UL (ref 1.8–7.7)
NEUTROPHILS NFR BLD: 69.8 % (ref 38–73)
NRBC BLD-RTO: 0 /100 WBC
PLATELET # BLD AUTO: 257 K/UL (ref 150–450)
PMV BLD AUTO: 10 FL (ref 9.2–12.9)
POTASSIUM SERPL-SCNC: 4.6 MMOL/L (ref 3.5–5.1)
PROT SERPL-MCNC: 7.1 G/DL (ref 6–8.4)
RBC # BLD AUTO: 3.76 M/UL (ref 4–5.4)
SODIUM SERPL-SCNC: 136 MMOL/L (ref 136–145)
WBC # BLD AUTO: 9.23 K/UL (ref 3.9–12.7)

## 2025-01-28 PROCEDURE — 93005 ELECTROCARDIOGRAM TRACING: CPT | Performed by: INTERNAL MEDICINE

## 2025-01-28 PROCEDURE — 85025 COMPLETE CBC W/AUTO DIFF WBC: CPT | Performed by: SURGERY

## 2025-01-28 PROCEDURE — 80053 COMPREHEN METABOLIC PANEL: CPT | Performed by: SURGERY

## 2025-01-28 PROCEDURE — 93010 ELECTROCARDIOGRAM REPORT: CPT | Mod: ,,, | Performed by: INTERNAL MEDICINE

## 2025-01-28 NOTE — PRE-PROCEDURE INSTRUCTIONS
History and medicines reviewed with patient. EKG completed in office. Verbalized understanding of all pre op instructions as per AVS. Escorted to lab.

## 2025-01-28 NOTE — NURSING
Returned call to patient. She was inquiring about appointment on Friday. I informed her that this is for her chemotherapy education that she will complete after radiation. Patient verbalized understanding. Encouraged to call with any other questions or concerns.

## 2025-01-28 NOTE — DISCHARGE INSTRUCTIONS
To confirm, Your doctor has instructed you that surgery is scheduled for: Thursday, January 30, 2025    Pre-Op will call the afternoon prior to surgery between 4:00 and 6:00 PM with the final arrival time Wednesday, January 29, 2025    Please report to [facility] via [facility] entrance. Check in at registration desk.    DO NOT EAT AFTER MIDNIGHT BEFORE SURGERY. OK TO HAVE CLEAR LIQUIDS UP TO 2 HOURS PRIOR TO ARRIVAL.    CLEAR LIQUIDS INCLUDE: WATER, GATORADE, CLEAR JUICE (NO PULP), BLACK COFFEE AND TEA.      PLEASE NOTE:  The surgery schedule has many variables which may affect the time of your surgery case.  Family members should be available if your surgery time changes.  Plan to be here the day of your procedure between 4-6 hours.      DO NOT TAKE THESE MEDICATIONS 5-7 DAYS PRIOR to your procedure or per your surgeon's request: ASPIRIN, ALEVE, ADVIL, IBUPROFEN,  SOL SELTZER, BC , FISH OIL , VITAMIN E, HERBALS  (May take Tylenol)                                                        IMPORTANT INSTRUCTIONS      Do not smoke, vape or drink alcoholic beverages 24 hours prior to your procedure.  Shower the night before AND the morning of your procedure with a Chlorhexidine wash such as Hibiclens or Dial antibacterial soap from the neck down. Do not apply any deodorants, lotions or powders after each shower.  Do not get it on your face or in your eyes.  You may use your own shampoo and face wash. This helps your skin to be as bacteria free as possible.  DO NOT remove hair from the surgery site.  Do not shave the incision site unless you are given specific instructions to do so.    Sleep in a bed with clean sheets.  Do not sleep with a pet in the bed.   If you wear contact lenses, dentures, hearing aids or glasses, bring a container to put them in during surgery and give to a family member for safe keeping.    Please leave all jewelry, piercing's and valuables at home.       Make arrangements in advance for  transportation home by a responsible adult.      You must make arrangements for transportation, TAXI'S, UBER'S OR LYFTS ARE NOT ALLOWED.        If you have any questions about these instructions, call Pre-Op Admit  Nursing at [phone] or the Pre-Op Day Surgery Unit at [phone].

## 2025-01-29 RX ORDER — HYDROCODONE BITARTRATE AND ACETAMINOPHEN 7.5; 325 MG/15ML; MG/15ML
15 SOLUTION ORAL
Qty: 750 ML | Refills: 0 | Status: SHIPPED | OUTPATIENT
Start: 2025-01-29

## 2025-01-30 ENCOUNTER — HOSPITAL ENCOUNTER (OUTPATIENT)
Facility: HOSPITAL | Age: 60
Discharge: HOME | End: 2025-01-30
Attending: SURGERY | Admitting: SURGERY
Payer: COMMERCIAL

## 2025-01-30 ENCOUNTER — TELEPHONE (OUTPATIENT)
Dept: HEMATOLOGY/ONCOLOGY | Facility: CLINIC | Age: 60
End: 2025-01-30
Payer: COMMERCIAL

## 2025-01-30 ENCOUNTER — DOCUMENTATION ONLY (OUTPATIENT)
Dept: RADIATION ONCOLOGY | Facility: CLINIC | Age: 60
End: 2025-01-30

## 2025-01-30 ENCOUNTER — PATIENT MESSAGE (OUTPATIENT)
Dept: RADIATION ONCOLOGY | Facility: CLINIC | Age: 60
End: 2025-01-30

## 2025-01-30 ENCOUNTER — ANESTHESIA EVENT (OUTPATIENT)
Dept: SURGERY | Facility: HOSPITAL | Age: 60
End: 2025-01-30
Payer: COMMERCIAL

## 2025-01-30 ENCOUNTER — ANESTHESIA (OUTPATIENT)
Dept: SURGERY | Facility: HOSPITAL | Age: 60
End: 2025-01-30
Payer: COMMERCIAL

## 2025-01-30 VITALS
HEART RATE: 82 BPM | OXYGEN SATURATION: 100 % | HEIGHT: 67 IN | SYSTOLIC BLOOD PRESSURE: 107 MMHG | BODY MASS INDEX: 32.87 KG/M2 | TEMPERATURE: 97 F | RESPIRATION RATE: 16 BRPM | WEIGHT: 209.44 LBS | DIASTOLIC BLOOD PRESSURE: 72 MMHG

## 2025-01-30 DIAGNOSIS — C15.5 MALIGNANT NEOPLASM OF ABDOMINAL ESOPHAGUS: ICD-10-CM

## 2025-01-30 PROCEDURE — 36561 INSERT TUNNELED CV CATH: CPT | Mod: LT,,, | Performed by: SURGERY

## 2025-01-30 PROCEDURE — 37000008 HC ANESTHESIA 1ST 15 MINUTES: Performed by: SURGERY

## 2025-01-30 PROCEDURE — 71000016 HC POSTOP RECOV ADDL HR: Performed by: SURGERY

## 2025-01-30 PROCEDURE — 25000003 PHARM REV CODE 250

## 2025-01-30 PROCEDURE — 25000003 PHARM REV CODE 250: Performed by: ANESTHESIOLOGY

## 2025-01-30 PROCEDURE — 71000033 HC RECOVERY, INTIAL HOUR: Performed by: SURGERY

## 2025-01-30 PROCEDURE — 63600175 PHARM REV CODE 636 W HCPCS

## 2025-01-30 PROCEDURE — 63600175 PHARM REV CODE 636 W HCPCS: Performed by: ANESTHESIOLOGY

## 2025-01-30 PROCEDURE — 77001 FLUOROGUIDE FOR VEIN DEVICE: CPT | Mod: 26,59,, | Performed by: SURGERY

## 2025-01-30 PROCEDURE — 71000015 HC POSTOP RECOV 1ST HR: Performed by: SURGERY

## 2025-01-30 PROCEDURE — 37000009 HC ANESTHESIA EA ADD 15 MINS: Performed by: SURGERY

## 2025-01-30 PROCEDURE — 36000709 HC OR TIME LEV III EA ADD 15 MIN: Performed by: SURGERY

## 2025-01-30 PROCEDURE — 25000003 PHARM REV CODE 250: Performed by: SURGERY

## 2025-01-30 PROCEDURE — C1788 PORT, INDWELLING, IMP: HCPCS | Performed by: SURGERY

## 2025-01-30 PROCEDURE — 27201423 OPTIME MED/SURG SUP & DEVICES STERILE SUPPLY: Performed by: SURGERY

## 2025-01-30 PROCEDURE — 25500020 PHARM REV CODE 255: Performed by: SURGERY

## 2025-01-30 PROCEDURE — 63600175 PHARM REV CODE 636 W HCPCS: Performed by: SURGERY

## 2025-01-30 PROCEDURE — 36000708 HC OR TIME LEV III 1ST 15 MIN: Performed by: SURGERY

## 2025-01-30 PROCEDURE — 71000039 HC RECOVERY, EACH ADD'L HOUR: Performed by: SURGERY

## 2025-01-30 PROCEDURE — 43653 LAPAROSCOPY GASTROSTOMY: CPT | Mod: 59,,, | Performed by: SURGERY

## 2025-01-30 DEVICE — KIT POWERPORT SINGLE 8FR: Type: IMPLANTABLE DEVICE | Site: CHEST | Status: FUNCTIONAL

## 2025-01-30 RX ORDER — ACETAMINOPHEN 10 MG/ML
INJECTION, SOLUTION INTRAVENOUS
Status: DISCONTINUED | OUTPATIENT
Start: 2025-01-30 | End: 2025-01-30

## 2025-01-30 RX ORDER — GLUCAGON 1 MG
1 KIT INJECTION
Status: DISCONTINUED | OUTPATIENT
Start: 2025-01-30 | End: 2025-01-30 | Stop reason: HOSPADM

## 2025-01-30 RX ORDER — OXYCODONE HYDROCHLORIDE 5 MG/1
5 TABLET ORAL
Status: DISCONTINUED | OUTPATIENT
Start: 2025-01-30 | End: 2025-01-30 | Stop reason: HOSPADM

## 2025-01-30 RX ORDER — CEFAZOLIN 2 G/1
2 INJECTION, POWDER, FOR SOLUTION INTRAMUSCULAR; INTRAVENOUS
Status: DISCONTINUED | OUTPATIENT
Start: 2025-01-30 | End: 2025-01-30 | Stop reason: HOSPADM

## 2025-01-30 RX ORDER — SODIUM CHLORIDE, SODIUM LACTATE, POTASSIUM CHLORIDE, CALCIUM CHLORIDE 600; 310; 30; 20 MG/100ML; MG/100ML; MG/100ML; MG/100ML
INJECTION, SOLUTION INTRAVENOUS CONTINUOUS PRN
Status: DISCONTINUED | OUTPATIENT
Start: 2025-01-30 | End: 2025-01-30

## 2025-01-30 RX ORDER — BUPIVACAINE HYDROCHLORIDE AND EPINEPHRINE 2.5; 5 MG/ML; UG/ML
INJECTION, SOLUTION EPIDURAL; INFILTRATION; INTRACAUDAL; PERINEURAL
Status: DISCONTINUED | OUTPATIENT
Start: 2025-01-30 | End: 2025-01-30 | Stop reason: HOSPADM

## 2025-01-30 RX ORDER — DIATRIZOATE MEGLUMINE AND DIATRIZOATE SODIUM 660; 100 MG/ML; MG/ML
60 SOLUTION ORAL; RECTAL ONCE
Status: COMPLETED | OUTPATIENT
Start: 2025-01-30 | End: 2025-01-30

## 2025-01-30 RX ORDER — ONDANSETRON HYDROCHLORIDE 2 MG/ML
INJECTION, SOLUTION INTRAMUSCULAR; INTRAVENOUS
Status: DISCONTINUED | OUTPATIENT
Start: 2025-01-30 | End: 2025-01-30

## 2025-01-30 RX ORDER — BUPIVACAINE 13.3 MG/ML
INJECTION, SUSPENSION, LIPOSOMAL INFILTRATION
Status: DISCONTINUED | OUTPATIENT
Start: 2025-01-30 | End: 2025-01-30 | Stop reason: HOSPADM

## 2025-01-30 RX ORDER — MIDAZOLAM HYDROCHLORIDE 1 MG/ML
INJECTION INTRAMUSCULAR; INTRAVENOUS
Status: DISCONTINUED | OUTPATIENT
Start: 2025-01-30 | End: 2025-01-30

## 2025-01-30 RX ORDER — DIPHENHYDRAMINE HYDROCHLORIDE 50 MG/ML
INJECTION INTRAMUSCULAR; INTRAVENOUS
Status: DISCONTINUED | OUTPATIENT
Start: 2025-01-30 | End: 2025-01-30

## 2025-01-30 RX ORDER — PROPOFOL 10 MG/ML
VIAL (ML) INTRAVENOUS
Status: DISCONTINUED | OUTPATIENT
Start: 2025-01-30 | End: 2025-01-30

## 2025-01-30 RX ORDER — SCOLOPAMINE TRANSDERMAL SYSTEM 1 MG/1
1 PATCH, EXTENDED RELEASE TRANSDERMAL
Status: DISCONTINUED | OUTPATIENT
Start: 2025-01-30 | End: 2025-01-30 | Stop reason: HOSPADM

## 2025-01-30 RX ORDER — ROCURONIUM BROMIDE 10 MG/ML
INJECTION, SOLUTION INTRAVENOUS
Status: DISCONTINUED | OUTPATIENT
Start: 2025-01-30 | End: 2025-01-30

## 2025-01-30 RX ORDER — FAMOTIDINE 10 MG/ML
INJECTION INTRAVENOUS
Status: DISCONTINUED | OUTPATIENT
Start: 2025-01-30 | End: 2025-01-30

## 2025-01-30 RX ORDER — LIDOCAINE HYDROCHLORIDE 10 MG/ML
INJECTION, SOLUTION EPIDURAL; INFILTRATION; INTRACAUDAL; PERINEURAL
Status: DISCONTINUED | OUTPATIENT
Start: 2025-01-30 | End: 2025-01-30

## 2025-01-30 RX ORDER — SUCCINYLCHOLINE CHLORIDE 20 MG/ML
INJECTION INTRAMUSCULAR; INTRAVENOUS
Status: DISCONTINUED | OUTPATIENT
Start: 2025-01-30 | End: 2025-01-30

## 2025-01-30 RX ORDER — HEPARIN SODIUM 1000 [USP'U]/ML
INJECTION, SOLUTION INTRAVENOUS; SUBCUTANEOUS
Status: DISCONTINUED | OUTPATIENT
Start: 2025-01-30 | End: 2025-01-30 | Stop reason: HOSPADM

## 2025-01-30 RX ORDER — FENTANYL CITRATE 50 UG/ML
INJECTION, SOLUTION INTRAMUSCULAR; INTRAVENOUS
Status: DISCONTINUED | OUTPATIENT
Start: 2025-01-30 | End: 2025-01-30

## 2025-01-30 RX ORDER — HYDROMORPHONE HYDROCHLORIDE 1 MG/ML
0.2 INJECTION, SOLUTION INTRAMUSCULAR; INTRAVENOUS; SUBCUTANEOUS EVERY 5 MIN PRN
Status: DISCONTINUED | OUTPATIENT
Start: 2025-01-30 | End: 2025-01-30 | Stop reason: HOSPADM

## 2025-01-30 RX ORDER — DIPHENHYDRAMINE HYDROCHLORIDE 50 MG/ML
12.5 INJECTION INTRAMUSCULAR; INTRAVENOUS
Status: DISCONTINUED | OUTPATIENT
Start: 2025-01-30 | End: 2025-01-30 | Stop reason: HOSPADM

## 2025-01-30 RX ORDER — ONDANSETRON HYDROCHLORIDE 2 MG/ML
4 INJECTION, SOLUTION INTRAVENOUS DAILY PRN
Status: DISCONTINUED | OUTPATIENT
Start: 2025-01-30 | End: 2025-01-30 | Stop reason: HOSPADM

## 2025-01-30 RX ADMIN — ONDANSETRON 4 MG: 2 INJECTION INTRAMUSCULAR; INTRAVENOUS at 07:01

## 2025-01-30 RX ADMIN — HYDROMORPHONE HYDROCHLORIDE 0.2 MG: 1 INJECTION, SOLUTION INTRAMUSCULAR; INTRAVENOUS; SUBCUTANEOUS at 09:01

## 2025-01-30 RX ADMIN — SUGAMMADEX 200 MG: 100 INJECTION, SOLUTION INTRAVENOUS at 09:01

## 2025-01-30 RX ADMIN — SCOPOLAMINE 1 PATCH: 1.5 PATCH, EXTENDED RELEASE TRANSDERMAL at 06:01

## 2025-01-30 RX ADMIN — DIPHENHYDRAMINE HYDROCHLORIDE 6.25 MG: 50 INJECTION INTRAMUSCULAR; INTRAVENOUS at 07:01

## 2025-01-30 RX ADMIN — ONDANSETRON 4 MG: 2 INJECTION INTRAMUSCULAR; INTRAVENOUS at 09:01

## 2025-01-30 RX ADMIN — HYDROMORPHONE HYDROCHLORIDE 0.2 MG: 1 INJECTION, SOLUTION INTRAMUSCULAR; INTRAVENOUS; SUBCUTANEOUS at 10:01

## 2025-01-30 RX ADMIN — DIATRIZOATE MEGLUMINE AND DIATRIZOATE SODIUM 30 ML: 600; 100 SOLUTION ORAL; RECTAL at 09:01

## 2025-01-30 RX ADMIN — ROCURONIUM BROMIDE 10 MG: 10 INJECTION, SOLUTION INTRAVENOUS at 07:01

## 2025-01-30 RX ADMIN — LIDOCAINE HYDROCHLORIDE 100 MG: 10 INJECTION, SOLUTION EPIDURAL; INFILTRATION; INTRACAUDAL; PERINEURAL at 07:01

## 2025-01-30 RX ADMIN — PROPOFOL 160 MG: 10 INJECTION, EMULSION INTRAVENOUS at 07:01

## 2025-01-30 RX ADMIN — ROCURONIUM BROMIDE 20 MG: 10 INJECTION, SOLUTION INTRAVENOUS at 07:01

## 2025-01-30 RX ADMIN — SODIUM CHLORIDE, SODIUM LACTATE, POTASSIUM CHLORIDE, AND CALCIUM CHLORIDE: .6; .31; .03; .02 INJECTION, SOLUTION INTRAVENOUS at 08:01

## 2025-01-30 RX ADMIN — MIDAZOLAM HYDROCHLORIDE 2 MG: 1 INJECTION, SOLUTION INTRAMUSCULAR; INTRAVENOUS at 07:01

## 2025-01-30 RX ADMIN — FENTANYL CITRATE 50 MCG: 50 INJECTION, SOLUTION INTRAMUSCULAR; INTRAVENOUS at 07:01

## 2025-01-30 RX ADMIN — FAMOTIDINE 20 MG: 10 INJECTION, SOLUTION INTRAVENOUS at 07:01

## 2025-01-30 RX ADMIN — OXYCODONE HYDROCHLORIDE 5 MG: 5 TABLET ORAL at 10:01

## 2025-01-30 RX ADMIN — ACETAMINOPHEN 1000 MG: 10 INJECTION, SOLUTION INTRAVENOUS at 07:01

## 2025-01-30 RX ADMIN — SODIUM CHLORIDE, SODIUM LACTATE, POTASSIUM CHLORIDE, AND CALCIUM CHLORIDE: .6; .31; .03; .02 INJECTION, SOLUTION INTRAVENOUS at 07:01

## 2025-01-30 RX ADMIN — DEXTROSE 2 G: 50 INJECTION, SOLUTION INTRAVENOUS at 07:01

## 2025-01-30 RX ADMIN — Medication 120 MG: at 07:01

## 2025-01-30 NOTE — ANESTHESIA PREPROCEDURE EVALUATION
01/30/2025  Kwasi Brown is a 59 y.o., female.    Patient Active Problem List   Diagnosis    Hypertension    Morbid obesity    LBBB (left bundle branch block)    ARREGUIN (dyspnea on exertion)    Essential hypertension    Dyslipidemia    Esophageal mass    Malignant neoplasm of abdominal esophagus       Past Surgical History:   Procedure Laterality Date    ADENOIDECTOMY      COLONOSCOPY W/ POLYPECTOMY  1989    Age 24 - polyps negative    HYSTERECTOMY  2009    Partial Hysterectomy    KNEE RECONSTRUCTION, MEDIAL PATELLAR FEMORAL LIGAMENT Bilateral     left ALSO RIGHT KNEE SCOPE    TONSILLECTOMY          Tobacco Use:  The patient  reports that she quit smoking about 41 years ago. Her smoking use included cigarettes. She started smoking about 53 years ago. She has a 3 pack-year smoking history. She has never used smokeless tobacco.     Results for orders placed or performed during the hospital encounter of 01/28/25   EKG 12-lead    Collection Time: 01/28/25 10:12 AM   Result Value Ref Range    QRS Duration 150 ms    [facility] QTC Calculation 481 ms    Narrative    Test Reason : C15.5,    Vent. Rate :  78 BPM     Atrial Rate :  78 BPM     P-R Int : 164 ms          QRS Dur : 150 ms      QT Int : 422 ms       P-R-T Axes :  51 -48  98 degrees    QTcB Int : 481 ms    Normal sinus rhythm  Left axis deviation  Left bundle branch block  Abnormal ECG  No previous ECGs available    Referred By:            Confirmed By:              Lab Results   Component Value Date    WBC 9.23 01/28/2025    HGB 10.2 (L) 01/28/2025    HCT 32.5 (L) 01/28/2025    MCV 86 01/28/2025     01/28/2025     BMP  Lab Results   Component Value Date     01/28/2025    K 4.6 01/28/2025    CL 98 01/28/2025    CO2 31 (H) 01/28/2025    BUN 22 01/28/2025    CREATININE 0.5 01/28/2025    CALCIUM 9.4 01/28/2025    ANIONGAP 7 (L) 01/28/2025      01/28/2025     (H) 09/13/2024     10/26/2022       No results found for this or any previous visit.            Pre-op Assessment    I have reviewed the Patient Summary Reports.     I have reviewed the Nursing Notes. I have reviewed the NPO Status.   I have reviewed the Medications.     Review of Systems  Anesthesia Hx:  No problems with previous Anesthesia             Denies Family Hx of Anesthesia complications.    Denies Personal Hx of Anesthesia complications.                    Social:  Non-Smoker       Hematology/Oncology:  Hematology Normal                     Current/Recent Cancer. (Esophageal)         radiation       EENT/Dental:  EENT/Dental Normal           Cardiovascular:     Hypertension    Dysrhythmias (LBBB)       hyperlipidemia ARREGUIN  ECG has been reviewed.                            Pulmonary:  Pulmonary Normal                       Renal/:  Renal/ Normal                 Hepatic/GI:  Hepatic/GI Normal       Constant nausea and dry heaving               Musculoskeletal:  Musculoskeletal Normal                Neurological:  Neurology Normal                                      Endocrine:  Endocrine Normal            Psych:  Psychiatric Normal                    Physical Exam  General: Well nourished    Airway:  Mallampati: II   Mouth Opening: Normal  TM Distance: Normal  Tongue: Normal  Neck ROM: Normal ROM    Dental:  Intact    Chest/Lungs:  Clear to auscultation, Normal Respiratory Rate    Heart:  Rate: Normal  Rhythm: Regular Rhythm        Anesthesia Plan  Type of Anesthesia, risks & benefits discussed:    Anesthesia Type: Gen ETT  Intra-op Monitoring Plan: Standard ASA Monitors  Post Op Pain Control Plan: IV/PO Opioids PRN and multimodal analgesia  Induction:  IV and rapid sequence  Airway Plan: Video  Informed Consent: Informed consent signed with the Patient and all parties understand the risks and agree with anesthesia plan.  All questions answered.   ASA Score: 3  Anesthesia  Plan Notes: GETA  RSI 7 ETT  Multimodal analgesia with ofirmev 1000mg, and local   PONV prophylaxis with Pepcid 20 mg IV, and Zofran 4 mg IV, scopolamine patch.      Ready For Surgery From Anesthesia Perspective.     .

## 2025-01-30 NOTE — H&P
GENERAL SURGERY  OUTPATIENT H&P    REASON FOR VISIT/CC: Esophageal cancer    HPI: Kwasi Brown is a 59 y.o. female referred for evaluation of port and feeding tube for esophageal/GE junction adenocarcinoma.  Has met with surgical oncology not a current surgical candidate. Endoscopy revealed a very narrowed lumen therefore PEG likely not feasible. PET scan has confirmed supraclavicular lymph nodes.    I have reviewed the patient's chart including prior progress notes, procedures and testing.     ROS:   Review of Systems    PROBLEM LIST:  Patient Active Problem List   Diagnosis    Hypertension    Morbid obesity    LBBB (left bundle branch block)    ARREGUIN (dyspnea on exertion)    Essential hypertension    Dyslipidemia    Esophageal mass    Malignant neoplasm of abdominal esophagus         HISTORY  Past Medical History:   Diagnosis Date    Esophageal cancer 2025    Hypertension 2009    Obesity        Past Surgical History:   Procedure Laterality Date    ADENOIDECTOMY      COLONOSCOPY W/ POLYPECTOMY      Age 24 - polyps negative    HYSTERECTOMY  2009    Partial Hysterectomy    KNEE RECONSTRUCTION, MEDIAL PATELLAR FEMORAL LIGAMENT Bilateral     left ALSO RIGHT KNEE SCOPE    TONSILLECTOMY         Social History     Tobacco Use    Smoking status: Former     Current packs/day: 0.00     Average packs/day: 0.3 packs/day for 12.0 years (3.0 ttl pk-yrs)     Types: Cigarettes     Start date: 1971     Quit date: 1983     Years since quittin.4    Smokeless tobacco: Never   Substance Use Topics    Alcohol use: Yes     Comment: occasional    Drug use: No       Family History   Problem Relation Name Age of Onset    Tuberculosis Mother      COPD Mother      Heart disease Mother  70    Hypertension Mother      Hypertension Father      Heart disease Father  78    Diabetes Father      Diabetes Brother  1        type 1    Heart disease Brother      Hypertension Brother      Coronary artery disease Unknown       Hypertension Unknown      Diabetes Unknown           MEDS:  No current facility-administered medications on file prior to visit.     Current Outpatient Medications on File Prior to Visit   Medication Sig Dispense Refill    ALPRAZolam (XANAX) 0.5 MG tablet Take 1 tablet (0.5 mg total) by mouth 3 (three) times daily as needed for Insomnia or Anxiety. 30 tablet 0    benazepril-hydrochlorthiazide (LOTENSIN HCT) 20-12.5 mg per tablet Take 1 tablet by mouth once daily. 90 tablet 3    cetirizine (ZYRTEC) 10 MG tablet Take 10 mg by mouth once daily.      fluticasone propionate (FLONASE) 50 mcg/actuation nasal spray 1 spray by Each Nostril route once daily.      lysine 1,000 mg Tab Take 1 tablet by mouth Daily.      multivitamin capsule Take 1 capsule by mouth once daily.      naproxen sodium (ANAPROX) 220 MG tablet Take 220 mg by mouth every 12 (twelve) hours.      omega 3-dha-epa-fish oil 60- mg CpDR Take 1 capsule by mouth Daily.      ondansetron (ZOFRAN) 8 MG tablet Take 1 tablet (8 mg total) by mouth every 12 (twelve) hours as needed for Nausea. 30 tablet 2       ALLERGIES:  Review of patient's allergies indicates:  No Known Allergies      VITALS:  Vitals:    01/24/25 0926   BP: 139/79   Pulse: 85   Temp: 98.1 °F (36.7 °C)         PHYSICAL EXAM:  Physical Exam  Vitals reviewed.   Constitutional:       General: She is not in acute distress.     Appearance: Normal appearance. She is well-developed.   HENT:      Head: Normocephalic and atraumatic.      Nose: Nose normal.   Eyes:      General: No scleral icterus.     Conjunctiva/sclera: Conjunctivae normal.   Neck:      Trachea: No tracheal tenderness or tracheal deviation.   Cardiovascular:      Rate and Rhythm: Normal rate and regular rhythm.      Pulses: Normal pulses.   Pulmonary:      Effort: Pulmonary effort is normal. No accessory muscle usage or respiratory distress.      Breath sounds: Normal breath sounds.   Abdominal:      General: There is no distension.       Palpations: Abdomen is soft.      Tenderness: There is no abdominal tenderness.   Musculoskeletal:         General: No swelling or tenderness. Normal range of motion.      Cervical back: Normal range of motion and neck supple. No rigidity.   Skin:     General: Skin is warm and dry.      Coloration: Skin is not jaundiced.      Findings: No erythema.   Neurological:      General: No focal deficit present.      Mental Status: She is alert and oriented to person, place, and time.      Motor: No weakness or abnormal muscle tone.   Psychiatric:         Mood and Affect: Mood normal.         Behavior: Behavior normal.         Thought Content: Thought content normal.         Judgment: Judgment normal.           LABS:  Lab Results   Component Value Date    WBC 9.23 01/28/2025    RBC 3.76 (L) 01/28/2025    HGB 10.2 (L) 01/28/2025    HCT 32.5 (L) 01/28/2025     01/28/2025     Lab Results   Component Value Date     01/28/2025     01/28/2025    K 4.6 01/28/2025    CL 98 01/28/2025    CO2 31 (H) 01/28/2025    BUN 22 01/28/2025    CREATININE 0.5 01/28/2025    CALCIUM 9.4 01/28/2025     Lab Results   Component Value Date    ALT 44 01/28/2025    AST 34 01/28/2025    ALKPHOS 118 01/28/2025    BILITOT 0.4 01/28/2025     Lab Results   Component Value Date    MG 2.2 10/06/2010         ASSESSMENT & PLAN:  59 y.o. female with esophageal adenocarcinoma  - we discussed the indication for port placement to ensure secure and easy access to the venous system for chemotherapy infusion   - the procedure for placement as well as associated risk of pain, bleeding, scarring, infection, need to remove port, port malfunction, port malpositioning, injury to artery, injury to lung, arrhythmia, malfunction, hematoma, thrombus, etc.  were reviewed, patient expressed understanding these risks and agreed proceed with surgical intervention  -we also discussed indication for gastric tube placement to ensure adequate ongoing nutrition  and medication administration  -procedure was discussed and recommended undergo laparoscopic G-tube placement to avoid the very narrowed esophageal lumen which he would have to be traverse during PEG, risks including pain, bleeding, scarring, infection, tube dislodgement, injury to other organ, etc. reviewed, patient expressed understanding these risks agreed proceed with surgical intervention  -to OR on 01/30/2025

## 2025-01-30 NOTE — OP NOTE
DATE OF PROCEDURE: 01/30/2025    PREOPERATIVE DIAGNOSIS: GE junction adenocarcinoma    POSTOPERATIVE DIAGNOSIS: Same     PROCEDURE: Left IJ Port-A-Cath Placement  Laparoscopic gastrostomy tube placement    SURGEON: Patrick Whelan M.D.    ASSISTANT: None    ANESTHESIA: General    ESTIMATED BLOOD LOSS: Minimal    INDICATION: Access for infusions    FINDINGS:  Port  -  the IJ was found to be widely patent on US evaluation  -  IJ accessed using ultrasound with return of dark red nonpulsatile blood.  -  Wire easily passed and confirmed in the venous system on fluoro  -  Catheter tip positioned at the atrial caval junction under fluoro  -  Port aspirated and flushed with ease.    Lap G-tube  -evidence of locally advanced disease near the cardia and lesser omentum of the stomach, no other intra-abdominal abnormalities noted  -twenty [place] G-tube placed in the body of the stomach, initially had concerns for a possible injury to the posterior wall however lesser sac entered and no obvious abnormality noted  -will obtain Gastrografin study postoperatively    PROCEDURE IN DETAIL:  The patient was identified in the preoperative unit and taken back to the operating room and laid supine on the operating room table. IV antibiotics were administered prior to the start of anesthesia. General anesthesia was administered without complication. The patient was then prepped and draped in the standard sterile fashion.     The ultrasound was used to identify the left internal jugular vein.  Local anesthetic was injected and then a small skin nick was made.  The patient was placed into the Trendelenburg position and an 18g needle was used to access the vein under ultrasound guidance.We had return of dark red, non-pulsatile blood. The wire was advanced without issue under fluoroscopic guidance. We turned our attention to creation of the port pocket in the upper chest. Local anesthetic was injected into the upper chest in the location of  the port pocket and along the path of the planned catheter tunnel. An incision was made sharpley and the subcutaneous tissue was dissected until an appropriate sized pocket was created to accommodate the port. Once we were satisfied with the pocket, the catheter was flushed and tunneled from the chest to the neck incision. Under fluoroscopic guidance the dilator and sheath were placed over the wire using Seldinger technique. The dilator and wire were removed and the catheter was introduced and fed into the sheath as the sheath was peeled away. Fluoro was used to confirm the position of the catheter in the atriocaval junction and confirm that the catheter was not kinked. The catheter was then cut to size and attached to the port which was then placed in the chest wall pocket. The port was then access and easily aspirated blood. It was then flushed with heparinized saline. Hemostasis was assured and the dermis of the port pocket was re-approximated with 3-0 vicryl suture in an interrupted fashion. The skin was re-approximated using 4-0 monocryl suture in a running fashion. Dermabond was then applied.     We then turned our attention to the laparoscopic G-tube.  A Veress needle was inserted at griffith's point attached insufflation.  We had appropriate initial pressures and pneumoperitoneum was achieved. Local anesthetic was injected and a stab incision was made in the right mid lateral abdomen. Optiview entry was performed without issue.  Veress site was without issue.  Initial examination of the abdomen revealed no obvious peritoneal disease or lesions in the liver. Looking above the left lateral lobe of the liver however there was bulky lymphadenopathy along the lesser omentum. An additional 5 mm trocar was placed infraumbilically. The insufflation was decreased. The G-tube insertion site was chosen in the left upper abdomen where the stomach would easily approximate the anterior abdominal wall.  Local anesthetic was  injected and a stab incision was made. T-fasteners were placed x3 through the anterior wall of the stomach to elevate it. A needle was then inserted through the stab incision in between the T-fasteners into the stomach and a guidewire was passed. This has then serially dilated leaving behind the sheath.  A 20 Trin[place] G-tube was then inserted and the sheath was peeled away.  The balloon was insufflated and a approximated to the anterior abdominal wall. The stomach felt somewhat heavy and tethered and there were some concern about possibly placing it through the posterior wall of the stomach. An additional 5 mm incision was made in the left mid lateral abdomen. Using a LigaSure I then entered through the gastrocolic ligament into the lesser sac. Exploration of this revealed no obvious posterior wall injury. We repositioned the G-tube and inflated the balloon again.  We then filled the stomach with air and did not have any leakage.  Water was infiltrated through the G-tube and saw no obvious leakage into the lesser sac. No obvious posterior wall injury was noted. We then approximated the G-tube to the anterior abdominal wall at a proximally 4 cm at the skin. The bumper was tied down to help prevent slippage. The abdomen was desufflated further in the trocars were removed. Skin incisions were closed with a Monocryl subcuticular stitches and Dermabond.    The patient was awakened from anesthesia without complication and returned to the postoperative recovery unit in stable condition. At the end of the case, sponge, instrument, and needle counts were correct and hemostasis was achieved. I was present and scrubbed throughout the entirety of the case except for final skin closure. A post-operative CXR will be obtained to confirm appropriate catheter location and to rule out pneumothorax. A Gastrografin study will be obtained for the stomach.    All images were personally interpreted by me and stored.    COMPLICATIONS:  None    CONDITION:  Stable    DISPO: To PACU then home after CXR    Patrick Whelan Jr

## 2025-01-30 NOTE — TRANSFER OF CARE
"Anesthesia Transfer of Care Note    Patient: Kwasi Brown    Procedure(s) Performed: Procedure(s) (LRB):  [id number] (Left)  INSERTION, GASTROSTOMY TUBE, LAPAROSCOPIC (N/A)    Patient location: PACU    Anesthesia Type: general    Transport from OR: Transported from OR on room air with adequate spontaneous ventilation    Post pain: adequate analgesia    Post assessment: no apparent anesthetic complications    Post vital signs: stable    Level of consciousness: awake    Nausea/Vomiting: no nausea/vomiting    Complications: none    Transfer of care protocol was followed      Last vitals: Visit Vitals  /81 (BP Location: Left arm, Patient Position: Lying)   Pulse 96   Temp 36.9 °C (98.4 °F) (Oral)   Resp 16   Ht 5' 7" (1.702 m)   Wt 95 kg (209 lb 7 oz)   SpO2 95%   Breastfeeding No   BMI 32.80 kg/m²     "

## 2025-01-30 NOTE — PROGRESS NOTES
Called pt  to see if she was coming for rad thy today   stated the pt was in bed sleeping   stated that she had port and peg placed today and was discharged around noon and went straight to bed and is still sleeping  Informed pt that rad thy would call back to reschedule

## 2025-01-30 NOTE — ANESTHESIA PROCEDURE NOTES
Intubation    Date/Time: 1/30/2025 7:23 AM    Performed by: Niecy Iyer CRNA  Authorized by: Bi Merino Jr., MD    Intubation:     Induction:  Rapid sequence induction    Intubated:  Postinduction    Mask Ventilation:  Not attempted    Attempts:  1    Attempted By:  CRNA    Method of Intubation:  Video laryngoscopy    Blade:  Eagle 3    Laryngeal View Grade: Grade I - full view of cords      Difficult Airway Encountered?: No      Complications:  None    Airway Device:  Oral endotracheal tube    Airway Device Size:  7.0    Style/Cuff Inflation:  Cuffed    Tube secured:  22    Secured at:  The lips    Placement Verified By:  Capnometry and Revisualization with laryngoscopy    Complicating Factors:  None    Findings Post-Intubation:  BS equal bilateral and atraumatic/condition of teeth unchanged

## 2025-01-30 NOTE — ANESTHESIA POSTPROCEDURE EVALUATION
Anesthesia Post Evaluation    Patient: Kwasi Brown    Procedure(s) Performed: Procedure(s) (LRB):  [id number] (Left)  INSERTION, GASTROSTOMY TUBE, LAPAROSCOPIC (N/A)    Final Anesthesia Type: general      Patient location during evaluation: PACU  Patient participation: Yes- Able to Participate  Level of consciousness: awake and alert  Post-procedure vital signs: reviewed and stable  Pain management: adequate  Airway patency: patent    PONV status at discharge: No PONV  Anesthetic complications: no      Cardiovascular status: blood pressure returned to baseline and stable  Respiratory status: unassisted and room air  Hydration status: euvolemic  Follow-up not needed.              Vitals Value Taken Time   /69 01/30/25 1015   Temp 36.3 °C (97.3 °F) 01/30/25 0945   Pulse 78 01/30/25 1019   Resp 18 01/30/25 1019   SpO2 100 % 01/30/25 1018   Vitals shown include unfiled device data.      Event Time   Out of Recovery 10:23:09         Pain/Pricila Score: Pain Rating Prior to Med Admin: 3 (1/30/2025 10:10 AM)  Pricila Score: 10 (1/30/2025 10:15 AM)

## 2025-01-30 NOTE — DISCHARGE SUMMARY
Critical access hospital  Discharge Note  Short Stay    Procedure(s) (LRB):  [id number] (Left)  INSERTION, GASTROSTOMY TUBE, LAPAROSCOPIC (N/A)      OUTCOME: Patient tolerated treatment/procedure well without complication and is now ready for discharge.    DISPOSITION: Home or Self Care    FINAL DIAGNOSIS:  <principal problem not specified>    FOLLOWUP: In clinic    DISCHARGE INSTRUCTIONS:    Discharge Procedure Orders   Diet Adult Regular     Ice to affected area     Lifting restrictions   Order Comments: Please avoid lifting greater than 40 lb, straining, strenuous activity for one week.     Change dressing (specify)   Order Comments: Post-Operative Wound Care    A surgical glue has been placed over your incisions, please leave the glue in place and do not attempt to remove it.  It is ok to shower using mild soap and water over the incisions the day after your procedure. Pat dry your incisions. Do not soak in a bathtub or other body of water.      Wash carefully around g-tube    If you noticed redness, swelling, fever, increasing pain or significant drainage from your wound please call/message the office or the 24 hr nurse hotline after hours.     Notify your health care provider if you experience any of the following:  temperature >100.4     Notify your health care provider if you experience any of the following:  persistent nausea and vomiting or diarrhea     Notify your health care provider if you experience any of the following:  severe uncontrolled pain     Notify your health care provider if you experience any of the following:  redness, tenderness, or signs of infection (pain, swelling, redness, odor or green/yellow discharge around incision site)     Notify your health care provider if you experience any of the following:  worsening rash     Notify your health care provider if you experience any of the following:  increased confusion or weakness     Shower on day dressing removed (No bath)         TIME SPENT ON DISCHARGE: 10 minutes

## 2025-01-31 ENCOUNTER — OFFICE VISIT (OUTPATIENT)
Dept: HEMATOLOGY/ONCOLOGY | Facility: CLINIC | Age: 60
End: 2025-01-31
Payer: COMMERCIAL

## 2025-01-31 ENCOUNTER — LAB VISIT (OUTPATIENT)
Dept: LAB | Facility: HOSPITAL | Age: 60
End: 2025-01-31
Attending: NURSE PRACTITIONER
Payer: COMMERCIAL

## 2025-01-31 ENCOUNTER — DOCUMENTATION ONLY (OUTPATIENT)
Dept: NUTRITION | Facility: HOSPITAL | Age: 60
End: 2025-01-31

## 2025-01-31 VITALS
OXYGEN SATURATION: 98 % | HEART RATE: 87 BPM | RESPIRATION RATE: 18 BRPM | DIASTOLIC BLOOD PRESSURE: 79 MMHG | WEIGHT: 213.19 LBS | TEMPERATURE: 98 F | BODY MASS INDEX: 33.46 KG/M2 | HEIGHT: 67 IN | SYSTOLIC BLOOD PRESSURE: 160 MMHG

## 2025-01-31 DIAGNOSIS — C15.5 MALIGNANT NEOPLASM OF ABDOMINAL ESOPHAGUS: Primary | ICD-10-CM

## 2025-01-31 DIAGNOSIS — C15.5 MALIGNANT NEOPLASM OF ABDOMINAL ESOPHAGUS: ICD-10-CM

## 2025-01-31 LAB
OHS QRS DURATION: 150 MS
OHS QTC CALCULATION: 481 MS

## 2025-01-31 PROCEDURE — 99215 OFFICE O/P EST HI 40 MIN: CPT | Mod: S$GLB,,, | Performed by: NURSE PRACTITIONER

## 2025-01-31 PROCEDURE — 36415 COLL VENOUS BLD VENIPUNCTURE: CPT | Performed by: NURSE PRACTITIONER

## 2025-01-31 PROCEDURE — 1160F RVW MEDS BY RX/DR IN RCRD: CPT | Mod: CPTII,S$GLB,, | Performed by: NURSE PRACTITIONER

## 2025-01-31 PROCEDURE — 1159F MED LIST DOCD IN RCRD: CPT | Mod: CPTII,S$GLB,, | Performed by: NURSE PRACTITIONER

## 2025-01-31 PROCEDURE — 99999 PR PBB SHADOW E&M-EST. PATIENT-LVL V: CPT | Mod: PBBFAC,,, | Performed by: NURSE PRACTITIONER

## 2025-01-31 PROCEDURE — 4010F ACE/ARB THERAPY RXD/TAKEN: CPT | Mod: CPTII,S$GLB,, | Performed by: NURSE PRACTITIONER

## 2025-01-31 PROCEDURE — 3078F DIAST BP <80 MM HG: CPT | Mod: CPTII,S$GLB,, | Performed by: NURSE PRACTITIONER

## 2025-01-31 PROCEDURE — 3077F SYST BP >= 140 MM HG: CPT | Mod: CPTII,S$GLB,, | Performed by: NURSE PRACTITIONER

## 2025-01-31 PROCEDURE — 3008F BODY MASS INDEX DOCD: CPT | Mod: CPTII,S$GLB,, | Performed by: NURSE PRACTITIONER

## 2025-01-31 RX ORDER — DEXAMETHASONE 4 MG/1
TABLET ORAL
Qty: 30 TABLET | Refills: 3 | Status: SHIPPED | OUTPATIENT
Start: 2025-01-31

## 2025-01-31 RX ORDER — LIDOCAINE AND PRILOCAINE 25; 25 MG/G; MG/G
CREAM TOPICAL
Qty: 30 G | Refills: 0 | Status: SHIPPED | OUTPATIENT
Start: 2025-01-31

## 2025-01-31 RX ORDER — SCOLOPAMINE TRANSDERMAL SYSTEM 1 MG/1
1 PATCH, EXTENDED RELEASE TRANSDERMAL
Qty: 10 PATCH | Refills: 1 | Status: SHIPPED | OUTPATIENT
Start: 2025-01-31

## 2025-01-31 RX ORDER — ONDANSETRON 8 MG/1
8 TABLET, ORALLY DISINTEGRATING ORAL EVERY 8 HOURS PRN
Qty: 60 TABLET | Refills: 5 | Status: SHIPPED | OUTPATIENT
Start: 2025-01-31

## 2025-01-31 NOTE — PROGRESS NOTES
FOLLOW-UP APPOINTMENT    PATIENT:   Kwasi Brown  :    1965  MR#:    [id number]  DATE OF VISIT:  2025      Chief Complaint:  Esophageal cancer    HPI:   Ms. Kwasi Brown presents today for chemotherapy education.  She will be starting treatment with FLOT every 2 weeks for the above diagnosis.       Review of Systems   Constitutional: Negative.  Negative for appetite change and unexpected weight change.   HENT:  Negative.  Negative for mouth sores.    Eyes: Negative.    Respiratory: Negative.  Negative for cough and shortness of breath.    Cardiovascular: Negative.  Negative for chest pain.   Gastrointestinal: Negative.  Negative for abdominal pain and diarrhea.   Endocrine: Negative.    Genitourinary: Negative.  Negative for frequency.    Musculoskeletal: Negative.  Negative for back pain.   Skin:  Negative for rash.   Neurological: Negative.  Negative for headaches.   Hematological: Negative.  Negative for adenopathy.   Psychiatric/Behavioral: Negative.       Oncology History   Malignant neoplasm of abdominal esophagus   2025 Cancer Staged    Staging form: Esophagus - Adenocarcinoma, AJCC 8th Edition  - Clinical stage from 2025: Stage HUYEN (cT4a, cN2, cM0, G3)     1/15/2025 Initial Diagnosis    Malignant neoplasm of abdominal esophagus     1/15/2025 -  Chemotherapy    Treatment Summary   Plan Name: OP FLOT - FLUOROURACIL LEUCOVORIN OXALIPLATIN DOCETAXEL Q2W  Treatment Goal: Curative  Status: Active  Start Date: 1/15/2025 (Planned)  End Date: 2025 (Planned)  Provider: Aurash Khoobehi, MD  Chemotherapy: DOCETAXEL CHEMO INFUSION, 50 mg/m2, Intravenous, Clinic/HOD 1 time, 0 of 8 cycles  OXALIPLATIN CHEMO INFUSION, 85 mg/m2, Intravenous, Clinic/HOD 1 time, 0 of 8 cycles         Patient Active Problem List   Diagnosis    Hypertension    Morbid obesity    LBBB (left bundle branch block)    ARREGUIN (dyspnea on exertion)    Essential hypertension    Dyslipidemia    Esophageal mass     Malignant neoplasm of abdominal esophagus       Past Medical History:   Diagnosis Date    Esophageal cancer 2025    Hypertension 2009    Obesity        Past Surgical History:   Procedure Laterality Date    ADENOIDECTOMY      COLONOSCOPY W/ POLYPECTOMY      Age 24 - polyps negative    HYSTERECTOMY  2009    Partial Hysterectomy    INSERTION OF TUNNELED CENTRAL VENOUS CATHETER (CVC) WITH SUBCUTANEOUS PORT Left 2025    Procedure: [id number]-GOEL-W-CATH;  Surgeon: Patrick Whelan Jr., MD;  Location: [facility] OR;  Service: General;  Laterality: Left;    INSERTION, GASTROSTOMY TUBE, LAPAROSCOPIC N/A 2025    Procedure: INSERTION, GASTROSTOMY TUBE, LAPAROSCOPIC;  Surgeon: Patrick Whelan Jr., MD;  Location: [facility] OR;  Service: General;  Laterality: N/A;    KNEE RECONSTRUCTION, MEDIAL PATELLAR FEMORAL LIGAMENT Bilateral     left ALSO RIGHT KNEE SCOPE    TONSILLECTOMY         Social History     Socioeconomic History    Marital status:    Occupational History     Employer: [employer]   Tobacco Use    Smoking status: Former     Current packs/day: 0.00     Average packs/day: 0.3 packs/day for 12.0 years (3.0 ttl pk-yrs)     Types: Cigarettes     Start date: 1971     Quit date: 1983     Years since quittin.4    Smokeless tobacco: Never   Substance and Sexual Activity    Alcohol use: Yes     Comment: occasional    Drug use: No     Social Drivers of Health     Financial Resource Strain: Low Risk  (2025)    Overall Financial Resource Strain (CARDIA)     Difficulty of Paying Living Expenses: Not hard at all   Food Insecurity: No Food Insecurity (2025)    Hunger Vital Sign     Worried About Running Out of Food in the Last Year: Never true     Ran Out of Food in the Last Year: Never true   Physical Activity: Inactive (2025)    Exercise Vital Sign     Days of Exercise per Week: 0 days     Minutes of Exercise per Session: 10 min   Stress: No Stress Concern  Present (1/13/2025)    [facility] - Occupational Stress Questionnaire     Feeling of Stress : Only a little   Housing Stability: Unknown (1/13/2025)    Housing Stability Vital Sign     Unable to Pay for Housing in the Last Year: No       Family History   Problem Relation Name Age of Onset    Tuberculosis Mother      COPD Mother      Heart disease Mother  70    Hypertension Mother      Hypertension Father      Heart disease Father  78    Diabetes Father      Diabetes Brother  1        type 1    Heart disease Brother      Hypertension Brother      Coronary artery disease Unknown      Hypertension Unknown      Diabetes Unknown           Current Outpatient Medications:     ALPRAZolam (XANAX) 0.5 MG tablet, Take 1 tablet (0.5 mg total) by mouth 3 (three) times daily as needed for Insomnia or Anxiety., Disp: 30 tablet, Rfl: 0    benazepril-hydrochlorthiazide (LOTENSIN HCT) 20-12.5 mg per tablet, Take 1 tablet by mouth once daily., Disp: 90 tablet, Rfl: 3    cetirizine (ZYRTEC) 10 MG tablet, Take 10 mg by mouth once daily., Disp: , Rfl:     fluticasone propionate (FLONASE) 50 mcg/actuation nasal spray, 1 spray by Each Nostril route once daily., Disp: , Rfl:     hydrocodone-acetaminophen (HYCET) solution 7.5-325 mg/15mL, Take 15 mLs by mouth every 4 to 6 hours as needed for Pain., Disp: 750 mL, Rfl: 0    lysine 1,000 mg Tab, Take 1 tablet by mouth Daily., Disp: , Rfl:     multivitamin capsule, Take 1 capsule by mouth once daily., Disp: , Rfl:     naproxen sodium (ANAPROX) 220 MG tablet, Take 220 mg by mouth every 12 (twelve) hours., Disp: , Rfl:     omega 3-dha-epa-fish oil 60- mg CpDR, Take 1 capsule by mouth Daily., Disp: , Rfl:     ondansetron (ZOFRAN) 8 MG tablet, Take 1 tablet (8 mg total) by mouth every 12 (twelve) hours as needed for Nausea., Disp: 30 tablet, Rfl: 2    promethazine (PHENERGAN) 25 MG tablet, Take 1 tablet (25 mg total) by mouth every 6  (six) hours as needed for Nausea., Disp: 30 tablet, Rfl: 1  No current facility-administered medications for this visit.    Review of patient's allergies indicates:  No Known Allergies    Physcial Examination  VITAL SIGNS:    There is no height or weight on file to calculate BSA.   Pain Assessment: Controlled   There were no vitals filed for this visit.       Wt Readings from Last 5 Encounters:   01/30/25 95 kg (209 lb 7 oz)   01/28/25 95 kg (209 lb 7 oz)   01/24/25 95.8 kg (211 lb 3.2 oz)   01/24/25 93.9 kg (207 lb)   01/13/25 98.2 kg (216 lb 6.4 oz)       GENERAL:  Kwasi Brown is healthy-appearing 59 y.o. female, in no distress.   EYES:   Pupils equal, round, reactive.  Conjunctivae, sclera and lids normal.  HEENT: Head normocephalic and atraumatic, without alopecia.  Oropharynx is unremarkable.  No icterus, jaundice, stomatitis, mucositis, or ulceration is noted.  Ears are clear and unremarkable.  Nose, nares, and septum are unremarkable.    NECK:   No masses.  Thyroid and trachea are normal.    BREASTS:  Deferred.  RESPIRATORY: Clear to auscultation bilaterally.  Symmetrically effortless expansion.  No wheezing and no stridor.    CV: Heart reveals regular rate and rhythm without murmur, rub, or gallops.  ABDOMEN: Soft, non-tender.  No masses, no hernias, and no rebound or rigidity are noted.  /RECTAL:  Deferred.  LYMPHATICS: No preauricular, submandibular, cervical, supraclavicular, axillary, lymphadenopathy.  MUSCULOSKELETAL:Fair musculature, no atrophy.  No arthritic changes.  No edema or cyanosis. Back is without gross abnormal curvature.   NEUROLOGICAL: Cranial nerves II-XII grossly intact.  Motor and sensory exam intact.  SKIN:   No lesions, bruises, petechiae or rashes.  Good turgor.    PSYCHIATRIC: Patient is alert and oriented to time, place and person.  Mood and affect are appropriate.         Laboratory and Radiology   Lab Results   Component Value Date    WBC 9.23 01/28/2025    RBC 3.76 (L)  01/28/2025    HGB 10.2 (L) 01/28/2025    HCT 32.5 (L) 01/28/2025    MCV 86 01/28/2025    MCH 27.1 01/28/2025    MCHC 31.4 (L) 01/28/2025    RDW 14.6 (H) 01/28/2025     01/28/2025    MPV 10.0 01/28/2025    GRAN 6.4 01/28/2025    GRAN 69.8 01/28/2025    LYMPH 1.6 01/28/2025    LYMPH 17.1 (L) 01/28/2025    MONO 1.0 01/28/2025    MONO 11.3 01/28/2025    EOS 0.1 01/28/2025    BASO 0.04 01/28/2025    EOSINOPHIL 1.1 01/28/2025    BASOPHIL 0.4 01/28/2025     BMP  Lab Results   Component Value Date     01/28/2025    K 4.6 01/28/2025    CL 98 01/28/2025    CO2 31 (H) 01/28/2025    BUN 22 01/28/2025    CREATININE 0.5 01/28/2025    CALCIUM 9.4 01/28/2025    ANIONGAP 7 (L) 01/28/2025    ESTGFRAFRICA >60.0 02/23/2022    EGFRNONAA >60.0 02/23/2022     Lab Results   Component Value Date    ALT 44 01/28/2025    AST 34 01/28/2025    ALKPHOS 118 01/28/2025    BILITOT 0.4 01/28/2025     Results for orders placed or performed during the hospital encounter of 01/07/25 (from the past 2160 hours)   CT Abdomen Pelvis W Wo Contrast    Narrative    EXAMINATION:  CT ABDOMEN PELVIS W WO CONTRAST; CT CHEST WITHOUT CONTRAST    CLINICAL HISTORY:  R13.10 K22.89 R63.4;Dysphagia, unspecified    TECHNIQUE:  Axial images through the chest were acquired without contrast.    Additionally, low dose axial images, sagittal and coronal reformations were obtained from the lung bases to the pubic symphysis before and following the IV administration of 100 mL of Omnipaque 350.  30 mL of oral Omnipaque 300 was also administered.    COMPARISON:  None    FINDINGS:  A lower esophageal mass is present, circumferential involving the esophagus and extending to involve the gastric cardia, measuring 7.6 x 4.8 x 8.2 cm (transverse by AP by craniocaudal).  This results in significant luminal narrowing, without complete obstruction to oral contrast.  There is significant multifocal nodular periesophageal extension of the mass.  Presumed conglomerate adenopathy  is present within the gastrohepatic ligament, measuring 7.0 x 5.9 cm.  There is periesophageal and retrocrural lymphadenopathy.  A retrocrural index node measures 1.9 cm short axis.  A retroperitoneal jeanne mass anterior to the aorta just below the level of the left renal vein measures 4.0 x 2.2 cm.    The heart size is normal without pericardial effusion.  There is broad abutment of the cardiac base by the mass,, with pericardial invasion not definitively seen but not excluded.    A solid nodule is present along the minor fissure measuring 5 mm, nonspecific (series 4, image 245).  There is no pleural effusion.    There are no focal hepatic lesions.  Numerous gallstones are present.  Pancreas, spleen, adrenal glands, and kidneys are unremarkable.  The bowel is nondilated.  There is mild diverticulosis coli.  There has been hysterectomy.    No suspicious osseous lesions.      Impression    Large lower esophageal mass compatible with neoplasm.  Periesophageal invasion and adenopathy, involvement of the gastric cardia, and spread of disease below the diaphragm.  This includes conglomerate gastrohepatic ligament adenopathy and additional adenopathy as above.  Broad cardiac abutment is present, without definitive pericardial invasion, but difficult to exclude.    Multiple cholelithiasis.    Right lung nodule, likely benign but attention on follow-up recommended.    This report was flagged in Epic as abnormal.      Electronically signed by: Tobias Caputo MD  Date:    01/07/2025  Time:    16:36   Results for orders placed or performed during the hospital encounter of 01/07/25 (from the past 2160 hours)   CT Chest Without Contrast    Narrative    EXAMINATION:  CT ABDOMEN PELVIS W WO CONTRAST; CT CHEST WITHOUT CONTRAST    CLINICAL HISTORY:  R13.10 K22.89 R63.4;Dysphagia, unspecified    TECHNIQUE:  Axial images through the chest were acquired without contrast.    Additionally, low dose axial images, sagittal and coronal  reformations were obtained from the lung bases to the pubic symphysis before and following the IV administration of 100 mL of Omnipaque 350.  30 mL of oral Omnipaque 300 was also administered.    COMPARISON:  None    FINDINGS:  A lower esophageal mass is present, circumferential involving the esophagus and extending to involve the gastric cardia, measuring 7.6 x 4.8 x 8.2 cm (transverse by AP by craniocaudal).  This results in significant luminal narrowing, without complete obstruction to oral contrast.  There is significant multifocal nodular periesophageal extension of the mass.  Presumed conglomerate adenopathy is present within the gastrohepatic ligament, measuring 7.0 x 5.9 cm.  There is periesophageal and retrocrural lymphadenopathy.  A retrocrural index node measures 1.9 cm short axis.  A retroperitoneal jeanne mass anterior to the aorta just below the level of the left renal vein measures 4.0 x 2.2 cm.    The heart size is normal without pericardial effusion.  There is broad abutment of the cardiac base by the mass,, with pericardial invasion not definitively seen but not excluded.    A solid nodule is present along the minor fissure measuring 5 mm, nonspecific (series 4, image 245).  There is no pleural effusion.    There are no focal hepatic lesions.  Numerous gallstones are present.  Pancreas, spleen, adrenal glands, and kidneys are unremarkable.  The bowel is nondilated.  There is mild diverticulosis coli.  There has been hysterectomy.    No suspicious osseous lesions.      Impression    Large lower esophageal mass compatible with neoplasm.  Periesophageal invasion and adenopathy, involvement of the gastric cardia, and spread of disease below the diaphragm.  This includes conglomerate gastrohepatic ligament adenopathy and additional adenopathy as above.  Broad cardiac abutment is present, without definitive pericardial invasion, but difficult to exclude.    Multiple cholelithiasis.    Right lung nodule,  likely benign but attention on follow-up recommended.    This report was flagged in Epic as abnormal.      Electronically signed by: Tobias Caputo MD  Date:    01/07/2025  Time:    16:36     Results for orders placed or performed during the hospital encounter of 01/16/25 (from the past 2160 hours)   NM PET CT FDG Skull Base to Mid Thigh    Impression    Large conglomerate mass extending from the distal esophagus through the esophageal hiatus to the gastric cardia and gastroesophageal junction and with contiguous extension into the upper mid gastrohepatic region which then extends from the undersurface of the diaphragm to the pancreas consistent with primary malignancy.    Bulky metastatic lymphadenopathy retrocrural region, upper periaortic region.    Single metastatic lymph node left supraclavicular region.      Electronically signed by: Anayeli Arriaga MD  Date:    01/17/2025  Time:    07:51     No results found for this or any previous visit (from the past 2160 hours).    Pathology  Pathology Results  (Last 10 years)      None            TITLE: PLAN OF CARE FOR THE CHEMOTHERAPY PATIENT / TEACHING PROTOCOL    PURPOSE: To involve the patient / significant other in the plan of care and to provide teaching to the significant other & patient receiving chemotherapy.    LEVEL: Independent.    CONTENT: The Plan of Care for the chemotherapy patient is individualized and appropriate to the patients needs, strengths, limitations, & goals.  Education includes information regarding chemotherapy side effects, the treatment itself, and self-care  Activities.    GOAL / OUTCOME STANDARDS    PHYSIOLOGIC: The client will remain free or experience minimal side effects or toxicities throughout the chemotherapy treatment period.     PSYCHOLOGIC: The client/significant others will demonstrate positive coping mechanisms in relation to chemotherapy and its side effects.      COGINITIVE: The client/significant others will verbalize  understanding of self-care measure to avoid/minimize side effects of the chemotherapy regime.    EVALUATION / COMMENT KEY:    V = Audiovisual/Video  S = Successfully meets outcome  N = Needs further instruction  NA = Not applicable to the patient  P = Previous knowledge  U = Unable to comprehend  * = See progress notes          PLAN OF CARE  INFORMATION TO BE DELIVERED / NURSING INTERVENTIONS DATE EVALUATION   Assessment of client/caregiver,         knowledge of cancer diagnosis,         and chemotherapy as a treatment. 1a. Evaluate patient/caregiver learning ability    b. Plan teaching sessions with patient/caregiver according to needs and present anxiety level/ability to learn.    c. Provide Chemotherapy Education Packet,        Mouth Care Protocol,         Specific Patient Education Sheets. 01/31/2025 S   Individual chemotherapy treatment         plan. 2a. Review of Chemotherapy Education handout from [employer]            01/31/2025   S   Knowledge Deficit & Self-Management of general side effects common to all chemotherapy:  Nausea/Vomiting  b.   Diarrhea  Mouth Care  Dental care  Constipation  Hair Loss  Potential for infection  Fatigue   3a. Reinforce that the majority of side effects from chemotherapy are reversible and are  controlled both in the hospital and at home        (blood counts recover, hair grows back).   b.  Refer to the following for reinforcement of         information post-treatment:  Mouth Care Protocol.  Bowel Protocol for constipation or diarrhea.  3.  Drug Specific Chemotherapy Information Sheets for each medication patient receiving.    01/31/2025     S     PLAN OF CARE  INFORMATION TO BE DELIVERED / NURSING INTERVENTIONS DATE EVALUATION   h. Potential for bleeding         i. Potential anemia/fatigue         j. Potential sunburn         k. Birth control measures  l. Safety measures post treatment 4.  Chemotherapy Home Care Instruction  and Safety Information Sheet.  A.  patient/caregivers to thoroughly cook shellfish (shrimp, crab, etc) to decrease the chance of infection.    B.  Use sunscreen and protective clothing while in the sun.   01/31/2025      Knowledge deficit & Self Management of Drug Specific  Side Effects.    BLADDER EFFECTS        (Hemorrhagic Cystitis)                  Preventable with adequate hydration; occurs 2-3 days or more post treatment.   1.  Instruct patient to:  a.   Void at least every 2 hours; increase intake.  b.   DO NOT hold urine; go when urge is felt.  c.    Empty bladder at bedtime and on         awakening.  d.   Observe for color changes (red to tea           colored), amount and frequency changes.  e.   Notify oncologist of any abnormalities           in urine or voiding or if you cannot               drink adequate fluids.   01/31/2025   S   b.   CHANGES IN URINE        COLOR:      1.   Instruct patient:  a.   Most evident in first 2-3 voidings after           administration.  Lasts less than 24 hours.  If urine is discolored 2 or more days post- treatment, notify oncologist.      01/31/2025 S   c.    KIDNEY EFFECTS           (Nephrotoxicity)   1.  Instruct patient to:  a.   Drink 8-16 glasses of fluid/day the day   pre-treatment and 3-4 days post-treatment to maintain hydration; the best way to minimize kidney problems.  b.   Notify oncologist immediately if unable to drink fluids or if changes are noted in urinary elimination.     01/31/2025   S   PULMONARY TOXICITY    Instruct patient to report symptoms such as shortness of breath, chest pain, shallow breathing, or chest wall discomfort to physician.  Reinforce preventative measures used by the health care team.  Baseline and periodic PFT and chest x-ray.   01/31/2025   S     PLAN OF CARE INFORMATION TO BE DELIVERED / NURSING INTERVENTIONS DATE EVALUATION   NERVE & MUSCLE EFFECTS (neurotoxocity; neuropathy, possible visual/hearing changes)        Instruct patient to:    Report numbness or  tingling of the hands/feet, loss of fine motor movement (buttoning shirt, tying shoelaces), or gait changes to your oncologist.  If numbness/tingling are present:  protect feet with shoes at all times.  Use gloves for washing dishes/gardening & potholders in kitchen.       01/31/2025   S   CARDIOTOXICITY  Decreased effectiveness of             cardiac function. Effective are                  cumulative and irreversible.                                    CARDIAC ARRYTHMIAS              4   Instruct:  Heart function may be tested before treatment and perdiocally during treatment.  Notify oncologist of irregular pulse, palpitations, shortness of breath, or swelling in lower extremities/feet.          Taxol and Taxotere can cause arrhythmias on infusion that resolve once infusion discontinued. Instruct nurse if any irregularity felt.    01/31/2025   S   EXTRAVASTION  Occurs when vesicants leak outside of vein and cause damage to the skin and underlying tissues.   Reinforce preventive measures used to avoid complications.  Fresh IV site or central line monitored continuously with vesicant IVP.  Continuous infusion via central line site and blood return monitored periodically around the clock.  Instruct to:  Notify nurse of any discomfort, burning, stinging, etc. at IV site during chemotherapy administration.  Notify oncologist of any redness, pain, or swelling at IV site after discharge from hospital.   01/31/2025   S   HYPERSENSITIVITY can happen with any medication.   Instruct patient:  Nurse is with them during the initial part of treatment and will be close by to monitor.  Pre-medication ordered by the oncologist must be taken on time. If doses are missed, treatment will need to be re-scheduled.  Skin redness, itching, or hives appearing after discharge should be reported to oncologist. 01/31/2025   S       PLAN OF CARE INFORMATION TO BE DELIVERED / NURSING INTERVENTIONS DATE EVALUATION   FLU-LIKE SYNDROME       Instruct patient symptoms are hard to prevent and may include fever, shaking chills, muscle and body aches.  Taking prescribed medications from physician if needed.  Adequate fluids are important.    Reinforce the need to call if temperature is         elevated to 100.4 or more  01/31/2025   S   HAND-FOOT SYNDROME  causes painful, symmetric swelling and redness of palms and soles                  Instruct patient to report any numbness or tingling in the hands or feet.  Explain prevention techniques, such as     Use heavy moisturizers to lessen skin dryness and itching, but to avoid if skin is cracked or broken  Bathe in tepid water, use non-perfumed soap, and wash gently. Baths with oatmeal or diluted baking soda may be soothing.  Avoid tight fitting shoes and repetitive actions, such as rubbing hands or applying pressure to hands/feet.  Review measures to take should syndrome occur:  Cold compresses and elevation for          edema  Pain medications and other measures as ordered by oncologist.   4.   Syndrome resolves few weeks after therapy. 01/31/2025   S   5. DISCHARGE PLANNING /        EDUCATION 1.    Explain importance of compliance with follow- up  tests (CBC, CMP).  2.    Verify patient/caregiver know:  a.    Oncologists office phone number.  b.    Dates of follow-up appointments.  c.    Prescriptions given for nausea  3.   Review side effects to monitor and notify          oncologist about.  4.   Reinforce the need for patient and caregivers to:  a.    Review information given.  b.    Call oncologists office with questions          or symptoms  5.   Provide [facility] Brochure make referrals if needed for financial or .   01/31/2025   S     PROGRESS NOTES: I met with the patient  today for chemotherapy education. she will be starting treatment with FLOT every 2 weeks . We discussed the mechanism of action, potential side effects of this treatment as well as ways she can manage  them at home. Some of these side effects include but or not limited to fever, nausea, vomiting, decreased appetite, fatigue, weakness, cytopenias, myalgia/arthralgia, constipation, diarrhea, bleeding, headache, shortness of breath, nail changes, taste change, hair thinning/loss, mood disturbances, or edema. We also discussed dietary modifications she should make although this will be discussed in more detail with the dietician. she was provided with anti-emetic medication, a copy of all of the information we discussed today as well as our contact information. she will be provided a schedule on his first day of treatment. We will obtain labs on a weekly basis and the patient will follow-up with the physician for toxicity monitoring throughout treatment. All questions were answered and an informed consent was obtained. she was reminded to certainly contact us sooner if needed.  Attached to the patients folder and discussed with the patient the 24 hour/ 7 days a week after hours telephone number for the physician.  Patient notified to call anytime 24/7 because their is a physician on call for any problems that may arise.  Patient also notified to report to [facility] if they can not get in touch with a physician after hours.  Discussed the five wishes booklet with the patient and their family.           Assessment/Plan   Stage 4A esophageal cancer  -proceed with FLOT  -see MD prior to cycle 1 for clearance      Medications Ordered:  Zofran 8mg 1 tab PO Q8h prn nausea  Compazine 10 mg PO Q4-6h prn nausea  Emla cream: Apply to port site 30min prior to treatment and cover with saran wrap  Claritin 10mg PO QD day of chemotherapy and for 5 days after Neulasta to help prevent bone pain  OTC NSAIDS  Take 2 PO QD day of and for 5 days after Neulasta to help prevent bone pain  OTC Imodium as directed  OTC MiraLax 17 g daily for constipation    * please notify clinic or report to the emergency department for fever of 100.4  grade  * avoid tobacco and alcohol use  * maintain a healthy diet and good oral hydration  * good blood pressure and blood sugar control is important.  Please keep all followups with your primary care provider  *good hand hygiene  * please call clinic with any questions or concerns  * please take all medications as directed     Patient is in agreement with the proposed treatment plan. All questions were answered to the patient's satisfaction. Patient knows to call clinic for any new or worsening symptoms and if anything is needed before the next clinic visit.    [facility]  Hematology & Medical Oncology     Collaborating physician, Dr. Khoobehi.    50 minutes of total time spent on the encounter, which includes face-to-face time and non face-to-face time preparing to see the patient (e.g., review of tests), obtaining and/or reviewing separately obtain history, documenting clinical information in the electronic or other health record, independently interpreting results (not separately reported) and communicating results to the patient/family/caregiver or care coordination (not separately reported).  Electronically signed by: Se[facility] XIE

## 2025-01-31 NOTE — PROGRESS NOTES
Medical Nutrition Therapy Oncology Progress Note      Patient's PCP:Sierra, Primary Doctor  Referring Provider: Dr. Whelan  Subjective:        Patient ID: Kwasi Brown is a 59 y.o. female.    Chief Complaint: Need for nutrition support d/t new diagnosis of esophageal cancer      Past Medical History:   Diagnosis Date    Esophageal cancer 2025    Hypertension 2009    Obesity        Past Surgical History:   Procedure Laterality Date    ADENOIDECTOMY      COLONOSCOPY W/ POLYPECTOMY      Age 24 - polyps negative    HYSTERECTOMY  2009    Partial Hysterectomy    INSERTION OF TUNNELED CENTRAL VENOUS CATHETER (CVC) WITH SUBCUTANEOUS PORT Left 2025    Procedure: [id number];  Surgeon: Patrick Whelan Jr., MD;  Location: [facility] OR;  Service: General;  Laterality: Left;    INSERTION, GASTROSTOMY TUBE, LAPAROSCOPIC N/A 2025    Procedure: INSERTION, GASTROSTOMY TUBE, LAPAROSCOPIC;  Surgeon: Patrick Whelan Jr., MD;  Location: [facility] OR;  Service: General;  Laterality: N/A;    KNEE RECONSTRUCTION, MEDIAL PATELLAR FEMORAL LIGAMENT Bilateral     left ALSO RIGHT KNEE SCOPE    TONSILLECTOMY         Social History     Socioeconomic History    Marital status:    Occupational History     Employer: [employer]   Tobacco Use    Smoking status: Former     Current packs/day: 0.00     Average packs/day: 0.3 packs/day for 12.0 years (3.0 ttl pk-yrs)     Types: Cigarettes     Start date: 1971     Quit date: 1983     Years since quittin.4    Smokeless tobacco: Never   Substance and Sexual Activity    Alcohol use: Yes     Comment: occasional    Drug use: No     Social Drivers of Health     Financial Resource Strain: Low Risk  (2025)    Overall Financial Resource Strain (CARDIA)     Difficulty of Paying Living Expenses: Not hard at all   Food Insecurity: No Food Insecurity (2025)    Hunger Vital Sign     Worried About Running Out of Food in  the Last Year: Never true     Ran Out of Food in the Last Year: Never true   Physical Activity: Inactive (1/13/2025)    Exercise Vital Sign     Days of Exercise per Week: 0 days     Minutes of Exercise per Session: 10 min   Stress: No Stress Concern Present (1/13/2025)    [place] [employer] - Occupational Stress Questionnaire     Feeling of Stress : Only a little   Housing Stability: Unknown (1/13/2025)    Housing Stability Vital Sign     Unable to Pay for Housing in the Last Year: No       Family History   Problem Relation Name Age of Onset    Tuberculosis Mother      COPD Mother      Heart disease Mother  70    Hypertension Mother      Hypertension Father      Heart disease Father  78    Diabetes Father      Diabetes Brother  1        type 1    Heart disease Brother      Hypertension Brother      Coronary artery disease Unknown      Hypertension Unknown      Diabetes Unknown         Review of patient's allergies indicates:  No Known Allergies    Current Outpatient Medications:     ALPRAZolam (XANAX) 0.5 MG tablet, Take 1 tablet (0.5 mg total) by mouth 3 (three) times daily as needed for Insomnia or Anxiety., Disp: 30 tablet, Rfl: 0    benazepril-hydrochlorthiazide (LOTENSIN HCT) 20-12.5 mg per tablet, Take 1 tablet by mouth once daily., Disp: 90 tablet, Rfl: 3    cetirizine (ZYRTEC) 10 MG tablet, Take 10 mg by mouth once daily., Disp: , Rfl:     dexAMETHasone (DECADRON) 4 MG Tab, Take take 2 tablets (8 mg) by mouth twice daily the day before chemotherapy and the day following chemotherapy, then take 2 tablets (8 mg) by mouth on day 3 of each chemotherapy cycle., Disp: 30 tablet, Rfl: 3    fluticasone propionate (FLONASE) 50 mcg/actuation nasal spray, 1 spray by Each Nostril route once daily., Disp: , Rfl:     hydrocodone-acetaminophen (HYCET) solution 7.5-325 mg/15mL, Take 15 mLs by mouth every 4 to 6 hours as needed for Pain., Disp: 750 mL, Rfl: 0    LIDOcaine-prilocaine (EMLA) cream, Apply  topically as needed (Apply to port site 30 minutes before access as needed)., Disp: 30 g, Rfl: 0    lysine 1,000 mg Tab, Take 1 tablet by mouth Daily., Disp: , Rfl:     multivitamin capsule, Take 1 capsule by mouth once daily., Disp: , Rfl:     naproxen sodium (ANAPROX) 220 MG tablet, Take 220 mg by mouth every 12 (twelve) hours., Disp: , Rfl:     omega 3-dha-epa-fish oil 60- mg CpDR, Take 1 capsule by mouth Daily., Disp: , Rfl:     ondansetron (ZOFRAN) 8 MG tablet, Take 1 tablet (8 mg total) by mouth every 12 (twelve) hours as needed for Nausea., Disp: 30 tablet, Rfl: 2    ondansetron (ZOFRAN-ODT) 8 MG TbDL, Take 1 tablet (8 mg total) by mouth every 8 (eight) hours as needed (nausea/vomiting)., Disp: 60 tablet, Rfl: 5    promethazine (PHENERGAN) 25 MG tablet, Take 1 tablet (25 mg total) by mouth every 6 (six) hours as needed for Nausea., Disp: 30 tablet, Rfl: 1    scopolamine (TRANSDERM-SCOP) 1.3-1.5 mg (1 mg over 3 days), Place 1 patch onto the skin every 72 hours., Disp: 10 patch, Rfl: 1    All medications and past history have been reviewed.    OP FLOT - FLUOROURACIL LEUCOVORIN OXALIPLATIN DOCETAXEL Q2W    Treatment Goal:   Curative    Status:   Active    Start Date:   1/15/2025 (Planned)    End Date:   4/24/2025 (Planned)    Provider:   Aurash Khoobehi, MD    Chemotherapy:   DOCEtaxel (TAXOTERE) in 0.9% NaCl 250 mL chemo infusion, 50 mg/m2, Intravenous, Clinic/HOD 1 time, 0 of 8 cycles    oxaliplatin (ELOXATIN) in D5W 500 mL chemo infusion, 85 mg/m2, Intravenous, Clinic/HOD 1 time, 0 of 8 cycles      Objective:      Wt Readings from Last 10 Encounters:   01/31/25 96.7 kg (213 lb 3 oz)   01/30/25 95 kg (209 lb 7 oz)   01/24/25 95.8 kg (211 lb 3.2 oz)   01/13/25 98.2 kg (216 lb 6.4 oz)   09/13/24 106.6 kg (235 lb)   10/26/22 108 kg (238 lb)     Last Labs:  Last Labs:  Glucose   Date Value Ref Range Status   01/28/2025 110 70 - 110 mg/dL Final   09/13/2024 112 (H) 70 - 110 mg/dL Final     BUN   Date Value  "Ref Range Status   01/28/2025 22 8 - 23 mg/dL Final   09/13/2024 13 8 - 23 mg/dL Final     Creatinine   Date Value Ref Range Status   01/28/2025 0.5 0.5 - 1.4 mg/dL Final   09/13/2024 0.6 0.5 - 1.4 mg/dL Final     Sodium   Date Value Ref Range Status   01/28/2025 136 136 - 145 mmol/L Final   09/13/2024 141 136 - 145 mmol/L Final     Potassium   Date Value Ref Range Status   01/28/2025 4.6 3.5 - 5.1 mmol/L Final   09/13/2024 3.8 3.5 - 5.1 mmol/L Final     No results found for: "PHOS"  Calcium   Date Value Ref Range Status   01/28/2025 9.4 8.7 - 10.5 mg/dL Final   09/13/2024 9.0 8.7 - 10.5 mg/dL Final     No results found for: "PREALBUMIN"  Total Protein   Date Value Ref Range Status   01/28/2025 7.1 6.0 - 8.4 g/dL Final   09/13/2024 7.5 6.0 - 8.4 g/dL Final     Cholesterol   Date Value Ref Range Status   09/13/2024 208 (H) 120 - 199 mg/dL Final     Comment:     The National Cholesterol Education Program (NCEP) has set the  following guidelines (reference ranges) for Cholesterol:  Optimal.....................<200 mg/dL  Borderline High.............200-239 mg/dL  High........................> or = 240 mg/dL     10/26/2022 219 (H) 120 - 199 mg/dL Final     Comment:     The National Cholesterol Education Program (NCEP) has set the  following guidelines (reference ranges) for Cholesterol:  Optimal.....................<200 mg/dL  Borderline High.............200-239 mg/dL  High........................> or = 240 mg/dL       No results found for: "HGBA1C"  Hemoglobin   Date Value Ref Range Status   01/28/2025 10.2 (L) 12.0 - 16.0 g/dL Final   09/13/2024 13.8 12.0 - 16.0 g/dL Final     Hematocrit   Date Value Ref Range Status   01/28/2025 32.5 (L) 37.0 - 48.5 % Final   09/13/2024 43.2 37.0 - 48.5 % Final     No results found for: "IRON"  No components found for: "FROLATE"  No results found for: "[id number]"  WBC   Date Value Ref Range Status   01/28/2025 9.23 3.90 - 12.70 K/uL Final   09/13/2024 6.18 3.90 - 12.70 K/uL Final "       Assessment:     Nutrition/Diet History     Patient Reported Diet/Restrictions/Preferences: Only able to tolerate pureed foods and liquids  Food Allergies: none  Factors Affecting Nutritional Intake: swallowing difficulty r/t esophageal cancer    Estimated/Assessed Needs     Weight Used For Calorie Calculations: 97 kg (213 lb)  Energy Calorie Requirements (kcal): 4111-6909 kcal/day   Energy Need Method: 25-30 Kcal/kg  Protein Requirements:  g/day   Protein Need Method: 1-1.5 g/kg  Fluid Requirements: 2425 ml/day  Estimated Fluid Requirement Method: 1ml/kcal      Nutrition Support  EN recommendations if TF is required to meet 100% of estimated nutritional needs: Jevity 1.5, 7 cartons per day (1659mL total). Bolus 1 carton (237ml) 7x/day with 60ml FWF before and after each bolus. TF and FWF provides 2489 calories, 106g protein and 2101ml FW. Recommend extra 400ml FWF daily meet estimated fluid needs.      Evaluation of Received Nutrient/Fluid Intake     Calorie Intake: not meeting needs  Protein Intake: not meeting needs  Fluid Intake: not meeting needs  Tolerance: tolerating  % Intake of Estimated Energy Needs: 25-50 %     Nutrition Diagnosis Related to (Etiology) As Evidenced By (Signs/Symptoms)   Inadequate energy intake Swallowing difficulty caused by esophageal carcinoma  Only able to consume pureed foods and liquids, weight loss of >30# in 3 months     RD Notes  Mrs. Kwasi Estrada had PEG tube placed yesterday d/t new diagnosis of esophageal carcinoma and inability to meet calorie/protein needs by mouth. She makes soups and is drink 1-2 protein shakes daily. She is drinking 2 bottles of water daily. Noted that her taste is off and that she is feeling nauseated. Patient reports initial weight at home was 242# and today was 206# at home. RD provided sample of plant based Ensure, Ensure coupons and [employer] to take by mouth until tube feeding is set up. CW: 213#       Nutrition Intervention:      Nutrition  Intervention Texture-modified diet, Energy-modified diet, and Protein-modified diet   Goals/Expected Outcomes Continue mechanical soft/pureed high calorie, high protein food choices to meet estimated nutritional needs   Progress Initial      Nutrition Intervention Enteral Nutrition  Volume   Goals/Expected Outcomes Initiate EN as po intake declines   Progress Initial      Plan  Reviewed chemo school packet & provided copy to pt.   Discussed importance of maintaining wt & staying hydrated.   Reviewed food safety guidelines recommended during treatment.   Discussed alternate sources of hydration.  Referral to [facility] for peg supplies and formula placed.  Provided RD contact info & encouraged pt to call with any questions/concerns.   Will f/u as needed.     Monitoring/Evaluation:      Monitor: po intake, weight, BM, tube feeding tolerance     Next Visit: f/u weekly or as needed during therapy    I have explained and the patient understands all of  the current recommendation(s). I have answered all of their questions to the best of my ability and to their complete satisfaction.   The patient is to continue with the current management plan.    Electronically signed by: Blanche Linares, MS, RDN, LDN, CDCES

## 2025-02-03 DIAGNOSIS — C15.5 MALIGNANT NEOPLASM OF ABDOMINAL ESOPHAGUS: Primary | ICD-10-CM

## 2025-02-06 ENCOUNTER — HOSPITAL ENCOUNTER (OUTPATIENT)
Dept: RADIOLOGY | Facility: HOSPITAL | Age: 60
Discharge: HOME | End: 2025-02-06
Attending: SURGERY
Payer: COMMERCIAL

## 2025-02-06 ENCOUNTER — TELEPHONE (OUTPATIENT)
Dept: SURGERY | Facility: CLINIC | Age: 60
End: 2025-02-06
Payer: COMMERCIAL

## 2025-02-06 ENCOUNTER — TELEPHONE (OUTPATIENT)
Dept: HEMATOLOGY/ONCOLOGY | Facility: CLINIC | Age: 60
End: 2025-02-06
Payer: COMMERCIAL

## 2025-02-06 DIAGNOSIS — R10.9 ABDOMINAL PAIN, UNSPECIFIED ABDOMINAL LOCATION: ICD-10-CM

## 2025-02-06 DIAGNOSIS — R10.9 ABDOMINAL PAIN, UNSPECIFIED ABDOMINAL LOCATION: Primary | ICD-10-CM

## 2025-02-06 PROCEDURE — 74177 CT ABD & PELVIS W/CONTRAST: CPT | Mod: 26,,, | Performed by: RADIOLOGY

## 2025-02-06 PROCEDURE — 74177 CT ABD & PELVIS W/CONTRAST: CPT | Mod: TC

## 2025-02-06 PROCEDURE — 25500020 PHARM REV CODE 255: Performed by: SURGERY

## 2025-02-06 RX ADMIN — IOHEXOL 100 ML: 350 INJECTION, SOLUTION INTRAVENOUS at 03:02

## 2025-02-06 NOTE — TELEPHONE ENCOUNTER
Spoke with pt to inquire if her reported sx of diarrhea and low grade temp have improved. Pt states that diarrhea/constipation has resolved, but she has a low grade temp that began Monday. This morning it is 99 per pt. Pt reports an extremely tender abdomen - had peg tube placed on 1/30. Pt taking hycet for pain. Advised pt to contact surgeon for instruction.

## 2025-02-06 NOTE — TELEPHONE ENCOUNTER
----- Message from Eric sent at 2/6/2025 10:28 AM CST -----  Type:  Needs Medical Advice    Who Called: pt     Symptoms (please be specific): post-op symptoms     How long has patient had these symptoms:  about a week     Pharmacy name and phone #:    [id number] - [place], [place] - [address] AT [facility]  [address]  [place] [address]  Phone: [phone] Fax: [phone]        Would the patient rather a call back or a response via [facility]? Call back     Best Call Back Number: [phone]     Additional Information: pt says it's been a week since surgery and she is still experiencing pain and has a slight fever,  please call to advise, Thank You.

## 2025-02-06 NOTE — TELEPHONE ENCOUNTER
Attempted to reach patient, left message    Per Dr Whelan via secure chat:  ok tell her not to use the g-tube and I will order labs and a CT scan that she can come get today. Tell her to stick with clear diet

## 2025-02-07 ENCOUNTER — TELEPHONE (OUTPATIENT)
Facility: CLINIC | Age: 60
End: 2025-02-07
Payer: COMMERCIAL

## 2025-02-07 DIAGNOSIS — C15.5 MALIGNANT NEOPLASM OF ABDOMINAL ESOPHAGUS: Primary | ICD-10-CM

## 2025-02-07 LAB
ONEOME COMMENT: NORMAL
ONEOME METHOD: NORMAL

## 2025-02-07 RX ORDER — LORAZEPAM 0.5 MG/1
0.5 TABLET ORAL EVERY 6 HOURS PRN
Qty: 120 TABLET | Refills: 0 | Status: SHIPPED | OUTPATIENT
Start: 2025-02-07 | End: 2025-03-09

## 2025-02-11 ENCOUNTER — TREATMENT (OUTPATIENT)
Dept: RADIATION ONCOLOGY | Facility: CLINIC | Age: 60
End: 2025-02-11
Payer: COMMERCIAL

## 2025-02-14 ENCOUNTER — OFFICE VISIT (OUTPATIENT)
Dept: SURGERY | Facility: CLINIC | Age: 60
End: 2025-02-14
Payer: COMMERCIAL

## 2025-02-14 VITALS — SYSTOLIC BLOOD PRESSURE: 128 MMHG | TEMPERATURE: 98 F | HEART RATE: 96 BPM | DIASTOLIC BLOOD PRESSURE: 96 MMHG

## 2025-02-14 DIAGNOSIS — C15.5 MALIGNANT NEOPLASM OF ABDOMINAL ESOPHAGUS: Primary | ICD-10-CM

## 2025-02-14 RX ORDER — ALBUTEROL SULFATE 90 UG/1
INHALANT RESPIRATORY (INHALATION)
COMMUNITY

## 2025-02-14 RX ORDER — AZELASTINE 1 MG/ML
SPRAY, METERED NASAL
COMMUNITY
Start: 2024-09-23

## 2025-02-21 ENCOUNTER — OFFICE VISIT (OUTPATIENT)
Dept: HEMATOLOGY/ONCOLOGY | Facility: CLINIC | Age: 60
End: 2025-02-21
Payer: COMMERCIAL

## 2025-02-21 ENCOUNTER — LAB VISIT (OUTPATIENT)
Dept: LAB | Facility: HOSPITAL | Age: 60
End: 2025-02-21
Attending: INTERNAL MEDICINE
Payer: COMMERCIAL

## 2025-02-21 VITALS
SYSTOLIC BLOOD PRESSURE: 111 MMHG | RESPIRATION RATE: 18 BRPM | TEMPERATURE: 98 F | HEART RATE: 92 BPM | HEIGHT: 67 IN | WEIGHT: 196 LBS | OXYGEN SATURATION: 100 % | BODY MASS INDEX: 30.76 KG/M2 | DIASTOLIC BLOOD PRESSURE: 64 MMHG

## 2025-02-21 DIAGNOSIS — C15.5 MALIGNANT NEOPLASM OF ABDOMINAL ESOPHAGUS: ICD-10-CM

## 2025-02-21 DIAGNOSIS — F41.9 ANXIETY: ICD-10-CM

## 2025-02-21 DIAGNOSIS — C15.5 MALIGNANT NEOPLASM OF ABDOMINAL ESOPHAGUS: Primary | ICD-10-CM

## 2025-02-21 DIAGNOSIS — R11.0 CHEMOTHERAPY-INDUCED NAUSEA: ICD-10-CM

## 2025-02-21 DIAGNOSIS — T45.1X5A CHEMOTHERAPY-INDUCED NAUSEA: ICD-10-CM

## 2025-02-21 LAB
ALBUMIN SERPL BCP-MCNC: 3.2 G/DL (ref 3.5–5.2)
ALP SERPL-CCNC: 86 U/L (ref 55–135)
ALT SERPL W/O P-5'-P-CCNC: 22 U/L (ref 10–44)
ANION GAP SERPL CALC-SCNC: 7 MMOL/L (ref 8–16)
AST SERPL-CCNC: 17 U/L (ref 10–40)
BASOPHILS # BLD AUTO: 0.02 K/UL (ref 0–0.2)
BASOPHILS NFR BLD: 0.2 % (ref 0–1.9)
BILIRUB SERPL-MCNC: 0.3 MG/DL (ref 0.1–1)
BUN SERPL-MCNC: 15 MG/DL (ref 8–23)
CALCIUM SERPL-MCNC: 8.6 MG/DL (ref 8.7–10.5)
CHLORIDE SERPL-SCNC: 92 MMOL/L (ref 95–110)
CO2 SERPL-SCNC: 34 MMOL/L (ref 23–29)
CREAT SERPL-MCNC: 0.5 MG/DL (ref 0.5–1.4)
DIFFERENTIAL METHOD BLD: ABNORMAL
EOSINOPHIL # BLD AUTO: 0 K/UL (ref 0–0.5)
EOSINOPHIL NFR BLD: 0.5 % (ref 0–8)
ERYTHROCYTE [DISTWIDTH] IN BLOOD BY AUTOMATED COUNT: 14.8 % (ref 11.5–14.5)
EST. GFR  (NO RACE VARIABLE): >60 ML/MIN/1.73 M^2
GLUCOSE SERPL-MCNC: 153 MG/DL (ref 70–110)
HCT VFR BLD AUTO: 33.3 % (ref 37–48.5)
HGB BLD-MCNC: 10.5 G/DL (ref 12–16)
IMM GRANULOCYTES # BLD AUTO: 0.06 K/UL (ref 0–0.04)
IMM GRANULOCYTES NFR BLD AUTO: 0.7 % (ref 0–0.5)
LYMPHOCYTES # BLD AUTO: 0.3 K/UL (ref 1–4.8)
LYMPHOCYTES NFR BLD: 4.1 % (ref 18–48)
MAGNESIUM SERPL-MCNC: 2 MG/DL (ref 1.6–2.6)
MCH RBC QN AUTO: 26.9 PG (ref 27–31)
MCHC RBC AUTO-ENTMCNC: 31.5 G/DL (ref 32–36)
MCV RBC AUTO: 85 FL (ref 82–98)
MONOCYTES # BLD AUTO: 0.8 K/UL (ref 0.3–1)
MONOCYTES NFR BLD: 10 % (ref 4–15)
NEUTROPHILS # BLD AUTO: 6.8 K/UL (ref 1.8–7.7)
NEUTROPHILS NFR BLD: 84.5 % (ref 38–73)
NRBC BLD-RTO: 0 /100 WBC
PLATELET # BLD AUTO: 266 K/UL (ref 150–450)
PMV BLD AUTO: 8.9 FL (ref 9.2–12.9)
POTASSIUM SERPL-SCNC: 3.7 MMOL/L (ref 3.5–5.1)
PROT SERPL-MCNC: 6.3 G/DL (ref 6–8.4)
RBC # BLD AUTO: 3.91 M/UL (ref 4–5.4)
SODIUM SERPL-SCNC: 133 MMOL/L (ref 136–145)
WBC # BLD AUTO: 8.07 K/UL (ref 3.9–12.7)

## 2025-02-21 PROCEDURE — 83735 ASSAY OF MAGNESIUM: CPT | Performed by: INTERNAL MEDICINE

## 2025-02-21 PROCEDURE — 80053 COMPREHEN METABOLIC PANEL: CPT | Performed by: INTERNAL MEDICINE

## 2025-02-21 PROCEDURE — 36415 COLL VENOUS BLD VENIPUNCTURE: CPT | Performed by: INTERNAL MEDICINE

## 2025-02-21 PROCEDURE — 85025 COMPLETE CBC W/AUTO DIFF WBC: CPT | Performed by: INTERNAL MEDICINE

## 2025-02-21 RX ORDER — SODIUM CHLORIDE 0.9 % (FLUSH) 0.9 %
10 SYRINGE (ML) INJECTION
OUTPATIENT
Start: 2025-02-24

## 2025-02-21 RX ORDER — EPINEPHRINE 0.3 MG/.3ML
0.3 INJECTION SUBCUTANEOUS ONCE AS NEEDED
OUTPATIENT
Start: 2025-02-24

## 2025-02-21 RX ORDER — SODIUM CHLORIDE 0.9 % (FLUSH) 0.9 %
10 SYRINGE (ML) INJECTION
OUTPATIENT
Start: 2025-02-25

## 2025-02-21 RX ORDER — HEPARIN 100 UNIT/ML
500 SYRINGE INTRAVENOUS
OUTPATIENT
Start: 2025-02-25

## 2025-02-21 RX ORDER — DIPHENHYDRAMINE HYDROCHLORIDE 50 MG/ML
50 INJECTION INTRAMUSCULAR; INTRAVENOUS ONCE AS NEEDED
OUTPATIENT
Start: 2025-02-24

## 2025-02-21 RX ORDER — HEPARIN 100 UNIT/ML
500 SYRINGE INTRAVENOUS
OUTPATIENT
Start: 2025-02-24

## 2025-02-21 RX ORDER — OLANZAPINE 5 MG/1
TABLET ORAL
Qty: 30 TABLET | Refills: 2 | Status: SHIPPED | OUTPATIENT
Start: 2025-02-21

## 2025-02-21 NOTE — PROGRESS NOTES
Service Date:  2/21/25    Chief Complaint:  Esophageal carcinoma     Kwasi Brown is a 59 y.o. female here with its esophageal carcinoma.      Completed radiation therapy.  States it has helped her with her back pain.  It has also helped her with swallowing.  She seems to be in better spirits as she now has some relief.  She also has a PEG tube now in place.  She has no complaints me.  A bit anxious about chemo otherwise doing well.  Her  was present this time.    Review of Systems   Constitutional:  Positive for appetite change. Negative for unexpected weight change.   HENT: Negative.  Negative for mouth sores.    Eyes: Negative.  Negative for visual disturbance.   Respiratory:  Positive for cough. Negative for shortness of breath.    Cardiovascular: Negative.  Negative for chest pain.   Gastrointestinal:  Positive for diarrhea. Negative for abdominal pain.   Endocrine: Negative.    Genitourinary: Negative.  Negative for frequency.   Musculoskeletal:  Positive for back pain.   Integumentary:  Negative for rash. Negative.   Neurological: Negative.  Negative for headaches.   Hematological: Negative.  Negative for adenopathy.   Psychiatric/Behavioral: Negative.  The patient is not nervous/anxious.         Current Outpatient Medications   Medication Instructions    albuterol (PROVENTIL/VENTOLIN HFA) 90 mcg/actuation inhaler INHALE 2 PUFFS EVERY 4 HOURS BY INHALATION ROUTE.    albuterol sulfate (PROAIR DIGIHALER) 90 mcg/actuation aebs Inhale 2 puffs every 4 hours by inhalation route.    azelastine (ASTELIN) 137 mcg (0.1 %) nasal spray SPRAY 2 SPRAYS BY INTRANASAL ROUTE TWICE A DAY    benazepril-hydrochlorthiazide (LOTENSIN HCT) 20-12.5 mg per tablet 1 tablet, Oral, Daily    cetirizine (ZYRTEC) 10 mg, Daily    dexAMETHasone (DECADRON) 4 MG Tab Take take 2 tablets (8 mg) by mouth twice daily the day before chemotherapy and the day following chemotherapy, then take 2 tablets (8 mg) by mouth on day 3 of each  chemotherapy cycle.    fluticasone propionate (FLONASE) 50 mcg/actuation nasal spray 1 spray, Daily    hydrocodone-acetaminophen (HYCET) solution 7.5-325 mg/15mL 15 mLs, Oral, Every 4-6 hours PRN    LIDOcaine-prilocaine (EMLA) cream Topical (Top), As needed (PRN)    LORazepam (ATIVAN) 0.5 mg, Oral, Every 6 hours PRN    lysine 1,000 mg Tab 1 tablet, Daily    multivitamin capsule 1 capsule, Daily    OLANZapine (ZYPREXA) 5 MG tablet Take 1 tablet the night of chemotherapy and for the following 3 nights.    omega 3-dha-epa-fish oil 60- mg CpDR 1 capsule, Daily    ondansetron (ZOFRAN) 8 mg, Oral, Every 12 hours PRN    ondansetron (ZOFRAN-ODT) 8 mg, Oral, Every 8 hours PRN    promethazine (PHENERGAN) 25 mg, Oral, Every 6 hours PRN    scopolamine (TRANSDERM-SCOP) 1.3-1.5 mg (1 mg over 3 days) 1 patch, Transdermal, Every 72 hours        Past Medical History:   Diagnosis Date    Esophageal cancer 01/04/2025    Hypertension 2009    Obesity         Past Surgical History:   Procedure Laterality Date    ADENOIDECTOMY      COLONOSCOPY W/ POLYPECTOMY  1989    Age 24 - polyps negative    HYSTERECTOMY  2009    Partial Hysterectomy    INSERTION OF TUNNELED CENTRAL VENOUS CATHETER (CVC) WITH SUBCUTANEOUS PORT Left 1/30/2025    Procedure: [id number];  Surgeon: Patrick Whelan Jr., MD;  Location: [facility] OR;  Service: General;  Laterality: Left;    INSERTION, GASTROSTOMY TUBE, LAPAROSCOPIC N/A 1/30/2025    Procedure: INSERTION, GASTROSTOMY TUBE, LAPAROSCOPIC;  Surgeon: Patrick Whelan Jr., MD;  Location: [facility] OR;  Service: General;  Laterality: N/A;    KNEE RECONSTRUCTION, MEDIAL PATELLAR FEMORAL LIGAMENT Bilateral     left ALSO RIGHT KNEE SCOPE    TONSILLECTOMY          Family History   Problem Relation Name Age of Onset    Tuberculosis Mother      COPD Mother      Heart disease Mother  70    Hypertension Mother      Hypertension Father      Heart disease Father  78    Diabetes Father      Diabetes Brother  1  "       type 1    Heart disease Brother      Hypertension Brother      Coronary artery disease Unknown      Hypertension Unknown      Diabetes Unknown         Social History     Tobacco Use    Smoking status: Former     Current packs/day: 0.00     Average packs/day: 0.3 packs/day for 12.0 years (3.0 ttl pk-yrs)     Types: Cigarettes     Start date: 1971     Quit date: 1983     Years since quittin.4    Smokeless tobacco: Never   Substance Use Topics    Alcohol use: Yes     Comment: occasional    Drug use: No         Vitals:    25 1012   BP: 111/64   Pulse: 92   Resp: 18   Temp: 98.2 °F (36.8 °C)        Physical Exam:  /64 (BP Location: Left arm, Patient Position: Sitting)   Pulse 92   Temp 98.2 °F (36.8 °C) (Temporal)   Resp 18   Ht 5' 7" (1.702 m)   Wt 88.9 kg (195 lb 15.8 oz)   SpO2 100%   BMI 30.70 kg/m²     Physical Exam  Constitutional:       Appearance: Normal appearance.   HENT:      Head: Normocephalic and atraumatic.      Nose: Nose normal.      Mouth/Throat:      Mouth: Mucous membranes are moist.      Pharynx: Oropharynx is clear.   Eyes:      Conjunctiva/sclera: Conjunctivae normal.   Cardiovascular:      Rate and Rhythm: Normal rate and regular rhythm.      Heart sounds: Normal heart sounds.   Pulmonary:      Effort: Pulmonary effort is normal.      Breath sounds: Normal breath sounds.   Abdominal:      General: Abdomen is flat. Bowel sounds are normal.      Palpations: Abdomen is soft.   Musculoskeletal:         General: Normal range of motion.      Cervical back: Normal range of motion and neck supple.   Skin:     General: Skin is warm and dry.   Neurological:      General: No focal deficit present.      Mental Status: She is alert and oriented to person, place, and time. Mental status is at baseline.   Psychiatric:         Mood and Affect: Mood normal.          Labs:  Lab Results   Component Value Date    WBC 8.07 2025    RBC 3.91 (L) 2025    HGB 10.5 (L) " 02/21/2025    HCT 33.3 (L) 02/21/2025    MCV 85 02/21/2025    MCH 26.9 (L) 02/21/2025    MCHC 31.5 (L) 02/21/2025    RDW 14.8 (H) 02/21/2025     02/21/2025    MPV 8.9 (L) 02/21/2025    GRAN 6.8 02/21/2025    GRAN 84.5 (H) 02/21/2025    LYMPH 0.3 (L) 02/21/2025    LYMPH 4.1 (L) 02/21/2025    MONO 0.8 02/21/2025    MONO 10.0 02/21/2025    EOS 0.0 02/21/2025    BASO 0.02 02/21/2025    EOSINOPHIL 0.5 02/21/2025    BASOPHIL 0.2 02/21/2025     Sodium   Date Value Ref Range Status   02/21/2025 133 (L) 136 - 145 mmol/L Final     Potassium   Date Value Ref Range Status   02/21/2025 3.7 3.5 - 5.1 mmol/L Final     Chloride   Date Value Ref Range Status   02/21/2025 92 (L) 95 - 110 mmol/L Final     CO2   Date Value Ref Range Status   02/21/2025 34 (H) 23 - 29 mmol/L Final     Glucose   Date Value Ref Range Status   02/21/2025 153 (H) 70 - 110 mg/dL Final     BUN   Date Value Ref Range Status   02/21/2025 15 8 - 23 mg/dL Final     Creatinine   Date Value Ref Range Status   02/21/2025 0.5 0.5 - 1.4 mg/dL Final     Calcium   Date Value Ref Range Status   02/21/2025 8.6 (L) 8.7 - 10.5 mg/dL Final     Total Protein   Date Value Ref Range Status   02/21/2025 6.3 6.0 - 8.4 g/dL Final     Albumin   Date Value Ref Range Status   02/21/2025 3.2 (L) 3.5 - 5.2 g/dL Final     Total Bilirubin   Date Value Ref Range Status   02/21/2025 0.3 0.1 - 1.0 mg/dL Final     Comment:     For infants and newborns, interpretation of results should be based  on gestational age, weight and in agreement with clinical  observations.    Premature Infant recommended reference ranges:  Up to 24 hours.............<8.0 mg/dL  Up to 48 hours............<12.0 mg/dL  3-5 days..................<15.0 mg/dL  6-29 days.................<15.0 mg/dL       Alkaline Phosphatase   Date Value Ref Range Status   02/21/2025 86 55 - 135 U/L Final     AST   Date Value Ref Range Status   02/21/2025 17 10 - 40 U/L Final     ALT   Date Value Ref Range Status   02/21/2025 22 10  - 44 U/L Final     Anion Gap   Date Value Ref Range Status   02/21/2025 7 (L) 8 - 16 mmol/L Final     eGFR if    Date Value Ref Range Status   02/23/2022 >60.0 >60 mL/min/1.73 m^2 Final     eGFR if non    Date Value Ref Range Status   02/23/2022 >60.0 >60 mL/min/1.73 m^2 Final     Comment:     Calculation used to obtain the estimated glomerular filtration  rate (eGFR) is the CKD-EPI equation.          A/P:    Poorly differentiated carcinoma of the esophagus  -PET scan unfortunately reveals a supraclavicular lymph node that is positive with a SUV of 9  -recommend palliative radiation to start with transition to chemotherapy after   -completed palliative radiation   -okay to proceed with chemotherapy with FLOT.  Patient does have a CPS score of 1.  However on [facility] testing, it did not recommend nivolumab.  I will be in contact with them to try to learn why and to added on later.  For now will continue with FLOT.  -RTC in 2 weeks for cycle 2.  In anticipation of nausea I did give her a prescription for Zyprexa to take along with the Decadron that she is going to take around the chemotherapy.      Aurash Khoobehi, MD  Hematology and Oncology    Visit today included increased complexity associated with the care of the episodic problem esophageal cancer addressed and managing the longitudinal care of the patient due to the serious and/or complex managed problem(s).

## 2025-02-21 NOTE — PROGRESS NOTES
GENERAL SURGERY  POST-OP PROGRESS NOTE    HPI: Kwasi Brown is a 59 y.o. female status post Port-A-Cath insertion and laparoscopic G-tube placement. At the time of surgery there was initially some concern for injury to the back wall of the stomach however Gastrografin study failed to show any contrast extravasation in the tube appeared to be in appropriate position. She later developed pain and mild fevers. I ordered CT abdomen pelvis which again showed no significant abnormality in the tube in appropriate position.  She is here today for follow-up. Fevers have resolved. Still with some abdominal pain but this has may be related to her gastric cancer.    VITALS:  Vitals:    02/14/25 0914   BP: (!) 128/96   Pulse: 96   Temp: 97.7 °F (36.5 °C)       PHYSICAL EXAM:  Port in place without signs of infection or breakdown  G-tube in place without signs of infection    Imaging:  CT abdomen pelvis 02/06/2025  FINDINGS:  CT ABDOMEN: The lung bases are clear, with no pleural effusion.  Heterogeneously enhancing mass arising from the distal esophagus and involving the gastroesophageal junction is similar to previous PET CT.  This is contiguous with confluent upper abdominal lymphadenopathy, encasing the upper abdominal vasculature, with several enlarged pre aortic and para-aortic retroperitoneal lymph nodes, all similar to prior PET CT.     There is a percutaneous gastrostomy tube with distal tip and retention balloon within the gastric lumen, with localized fat stranding reflecting edema in the abdominal wall about the gastrostomy tube.  No extraluminal fluid collections or free air, or rim enhancing abdominal wall fluid collections.     There are multiple calcified gallstones, with the liver enhancing normally.  No biliary ductal dilatation.  The spleen is normal in size and enhances normally, with the pancreas, adrenal glands and kidneys enhancing normally.  No hydronephrosis.  The abdominal aorta, iliac arteries, and  remaining abdominal vasculature enhance normally.     Enteric contrast material reaches the distal small bowel, with no bowel wall thickening, inflammation, or bowel obstruction.  There are scattered colon diverticula, with no ascites.     CT PELVIS: The rectum, vaginal cuff, urinary bladder and pelvic vasculature enhance normally.  There is no pelvic free fluid, with no enlarged pelvic or inguinal lymph nodes.     The extraperitoneal soft tissues enhance normally.  There are no acute fractures or destructive osseous lesions.  There is intervertebral disc space narrowing and facet arthropathy in the spine.     Impression:     1. Percutaneous gastrostomy tube with distal tip and retention balloon in the gastric lumen, and no complicating features.  2. Additional observations as described    ASSESSMENT & PLAN:  59 y.o. female s/p port and G tube placement  -no obvious signs gastric injury or tube dislodgement or leak  -okay to use G-tube is necessary  -okay to use port as necessary  -return to clinic as needed

## 2025-02-24 ENCOUNTER — DOCUMENTATION ONLY (OUTPATIENT)
Dept: NUTRITION | Facility: HOSPITAL | Age: 60
End: 2025-02-24

## 2025-02-24 ENCOUNTER — INFUSION (OUTPATIENT)
Dept: INFUSION THERAPY | Facility: HOSPITAL | Age: 60
End: 2025-02-24
Attending: INTERNAL MEDICINE
Payer: COMMERCIAL

## 2025-02-24 VITALS
SYSTOLIC BLOOD PRESSURE: 136 MMHG | HEIGHT: 67 IN | DIASTOLIC BLOOD PRESSURE: 87 MMHG | RESPIRATION RATE: 18 BRPM | BODY MASS INDEX: 30.26 KG/M2 | OXYGEN SATURATION: 99 % | TEMPERATURE: 97 F | HEART RATE: 76 BPM | WEIGHT: 192.81 LBS

## 2025-02-24 DIAGNOSIS — C15.5 MALIGNANT NEOPLASM OF ABDOMINAL ESOPHAGUS: Primary | ICD-10-CM

## 2025-02-24 PROCEDURE — 96368 THER/DIAG CONCURRENT INF: CPT

## 2025-02-24 PROCEDURE — 96413 CHEMO IV INFUSION 1 HR: CPT

## 2025-02-24 PROCEDURE — 96417 CHEMO IV INFUS EACH ADDL SEQ: CPT

## 2025-02-24 PROCEDURE — 96415 CHEMO IV INFUSION ADDL HR: CPT

## 2025-02-24 PROCEDURE — 25000003 PHARM REV CODE 250: Performed by: INTERNAL MEDICINE

## 2025-02-24 PROCEDURE — 63600175 PHARM REV CODE 636 W HCPCS: Performed by: INTERNAL MEDICINE

## 2025-02-24 PROCEDURE — 96367 TX/PROPH/DG ADDL SEQ IV INF: CPT

## 2025-02-24 PROCEDURE — 96416 CHEMO PROLONG INFUSE W/PUMP: CPT

## 2025-02-24 RX ORDER — EPINEPHRINE 0.3 MG/.3ML
0.3 INJECTION SUBCUTANEOUS ONCE AS NEEDED
Status: DISCONTINUED | OUTPATIENT
Start: 2025-02-24 | End: 2025-02-24 | Stop reason: HOSPADM

## 2025-02-24 RX ORDER — DIPHENHYDRAMINE HYDROCHLORIDE 50 MG/ML
50 INJECTION INTRAMUSCULAR; INTRAVENOUS ONCE AS NEEDED
Status: DISCONTINUED | OUTPATIENT
Start: 2025-02-24 | End: 2025-02-24 | Stop reason: HOSPADM

## 2025-02-24 RX ORDER — SODIUM CHLORIDE 0.9 % (FLUSH) 0.9 %
10 SYRINGE (ML) INJECTION
Status: DISCONTINUED | OUTPATIENT
Start: 2025-02-24 | End: 2025-02-24 | Stop reason: HOSPADM

## 2025-02-24 RX ADMIN — OXALIPLATIN 173 MG: 5 INJECTION, SOLUTION INTRAVENOUS at 10:02

## 2025-02-24 RX ADMIN — SODIUM CHLORIDE 0.25 MG: 9 INJECTION, SOLUTION INTRAVENOUS at 08:02

## 2025-02-24 RX ADMIN — DEXTROSE MONOHYDRATE: 50 INJECTION, SOLUTION INTRAVENOUS at 10:02

## 2025-02-24 RX ADMIN — FLUOROURACIL 5280 MG: 50 INJECTION, SOLUTION INTRAVENOUS at 12:02

## 2025-02-24 RX ADMIN — SODIUM CHLORIDE: 900 INJECTION, SOLUTION INTRAVENOUS at 08:02

## 2025-02-24 RX ADMIN — DEXTROSE MONOHYDRATE 405 MG: 50 INJECTION, SOLUTION INTRAVENOUS at 10:02

## 2025-02-24 RX ADMIN — DOCETAXEL ANHYDROUS 102 MG: 10 INJECTION, SOLUTION INTRAVENOUS at 09:02

## 2025-02-24 NOTE — PLAN OF CARE
Problem: Fatigue  Goal: Improved Activity Tolerance  Intervention: Promote Improved Energy  Flowsheets (Taken 2/24/2025 0401)  Fatigue Management: frequent rest breaks encouraged  Activity Management: Ambulated -L4

## 2025-02-24 NOTE — PROGRESS NOTES
Medical Nutrition Therapy Oncology Progress Note      Patient's PCP:Sierra, Primary Doctor  Referring Provider: Dr. Whelan  Subjective:        Patient ID: Kwasi Brown is a 59 y.o. female.    Chief Complaint: Need for nutrition support d/t new diagnosis of esophageal cancer      Past Medical History:   Diagnosis Date    Esophageal cancer 2025    Hypertension 2009    Obesity        Past Surgical History:   Procedure Laterality Date    ADENOIDECTOMY      COLONOSCOPY W/ POLYPECTOMY      Age 24 - polyps negative    HYSTERECTOMY  2009    Partial Hysterectomy    INSERTION OF TUNNELED CENTRAL VENOUS CATHETER (CVC) WITH SUBCUTANEOUS PORT Left 2025    Procedure: [id number];  Surgeon: Patrick Whelan Jr., MD;  Location: [facility] OR;  Service: General;  Laterality: Left;    INSERTION, GASTROSTOMY TUBE, LAPAROSCOPIC N/A 2025    Procedure: INSERTION, GASTROSTOMY TUBE, LAPAROSCOPIC;  Surgeon: Patrick Whelan Jr., MD;  Location: [facility] OR;  Service: General;  Laterality: N/A;    KNEE RECONSTRUCTION, MEDIAL PATELLAR FEMORAL LIGAMENT Bilateral     left ALSO RIGHT KNEE SCOPE    TONSILLECTOMY         Social History     Socioeconomic History    Marital status:    Occupational History     Employer: [employer]   Tobacco Use    Smoking status: Former     Current packs/day: 0.00     Average packs/day: 0.3 packs/day for 12.0 years (3.0 ttl pk-yrs)     Types: Cigarettes     Start date: 1971     Quit date: 1983     Years since quittin.4    Smokeless tobacco: Never   Substance and Sexual Activity    Alcohol use: Yes     Comment: occasional    Drug use: No     Social Drivers of Health     Financial Resource Strain: Low Risk  (2025)    Overall Financial Resource Strain (CARDIA)     Difficulty of Paying Living Expenses: Not hard at all   Food Insecurity: No Food Insecurity (2025)    Hunger Vital Sign     Worried About Running Out of Food in  the Last Year: Never true     Ran Out of Food in the Last Year: Never true   Transportation Needs: No Transportation Needs (2/19/2025)    PRAPARE - Transportation     Lack of Transportation (Medical): No     Lack of Transportation (Non-Medical): No   Physical Activity: Inactive (2/19/2025)    Exercise Vital Sign     Days of Exercise per Week: 0 days     Minutes of Exercise per Session: 0 min   Stress: No Stress Concern Present (2/19/2025)    [employer] [place] [employer] - Occupational Stress Questionnaire     Feeling of Stress : Not at all   Housing Stability: Low Risk  (2/19/2025)    Housing Stability Vital Sign     Unable to Pay for Housing in the Last Year: No     Homeless in the Last Year: No       Family History   Problem Relation Name Age of Onset    Tuberculosis Mother      COPD Mother      Heart disease Mother  70    Hypertension Mother      Hypertension Father      Heart disease Father  78    Diabetes Father      Diabetes Brother  1        type 1    Heart disease Brother      Hypertension Brother      Coronary artery disease Unknown      Hypertension Unknown      Diabetes Unknown         Review of patient's allergies indicates:  No Known Allergies    Current Outpatient Medications:     albuterol (PROVENTIL/VENTOLIN HFA) 90 mcg/actuation inhaler, INHALE 2 PUFFS EVERY 4 HOURS BY INHALATION ROUTE., Disp: , Rfl:     albuterol sulfate (PROAIR DIGIHALER) 90 mcg/actuation aebs, Inhale 2 puffs every 4 hours by inhalation route., Disp: , Rfl:     azelastine (ASTELIN) 137 mcg (0.1 %) nasal spray, SPRAY 2 SPRAYS BY INTRANASAL ROUTE TWICE A DAY, Disp: , Rfl:     benazepril-hydrochlorthiazide (LOTENSIN HCT) 20-12.5 mg per tablet, Take 1 tablet by mouth once daily., Disp: 90 tablet, Rfl: 3    cetirizine (ZYRTEC) 10 MG tablet, Take 10 mg by mouth once daily., Disp: , Rfl:     dexAMETHasone (DECADRON) 4 MG Tab, Take take 2 tablets (8 mg) by mouth twice daily the day before chemotherapy and the day following  chemotherapy, then take 2 tablets (8 mg) by mouth on day 3 of each chemotherapy cycle., Disp: 30 tablet, Rfl: 3    fluticasone propionate (FLONASE) 50 mcg/actuation nasal spray, 1 spray by Each Nostril route once daily., Disp: , Rfl:     hydrocodone-acetaminophen (HYCET) solution 7.5-325 mg/15mL, Take 15 mLs by mouth every 4 to 6 hours as needed for Pain., Disp: 750 mL, Rfl: 0    LIDOcaine-prilocaine (EMLA) cream, Apply topically as needed (Apply to port site 30 minutes before access as needed)., Disp: 30 g, Rfl: 0    LORazepam (ATIVAN) 0.5 MG tablet, Take 1 tablet (0.5 mg total) by mouth every 6 (six) hours as needed for Anxiety (Nausea and vomiting)., Disp: 120 tablet, Rfl: 0    lysine 1,000 mg Tab, Take 1 tablet by mouth Daily., Disp: , Rfl:     multivitamin capsule, Take 1 capsule by mouth once daily., Disp: , Rfl:     OLANZapine (ZYPREXA) 5 MG tablet, Take 1 tablet the night of chemotherapy and for the following 3 nights., Disp: 30 tablet, Rfl: 2    omega 3-dha-epa-fish oil 60- mg CpDR, Take 1 capsule by mouth Daily., Disp: , Rfl:     ondansetron (ZOFRAN) 8 MG tablet, Take 1 tablet (8 mg total) by mouth every 12 (twelve) hours as needed for Nausea., Disp: 30 tablet, Rfl: 2    ondansetron (ZOFRAN-ODT) 8 MG TbDL, Take 1 tablet (8 mg total) by mouth every 8 (eight) hours as needed (nausea/vomiting)., Disp: 60 tablet, Rfl: 5    promethazine (PHENERGAN) 25 MG tablet, Take 1 tablet (25 mg total) by mouth every 6 (six) hours as needed for Nausea., Disp: 30 tablet, Rfl: 1    scopolamine (TRANSDERM-SCOP) 1.3-1.5 mg (1 mg over 3 days), Place 1 patch onto the skin every 72 hours., Disp: 10 patch, Rfl: 1  No current facility-administered medications for this visit.    Facility-Administered Medications Ordered in Other Visits:     diphenhydrAMINE injection 50 mg, 50 mg, Intravenous, Once PRN, Khoobehi, Aurash, MD    EPINEPHrine (EPIPEN) 0.3 mg/0.3 mL pen injection 0.3 mg, 0.3 mg, Intramuscular, Once PRN, Khoobehi,  MD Thomas    fluorouracil (Adrucil) 2,600 mg/m2 = 5,280 mg in 0.9% NaCl 250 mL chemo infusion, 2,600 mg/m2, Intravenous, over 24 hr, Khoobehi, Aurash, MD    hydrocortisone sodium succinate injection 100 mg, 100 mg, Intravenous, Once PRN, Khoobehi, Aurash, MD    leucovorin calcium 200 mg/m2 = 405 mg in D5W 270.25 mL infusion, 200 mg/m2, Intravenous, 1 time in [facility], Khoobehi, Aurash, MD, Last Rate: 135.1 mL/hr at 02/24/25 1032, 405 mg at 02/24/25 1032    oxaliplatin (ELOXATIN) 85 mg/m2 = 173 mg in D5W 599.6 mL chemo infusion, 85 mg/m2, Intravenous, 1 time in [facility], Khoobehi, Aurash, MD, Last Rate: 300 mL/hr at 02/24/25 1032, Rate Verify at 02/24/25 1032    sodium chloride 0.9% flush 10 mL, 10 mL, Intravenous, PRN, Khoobehi, Aurash, MD    All medications and past history have been reviewed.    OP FLOT - FLUOROURACIL LEUCOVORIN OXALIPLATIN DOCETAXEL Q2W    Treatment Goal:   Curative    Status:   Active    Start Date:   1/15/2025 (Planned)    End Date:   4/24/2025 (Planned)    Provider:   Aurash Khoobehi, MD    Chemotherapy:   DOCEtaxel (TAXOTERE) in 0.9% NaCl 250 mL chemo infusion, 50 mg/m2, Intravenous, Clinic/[facility] 1 time, 0 of 8 cycles    oxaliplatin (ELOXATIN) in D5W 500 mL chemo infusion, 85 mg/m2, Intravenous, Clinic/[facility] 1 time, 0 of 8 cycles      Objective:      Wt Readings from Last 10 Encounters:   01/31/25 96.7 kg (213 lb 3 oz)   01/30/25 95 kg (209 lb 7 oz)   01/24/25 95.8 kg (211 lb 3.2 oz)   01/13/25 98.2 kg (216 lb 6.4 oz)   09/13/24 106.6 kg (235 lb)   10/26/22 108 kg (238 lb)     Last Labs:  Last Labs:  Glucose   Date Value Ref Range Status   02/21/2025 153 (H) 70 - 110 mg/dL Final   01/28/2025 110 70 - 110 mg/dL Final     BUN   Date Value Ref Range Status   02/21/2025 15 8 - 23 mg/dL Final   01/28/2025 22 8 - 23 mg/dL Final     Creatinine   Date Value Ref Range Status   02/21/2025 0.5 0.5 - 1.4 mg/dL Final   01/28/2025 0.5 0.5 - 1.4 mg/dL Final     Sodium   Date Value Ref Range Status  "  02/21/2025 133 (L) 136 - 145 mmol/L Final   01/28/2025 136 136 - 145 mmol/L Final     Potassium   Date Value Ref Range Status   02/21/2025 3.7 3.5 - 5.1 mmol/L Final   01/28/2025 4.6 3.5 - 5.1 mmol/L Final     No results found for: "PHOS"  Calcium   Date Value Ref Range Status   02/21/2025 8.6 (L) 8.7 - 10.5 mg/dL Final   01/28/2025 9.4 8.7 - 10.5 mg/dL Final     No results found for: "PREALBUMIN"  Total Protein   Date Value Ref Range Status   02/21/2025 6.3 6.0 - 8.4 g/dL Final   01/28/2025 7.1 6.0 - 8.4 g/dL Final     Cholesterol   Date Value Ref Range Status   09/13/2024 208 (H) 120 - 199 mg/dL Final     Comment:     The National Cholesterol Education Program (NCEP) has set the  following guidelines (reference ranges) for Cholesterol:  Optimal.....................<200 mg/dL  Borderline High.............200-239 mg/dL  High........................> or = 240 mg/dL     10/26/2022 219 (H) 120 - 199 mg/dL Final     Comment:     The National Cholesterol Education Program (NCEP) has set the  following guidelines (reference ranges) for Cholesterol:  Optimal.....................<200 mg/dL  Borderline High.............200-239 mg/dL  High........................> or = 240 mg/dL       No results found for: "HGBA1C"  Hemoglobin   Date Value Ref Range Status   02/21/2025 10.5 (L) 12.0 - 16.0 g/dL Final   01/28/2025 10.2 (L) 12.0 - 16.0 g/dL Final     Hematocrit   Date Value Ref Range Status   02/21/2025 33.3 (L) 37.0 - 48.5 % Final   01/28/2025 32.5 (L) 37.0 - 48.5 % Final     No results found for: "IRON"  No components found for: "FROLATE"  No results found for: "[id number]"  WBC   Date Value Ref Range Status   02/21/2025 8.07 3.90 - 12.70 K/uL Final   01/28/2025 9.23 3.90 - 12.70 K/uL Final       Assessment:     Nutrition/Diet History     Patient Reported Diet/Restrictions/Preferences: Only able to tolerate pureed foods and liquids  Food Allergies: none  Factors Affecting Nutritional Intake: swallowing difficulty r/t " esophageal cancer    Estimated/Assessed Needs     Weight Used For Calorie Calculations: 97 kg (213 lb)  Energy Calorie Requirements (kcal): 9085-2441 kcal/day   Energy Need Method: 25-30 Kcal/kg  Protein Requirements:  g/day   Protein Need Method: 1-1.5 g/kg  Fluid Requirements: 2425 ml/day  Estimated Fluid Requirement Method: 1ml/kcal      Nutrition Support  EN recommendations if TF is required to meet 100% of estimated nutritional needs: Jevity 1.5, 7 cartons per day (1659mL total). Bolus 1 carton (237ml) 7x/day with 60ml FWF before and after each bolus. TF and FWF provides 2489 calories, 106g protein and 2101ml FW. Recommend extra 400ml FWF daily meet estimated fluid needs.      Evaluation of Received Nutrient/Fluid Intake     Calorie Intake: not meeting needs  Protein Intake: not meeting needs  Fluid Intake: not meeting needs  Tolerance: tolerating  % Intake of Estimated Energy Needs: 25-50 %     Nutrition Diagnosis Related to (Etiology) As Evidenced By (Signs/Symptoms)   Inadequate energy intake Swallowing difficulty caused by esophageal carcinoma  Only able to consume pureed foods and liquids, weight loss of >30# in 3 months     RD Notes  Mrs. Kwasi Estrada had PEG tube placed yesterday d/t new diagnosis of esophageal carcinoma and inability to meet calorie/protein needs by mouth. She makes soups and is drink 1-2 protein shakes daily. She is drinking 2 bottles of water daily. Noted that her taste is off and that she is feeling nauseated. Patient reports initial weight at home was 242# and today was 206# at home. RD provided sample of plant based Ensure, Ensure coupons and Aardvark to take by mouth until tube feeding is set up. CW: 213#      2/24/25: Patient reports that she has been able to eat more solids since her taste has returned and has stopped using her PEG tube. Noted that tube feeding formula was causing GI issues, both Jevity 1.5 and Yola trakkies [employer] Standard 1.4 but most GI issues are resolved, except  that she is still struggling with diarrhea. RD provided handout on diarrhea, samples for Ensure Clear since patient noted that she cannot tolerate dairy products, sample of Yola UB. Peptide 1.5 and Biolyte. We also discussed importance of maintaining current weight, patient voiced understanding. CW: 192#     Nutrition Intervention:      Nutrition Intervention Texture-modified diet, Energy-modified diet, and Protein-modified diet   Goals/Expected Outcomes Continue mechanical soft/pureed high calorie, high protein food choices to meet estimated nutritional needs   Progress Progressing      Nutrition Intervention Enteral Nutrition  Volume   Goals/Expected Outcomes Continue EN as po intake declines   Progress Progressing      Plan  Encouraged intake of high calorie and protein foods every 2-3 hours.   Provided samples of Ensure Clear and [employer] Peptide 1.5 to try.   Encouraged pt to call with any questions/concerns.   Will f/u as needed.     Monitoring/Evaluation:      Monitor: po intake, weight, BM, tube feeding tolerance     Next Visit: f/u weekly or as needed during therapy    I have explained and the patient understands all of  the current recommendation(s). I have answered all of their questions to the best of my ability and to their complete satisfaction.   The patient is to continue with the current management plan.    Electronically signed by: Blanche Linares, MS, RDN, LDN, CDCES

## 2025-02-25 ENCOUNTER — TELEPHONE (OUTPATIENT)
Facility: CLINIC | Age: 60
End: 2025-02-25
Payer: COMMERCIAL

## 2025-02-25 ENCOUNTER — INFUSION (OUTPATIENT)
Dept: INFUSION THERAPY | Facility: HOSPITAL | Age: 60
End: 2025-02-25
Attending: INTERNAL MEDICINE
Payer: COMMERCIAL

## 2025-02-25 VITALS
DIASTOLIC BLOOD PRESSURE: 72 MMHG | SYSTOLIC BLOOD PRESSURE: 130 MMHG | BODY MASS INDEX: 30.64 KG/M2 | RESPIRATION RATE: 18 BRPM | HEART RATE: 80 BPM | TEMPERATURE: 97 F | HEIGHT: 67 IN | OXYGEN SATURATION: 100 % | WEIGHT: 195.19 LBS

## 2025-02-25 DIAGNOSIS — E86.0 DEHYDRATION: Primary | ICD-10-CM

## 2025-02-25 DIAGNOSIS — C15.5 MALIGNANT NEOPLASM OF ABDOMINAL ESOPHAGUS: ICD-10-CM

## 2025-02-25 PROCEDURE — 25000003 PHARM REV CODE 250: Performed by: NURSE PRACTITIONER

## 2025-02-25 PROCEDURE — 63600175 PHARM REV CODE 636 W HCPCS: Performed by: INTERNAL MEDICINE

## 2025-02-25 PROCEDURE — 96361 HYDRATE IV INFUSION ADD-ON: CPT

## 2025-02-25 PROCEDURE — 96365 THER/PROPH/DIAG IV INF INIT: CPT

## 2025-02-25 PROCEDURE — 25000003 PHARM REV CODE 250: Performed by: INTERNAL MEDICINE

## 2025-02-25 RX ORDER — SODIUM CHLORIDE 0.9 % (FLUSH) 0.9 %
10 SYRINGE (ML) INJECTION
Status: DISCONTINUED | OUTPATIENT
Start: 2025-02-25 | End: 2025-02-25 | Stop reason: HOSPADM

## 2025-02-25 RX ORDER — SODIUM CHLORIDE 0.9 % (FLUSH) 0.9 %
10 SYRINGE (ML) INJECTION
Status: CANCELLED | OUTPATIENT
Start: 2025-02-25

## 2025-02-25 RX ORDER — ONDANSETRON HCL IN 0.9 % NACL 8 MG/50 ML
8 INTRAVENOUS SOLUTION, PIGGYBACK (ML) INTRAVENOUS
Status: COMPLETED | OUTPATIENT
Start: 2025-02-25 | End: 2025-02-25

## 2025-02-25 RX ORDER — HEPARIN 100 UNIT/ML
500 SYRINGE INTRAVENOUS
Status: DISCONTINUED | OUTPATIENT
Start: 2025-02-25 | End: 2025-02-25 | Stop reason: HOSPADM

## 2025-02-25 RX ORDER — HEPARIN 100 UNIT/ML
500 SYRINGE INTRAVENOUS
Status: CANCELLED | OUTPATIENT
Start: 2025-02-25

## 2025-02-25 RX ADMIN — SODIUM CHLORIDE 8 MG: 9 INJECTION, SOLUTION INTRAVENOUS at 01:02

## 2025-02-25 RX ADMIN — SODIUM CHLORIDE 1000 ML: 9 INJECTION, SOLUTION INTRAVENOUS at 12:02

## 2025-02-25 RX ADMIN — HEPARIN 500 UNITS: 100 SYRINGE at 02:02

## 2025-02-25 NOTE — NURSING
Received call from patient. Stating she is feeling really bad and does not think she is getting enough fluids in. Also stated she received messaged from insurance company that [employer] was denied. Reviewed this with providers.  Seble Moody NP to put in orders for IV fluids. Reached out to [employer] representative Kwasi Pedraza about processing appeal. Patient to be given IV fluids at pump d/c appointment. Reviewed this with patient.

## 2025-02-25 NOTE — PLAN OF CARE
Problem: Fatigue  Goal: Improved Activity Tolerance  2/25/2025 1245 by Nanci Estevez, RN  Outcome: Met  2/25/2025 1245 by Nanci Estevez, RN  Outcome: Progressing

## 2025-02-27 DIAGNOSIS — K22.89 ESOPHAGEAL MASS: ICD-10-CM

## 2025-02-27 RX ORDER — HYDROCODONE BITARTRATE AND ACETAMINOPHEN 7.5; 325 MG/15ML; MG/15ML
15 SOLUTION ORAL
Qty: 750 ML | Refills: 0 | Status: SHIPPED | OUTPATIENT
Start: 2025-02-27 | End: 2025-02-28

## 2025-02-27 NOTE — TELEPHONE ENCOUNTER
"----- Message from Naomi sent at 2/27/2025  8:51 AM CST -----  Pt requests call back from RN. Received chemo treatments on 2/24 and 2/25. Since her treatment, Pt has started coughing and last night it kept her up all night w/ a "blackish color" to the mucus she spit up. No fever.CB: [phone]  "

## 2025-02-27 NOTE — TELEPHONE ENCOUNTER
"Incoming call from pt. She states that she has also been having a headache and feels like her "glands inside of her ears hurt." She reports that she did take ibuprofen yesterday, and it did help. Pt advised that she may take hycet as well.  "

## 2025-02-27 NOTE — TELEPHONE ENCOUNTER
Pt reports that this past Sunday, 2/23 she had acid reflux, for which she usually takes pepcid PRN, but she did not. She states that she has been having nausea and now vomiting which she believes is related to the peg tube/tube feeds. Pt reports emesis is black in color and last episode was yesterday morning after tube feeding. She states that the nausea and vomiting have decreased since eliminating the milk based protein shakes and eating more solids recently. She reports no difficulty with swallowing and has been taking prescribed antiemetics PRN. Pt's last chemo tx was Mon, 2/24, and she received IVF on 2/25. She reports that she now still feels weak and has low grade temps only in the evenings of approximately 100.3-100.4. Pt states that she has had a cough recently, that was much worse last night. Nurse noted that cough is wet sounding. Pt states that cough is productive, with brownish-black mucous. Pt requesting refill of pain medication which has been routed to provider.

## 2025-02-28 ENCOUNTER — PATIENT MESSAGE (OUTPATIENT)
Dept: HEMATOLOGY/ONCOLOGY | Facility: CLINIC | Age: 60
End: 2025-02-28
Payer: COMMERCIAL

## 2025-02-28 ENCOUNTER — TELEPHONE (OUTPATIENT)
Dept: HEMATOLOGY/ONCOLOGY | Facility: CLINIC | Age: 60
End: 2025-02-28
Payer: COMMERCIAL

## 2025-02-28 DIAGNOSIS — G89.3 NEOPLASM RELATED PAIN: Primary | ICD-10-CM

## 2025-02-28 RX ORDER — HYDROCODONE BITARTRATE AND ACETAMINOPHEN 10; 325 MG/1; MG/1
1 TABLET ORAL EVERY 6 HOURS PRN
Qty: 60 TABLET | Refills: 0 | Status: SHIPPED | OUTPATIENT
Start: 2025-02-28

## 2025-02-28 NOTE — TELEPHONE ENCOUNTER
----- Message from Kassie sent at 2/28/2025 10:29 AM CST -----  The refill for the Pt's pain medication, have been denied and insurance said she can only order that medication once. Pharmacist, said it may need to be changed to something else. Are there coupons that the doctor can give the patient for the prescription? The pt is in the middle of treatment. #   [phone]

## 2025-03-06 ENCOUNTER — OFFICE VISIT (OUTPATIENT)
Dept: PSYCHIATRY | Facility: CLINIC | Age: 60
End: 2025-03-06
Payer: COMMERCIAL

## 2025-03-06 DIAGNOSIS — K22.89 ESOPHAGEAL MASS: ICD-10-CM

## 2025-03-06 DIAGNOSIS — F43.20 ADJUSTMENT DISORDER, UNSPECIFIED TYPE: Primary | ICD-10-CM

## 2025-03-06 PROCEDURE — 4010F ACE/ARB THERAPY RXD/TAKEN: CPT | Mod: CPTII,S$GLB,, | Performed by: COUNSELOR

## 2025-03-06 PROCEDURE — 90791 PSYCH DIAGNOSTIC EVALUATION: CPT | Mod: S$GLB,,, | Performed by: COUNSELOR

## 2025-03-06 PROCEDURE — 99999 PR PBB SHADOW E&M-EST. PATIENT-LVL II: CPT | Mod: PBBFAC,,, | Performed by: COUNSELOR

## 2025-03-06 NOTE — PROGRESS NOTES
PSYCHO-ONCOLOGY INTAKE    Diagnostic Interview - CPT 17832    Date: 3/6/2025  Site: [facility]    Evaluation Length (direct face-to-face time):  1 hour    This includes face to face time and non-face to face time preparing to see the patient, obtaining and/or reviewing separately obtained history, documenting clinical information in the electronic or other health record, independently interpreting results and communicating results to the patient/family/caregiver, or care coordinator.     Referral Source: Bipin Banks III, *   Oncologist:   PCP: No, Primary Doctor    Clinical status of patient: Outpatient    Kwasi Brown, a 59 y.o. female, seen for initial evaluation visit.    Kwasi Brown reviewed and agreed to informed consent and the limits of confidentiality.    Chief complaint/reason for encounter: adjustment to illness    History of Present Illness:     Medical/Surgical History:    Patient Active Problem List   Diagnosis    Hypertension    Morbid obesity    LBBB (left bundle branch block)    ARREGUIN (dyspnea on exertion)    Essential hypertension    Dyslipidemia    Esophageal mass    Malignant neoplasm of abdominal esophagus    Dehydration    Hypokalemia       Health Behaviors:       ETOH Use: Denied       Tobacco Use: Denied    Illicit Drug Use:  Denied      Prescription Misuse: Denied    Caffeine: minimal   Exercise:The patient is actively working to return to baseline exercise tolerance.   Firearms:  Declined to answer    Advanced directives: Endorsed: needing to update them     Family History:   Psychiatric illness: Denied     Alcohol/Drug Abuse: Denied       Past Psychiatric History:   Inpatient treatment: Denied     Outpatient treatment: Denied     Prior substance abuse treatment: Denied     Suicide Attempts: Denied      Psychotropic Medications: Medical marijuana for cancer pain and appetite; not taking Ativan        Past: none    Current medications as per below, allergies reviewed in  chart.    Current Outpatient Medications   Medication    albuterol (PROVENTIL/VENTOLIN HFA) 90 mcg/actuation inhaler    albuterol sulfate (PROAIR DIGIHALER) 90 mcg/actuation aebs    azelastine (ASTELIN) 137 mcg (0.1 %) nasal spray    benazepril-hydrochlorthiazide (LOTENSIN HCT) 20-12.5 mg per tablet    cetirizine (ZYRTEC) 10 MG tablet    dexAMETHasone (DECADRON) 4 MG Tab    famotidine (PEPCID) 40 MG tablet    fluticasone propionate (FLONASE) 50 mcg/actuation nasal spray    HYDROcodone-acetaminophen (NORCO)  mg per tablet    LIDOcaine-prilocaine (EMLA) cream    LORazepam (ATIVAN) 0.5 MG tablet    lysine 1,000 mg Tab    multivitamin capsule    OLANZapine (ZYPREXA) 5 MG tablet    omega 3-dha-epa-fish oil 60- mg CpDR    ondansetron (ZOFRAN) 8 MG tablet    ondansetron (ZOFRAN-ODT) 8 MG TbDL    pantoprazole (PROTONIX) 20 MG tablet    promethazine (PHENERGAN) 25 MG tablet    scopolamine (TRANSDERM-SCOP) 1.3-1.5 mg (1 mg over 3 days)     No current facility-administered medications for this visit.     Facility-Administered Medications Ordered in Other Visits   Medication Frequency    0.9% NaCl 100 mL flush bag 1 time in Clinic/HOD    diphenhydrAMINE injection 50 mg Once PRN    EPINEPHrine (EPIPEN) 0.3 mg/0.3 mL pen injection 0.3 mg Once PRN    fluorouracil (Adrucil) 2,600 mg/m2 = 5,280 mg in 0.9% NaCl 250 mL chemo infusion over 24 hr    hydrocortisone sodium succinate injection 100 mg Once PRN    sodium chloride 0.9% flush 10 mL PRN        Social situation/Stressors: Kwasi Brown lives with her  in [place], [place].  She is a full-time . She has been in her job for 30 years.    Kwasi Brown has been  37 years and has 3 adult children (son, daughter, and step-son).  Her partner is Tobias.   The patient reports excellent social support.  Kwasi Brown is an active member of the [employer].  Kwasi Brown's hobbies include spending time with friends and  family.    Strengths:Steady employment, financial stability, Housing stability, Able to vocalize needs, Setting and pursuing goals, hopes, dreams, aspirations, Resources - social, interpersonal, monetary, and Interpersonal relationships and supports available - family, relatives, friends  Liabilities: Complicated medical illness    Current Evaluation:     Mental Status Exam: Kwasi Brown arrived promptly for the assessment session. Her friend was also present during the interview, with the consent of Kwasi Brown.  The patient was fully cooperative throughout the interview and was an adequate historian   Appearance: age appropriate, appropriately  dressed, adequately  groomed  Behavior/Cooperation: friendly and cooperative  Speech: normal in rate, volume, and tone and appropriate quality, quantity and organization of sentences  Mood: steady  Affect: mood congruent and appropriate  Thought Process: goal-directed, logical  Thought Content: normal, no suicidality, no homicidality, delusions, or paranoia;did not appear to be responding to internal stimuli during the interview.   Orientation: grossly intact  Memory: grossly intact  Attention Span/Concentration: Attends to interview without distraction; reports no difficulty  Fund of Knowledge: average  Estimate of Intelligence: average from verbal skills and history  Cognition: grossly intact  Insight: patient has awareness of illness; good insight into own behavior and behavior of others  Judgment: the patient's behavior is adequate to circumstances      Adjustment Assessment to Current Illness:    Oncology History   Malignant neoplasm of abdominal esophagus   1/8/2025 Cancer Staged    Staging form: Esophagus - Adenocarcinoma, AJCC 8th Edition  - Clinical stage from 1/8/2025: Stage HUYEN (cT4a, cN2, cM0, G3)     1/15/2025 Initial Diagnosis    Malignant neoplasm of abdominal esophagus     2/24/2025 -  Chemotherapy    Treatment Summary   Plan Name: OP S[facility] -  FLUOROURACIL LEUCOVORIN OXALIPLATIN DOCETAXEL Q2W  Treatment Goal: Curative  Status: Active  Start Date: 2/24/2025  End Date: 6/3/2025 (Planned)  Provider: Aurash Khoobehi, MD  Chemotherapy: DOCEtaxel (TAXOTERE) 50 mg/m2 = 102 mg in 0.9% NaCl 295.2 mL chemo infusion, 50 mg/m2 = 102 mg, Intravenous, Clinic/HOD 1 time, 2 of 8 cycles  Administration: 102 mg (2/24/2025), 102 mg (3/10/2025)  oxaliplatin (ELOXATIN) 85 mg/m2 = 173 mg in D5W 599.6 mL chemo infusion, 85 mg/m2 = 173 mg, Intravenous, Clinic/HOD 1 time, 2 of 8 cycles  Administration: 173 mg (2/24/2025), 173 mg (3/10/2025)  fluorouracil (Adrucil) 2,600 mg/m2 = 5,280 mg in 0.9% NaCl 250 mL chemo infusion, 2,600 mg/m2 = 5,280 mg, Intravenous, Over 24 hours, 2 of 8 cycles  Administration: 5,280 mg (2/24/2025), 5,280 mg (3/10/2025)           Kwasi Brown has adjusted to illness fairly well primarily through active coping strategies, focus on alternative activities, focus on work, and focus on family. She has engaged in appropriate information gathering.  The patient has excellent family/friend support.  Her support system is coping adequately with the diagnosis/treatment/prognosis. Illness-related psychosocial stressors include absence from work, difficulty meeting family responsibilities, and changes in ability to engage in leisure activities.  The patient has a good partnership with her [facility] treatment team. The patient reports the following barriers to cancer care:distance from the hospital.     NCCN Distress thermometer:       3/7/2025    10:27 AM 3/7/2025     9:21 AM 2/21/2025    10:19 AM 1/23/2025     5:15 PM 1/22/2025     4:29 PM 1/13/2025    11:52 AM   DISTRESS SCREENING   Distress Score 0 - No Distress 3 3 7 7 6   Practical Concerns None of these None of these None of these Treatment decisions  Treatment decisions  Treatment decisions    Social Concerns None of these None of these None of these None of these None of these None of  these   Emotional Concerns None of these None of these Worry or anxiety Worry or anxiety Worry or anxiety None of these   Spiritual or Druze Concerns None of these None of these None of these None of these None of these None of these   Physical Concerns None of these Sleep;Fatigue Pain;Changes in eating None of these None of these Pain       Data saved with a previous flowsheet row definition        Symptoms:   Mood: fatigue, guilt, poor appetite, sleep variability, no prior; no SI/HI  Anxiety: denied; no prior  Substance abuse: denied  Cognitive functioning: denied  Health behaviors:  No compensatory behaviors noted       Assessment - Diagnosis - Goals:       ICD-10-CM ICD-9-CM   1. Adjustment disorder, unspecified type  F43.20 309.9   2. Esophageal mass  K22.89 530.89       Plan: Follow up as needed.     Summary and Recommendations  Kwasi Brown is a 59 y.o. female referred by Bipin Banks III, * for psychological evaluation and treatment.  Ms. Brown appears to be coping fairly well with her diagnosis and proposed treatment course. She indicated intermittent periods of tearfulness as she processes and adjusts to her diagnosis and prognosis and has been utilizing active coping strategies to manage emotional distress. Pt is in agreement to follow up as needed to assess emotional adjustment and provide support as treatment progresses. Pt will contact the office if symptoms increase or if coping becomes more difficult..     Return to clinic: as needed    Nga Jiménez Psy.D.   Clinical Health Psychology Fellow

## 2025-03-07 ENCOUNTER — OFFICE VISIT (OUTPATIENT)
Dept: HEMATOLOGY/ONCOLOGY | Facility: CLINIC | Age: 60
End: 2025-03-07
Payer: COMMERCIAL

## 2025-03-07 ENCOUNTER — LAB VISIT (OUTPATIENT)
Dept: LAB | Facility: HOSPITAL | Age: 60
End: 2025-03-07
Attending: INTERNAL MEDICINE
Payer: COMMERCIAL

## 2025-03-07 ENCOUNTER — RESULTS FOLLOW-UP (OUTPATIENT)
Dept: HEMATOLOGY/ONCOLOGY | Facility: CLINIC | Age: 60
End: 2025-03-07

## 2025-03-07 ENCOUNTER — INFUSION (OUTPATIENT)
Dept: INFUSION THERAPY | Facility: HOSPITAL | Age: 60
End: 2025-03-07
Attending: INTERNAL MEDICINE
Payer: COMMERCIAL

## 2025-03-07 ENCOUNTER — DOCUMENTATION ONLY (OUTPATIENT)
Dept: HEMATOLOGY/ONCOLOGY | Facility: CLINIC | Age: 60
End: 2025-03-07
Payer: COMMERCIAL

## 2025-03-07 VITALS
BODY MASS INDEX: 30.63 KG/M2 | DIASTOLIC BLOOD PRESSURE: 75 MMHG | OXYGEN SATURATION: 100 % | SYSTOLIC BLOOD PRESSURE: 109 MMHG | TEMPERATURE: 99 F | WEIGHT: 195.13 LBS | RESPIRATION RATE: 18 BRPM | HEART RATE: 88 BPM | HEIGHT: 67 IN

## 2025-03-07 VITALS
DIASTOLIC BLOOD PRESSURE: 70 MMHG | TEMPERATURE: 98 F | HEIGHT: 67 IN | SYSTOLIC BLOOD PRESSURE: 121 MMHG | OXYGEN SATURATION: 100 % | WEIGHT: 193.13 LBS | BODY MASS INDEX: 30.31 KG/M2 | RESPIRATION RATE: 18 BRPM | HEART RATE: 87 BPM

## 2025-03-07 DIAGNOSIS — E87.6 HYPOKALEMIA: ICD-10-CM

## 2025-03-07 DIAGNOSIS — K21.9 GASTROESOPHAGEAL REFLUX DISEASE WITHOUT ESOPHAGITIS: ICD-10-CM

## 2025-03-07 DIAGNOSIS — C15.5 MALIGNANT NEOPLASM OF ABDOMINAL ESOPHAGUS: ICD-10-CM

## 2025-03-07 DIAGNOSIS — E87.6 HYPOKALEMIA: Primary | ICD-10-CM

## 2025-03-07 DIAGNOSIS — C15.5 MALIGNANT NEOPLASM OF ABDOMINAL ESOPHAGUS: Primary | ICD-10-CM

## 2025-03-07 LAB
ALBUMIN SERPL BCP-MCNC: 3 G/DL (ref 3.5–5.2)
ALP SERPL-CCNC: 138 U/L (ref 55–135)
ALT SERPL W/O P-5'-P-CCNC: 71 U/L (ref 10–44)
ANION GAP SERPL CALC-SCNC: 8 MMOL/L (ref 8–16)
AST SERPL-CCNC: 29 U/L (ref 10–40)
BASOPHILS # BLD AUTO: 0.01 K/UL (ref 0–0.2)
BASOPHILS NFR BLD: 0.3 % (ref 0–1.9)
BILIRUB SERPL-MCNC: 0.4 MG/DL (ref 0.1–1)
BUN SERPL-MCNC: 11 MG/DL (ref 8–23)
CALCIUM SERPL-MCNC: 8.4 MG/DL (ref 8.7–10.5)
CHLORIDE SERPL-SCNC: 94 MMOL/L (ref 95–110)
CO2 SERPL-SCNC: 31 MMOL/L (ref 23–29)
CREAT SERPL-MCNC: 0.5 MG/DL (ref 0.5–1.4)
DIFFERENTIAL METHOD BLD: ABNORMAL
EOSINOPHIL # BLD AUTO: 0 K/UL (ref 0–0.5)
EOSINOPHIL NFR BLD: 0 % (ref 0–8)
ERYTHROCYTE [DISTWIDTH] IN BLOOD BY AUTOMATED COUNT: 15.5 % (ref 11.5–14.5)
EST. GFR  (NO RACE VARIABLE): >60 ML/MIN/1.73 M^2
GLUCOSE SERPL-MCNC: 172 MG/DL (ref 70–110)
HCT VFR BLD AUTO: 30.9 % (ref 37–48.5)
HGB BLD-MCNC: 9.5 G/DL (ref 12–16)
IMM GRANULOCYTES # BLD AUTO: 0.09 K/UL (ref 0–0.04)
IMM GRANULOCYTES NFR BLD AUTO: 2.7 % (ref 0–0.5)
LYMPHOCYTES # BLD AUTO: 0.3 K/UL (ref 1–4.8)
LYMPHOCYTES NFR BLD: 8.5 % (ref 18–48)
MAGNESIUM SERPL-MCNC: 2.2 MG/DL (ref 1.6–2.6)
MCH RBC QN AUTO: 26 PG (ref 27–31)
MCHC RBC AUTO-ENTMCNC: 30.7 G/DL (ref 32–36)
MCV RBC AUTO: 84 FL (ref 82–98)
MONOCYTES # BLD AUTO: 0.4 K/UL (ref 0.3–1)
MONOCYTES NFR BLD: 11.8 % (ref 4–15)
NEUTROPHILS # BLD AUTO: 2.5 K/UL (ref 1.8–7.7)
NEUTROPHILS NFR BLD: 76.7 % (ref 38–73)
NRBC BLD-RTO: 0 /100 WBC
PLATELET # BLD AUTO: 201 K/UL (ref 150–450)
PLATELET BLD QL SMEAR: ABNORMAL
PMV BLD AUTO: 9.1 FL (ref 9.2–12.9)
POTASSIUM SERPL-SCNC: 2.9 MMOL/L (ref 3.5–5.1)
PROT SERPL-MCNC: 5.8 G/DL (ref 6–8.4)
RBC # BLD AUTO: 3.66 M/UL (ref 4–5.4)
SODIUM SERPL-SCNC: 133 MMOL/L (ref 136–145)
WBC # BLD AUTO: 3.3 K/UL (ref 3.9–12.7)

## 2025-03-07 PROCEDURE — 3078F DIAST BP <80 MM HG: CPT | Mod: CPTII,S$GLB,, | Performed by: INTERNAL MEDICINE

## 2025-03-07 PROCEDURE — 25000003 PHARM REV CODE 250: Performed by: INTERNAL MEDICINE

## 2025-03-07 PROCEDURE — A4216 STERILE WATER/SALINE, 10 ML: HCPCS | Performed by: INTERNAL MEDICINE

## 2025-03-07 PROCEDURE — 36415 COLL VENOUS BLD VENIPUNCTURE: CPT | Performed by: INTERNAL MEDICINE

## 2025-03-07 PROCEDURE — 3008F BODY MASS INDEX DOCD: CPT | Mod: CPTII,S$GLB,, | Performed by: INTERNAL MEDICINE

## 2025-03-07 PROCEDURE — 99215 OFFICE O/P EST HI 40 MIN: CPT | Mod: S$GLB,,, | Performed by: INTERNAL MEDICINE

## 2025-03-07 PROCEDURE — 96365 THER/PROPH/DIAG IV INF INIT: CPT

## 2025-03-07 PROCEDURE — 85025 COMPLETE CBC W/AUTO DIFF WBC: CPT | Performed by: INTERNAL MEDICINE

## 2025-03-07 PROCEDURE — 3074F SYST BP LT 130 MM HG: CPT | Mod: CPTII,S$GLB,, | Performed by: INTERNAL MEDICINE

## 2025-03-07 PROCEDURE — 99999 PR PBB SHADOW E&M-EST. PATIENT-LVL III: CPT | Mod: PBBFAC,,, | Performed by: INTERNAL MEDICINE

## 2025-03-07 PROCEDURE — 4010F ACE/ARB THERAPY RXD/TAKEN: CPT | Mod: CPTII,S$GLB,, | Performed by: INTERNAL MEDICINE

## 2025-03-07 PROCEDURE — G2211 COMPLEX E/M VISIT ADD ON: HCPCS | Mod: S$GLB,,, | Performed by: INTERNAL MEDICINE

## 2025-03-07 PROCEDURE — 63600175 PHARM REV CODE 636 W HCPCS: Performed by: INTERNAL MEDICINE

## 2025-03-07 PROCEDURE — 80053 COMPREHEN METABOLIC PANEL: CPT | Performed by: INTERNAL MEDICINE

## 2025-03-07 PROCEDURE — 83735 ASSAY OF MAGNESIUM: CPT | Performed by: INTERNAL MEDICINE

## 2025-03-07 PROCEDURE — 96366 THER/PROPH/DIAG IV INF ADDON: CPT

## 2025-03-07 RX ORDER — SODIUM CHLORIDE 0.9 % (FLUSH) 0.9 %
10 SYRINGE (ML) INJECTION
Status: CANCELLED | OUTPATIENT
Start: 2025-03-10

## 2025-03-07 RX ORDER — PANTOPRAZOLE SODIUM 20 MG/1
20 TABLET, DELAYED RELEASE ORAL DAILY
Qty: 30 TABLET | Refills: 1 | Status: SHIPPED | OUTPATIENT
Start: 2025-03-07 | End: 2025-03-31

## 2025-03-07 RX ORDER — EPINEPHRINE 0.3 MG/.3ML
0.3 INJECTION SUBCUTANEOUS ONCE AS NEEDED
Status: CANCELLED | OUTPATIENT
Start: 2025-03-10

## 2025-03-07 RX ORDER — POTASSIUM CHLORIDE 7.45 MG/ML
30 INJECTION INTRAVENOUS
Status: CANCELLED | OUTPATIENT
Start: 2025-03-07

## 2025-03-07 RX ORDER — SODIUM CHLORIDE 0.9 % (FLUSH) 0.9 %
10 SYRINGE (ML) INJECTION
Status: CANCELLED | OUTPATIENT
Start: 2025-03-11

## 2025-03-07 RX ORDER — FAMOTIDINE 40 MG/1
80 TABLET, FILM COATED ORAL NIGHTLY
Qty: 60 TABLET | Refills: 1 | Status: SHIPPED | OUTPATIENT
Start: 2025-03-07 | End: 2025-03-31

## 2025-03-07 RX ORDER — DIPHENHYDRAMINE HYDROCHLORIDE 50 MG/ML
50 INJECTION, SOLUTION INTRAMUSCULAR; INTRAVENOUS ONCE AS NEEDED
Status: CANCELLED | OUTPATIENT
Start: 2025-03-10

## 2025-03-07 RX ORDER — SODIUM CHLORIDE 0.9 % (FLUSH) 0.9 %
10 SYRINGE (ML) INJECTION
Status: DISCONTINUED | OUTPATIENT
Start: 2025-03-07 | End: 2025-03-07 | Stop reason: HOSPADM

## 2025-03-07 RX ORDER — HEPARIN 100 UNIT/ML
500 SYRINGE INTRAVENOUS
Status: CANCELLED | OUTPATIENT
Start: 2025-03-07

## 2025-03-07 RX ORDER — SODIUM CHLORIDE 0.9 % (FLUSH) 0.9 %
10 SYRINGE (ML) INJECTION
Status: CANCELLED | OUTPATIENT
Start: 2025-03-07

## 2025-03-07 RX ORDER — HEPARIN 100 UNIT/ML
500 SYRINGE INTRAVENOUS
Status: DISCONTINUED | OUTPATIENT
Start: 2025-03-07 | End: 2025-03-07 | Stop reason: HOSPADM

## 2025-03-07 RX ORDER — HEPARIN 100 UNIT/ML
500 SYRINGE INTRAVENOUS
Status: CANCELLED | OUTPATIENT
Start: 2025-03-10

## 2025-03-07 RX ORDER — HEPARIN 100 UNIT/ML
500 SYRINGE INTRAVENOUS
Status: CANCELLED | OUTPATIENT
Start: 2025-03-11

## 2025-03-07 RX ADMIN — POTASSIUM CHLORIDE: 2 INJECTION, SOLUTION, CONCENTRATE INTRAVENOUS at 01:03

## 2025-03-07 RX ADMIN — SODIUM CHLORIDE, PRESERVATIVE FREE 10 ML: 5 INJECTION INTRAVENOUS at 04:03

## 2025-03-07 RX ADMIN — HEPARIN 500 UNITS: 100 SYRINGE at 04:03

## 2025-03-07 NOTE — Clinical Note
IV potassium today. Check potassium level on Monday when she comes for chemo. RTC in 3 weeks with labs for next.

## 2025-03-10 ENCOUNTER — INFUSION (OUTPATIENT)
Dept: INFUSION THERAPY | Facility: HOSPITAL | Age: 60
End: 2025-03-10
Attending: INTERNAL MEDICINE
Payer: COMMERCIAL

## 2025-03-10 ENCOUNTER — LAB VISIT (OUTPATIENT)
Dept: LAB | Facility: HOSPITAL | Age: 60
End: 2025-03-10
Attending: INTERNAL MEDICINE
Payer: COMMERCIAL

## 2025-03-10 VITALS
SYSTOLIC BLOOD PRESSURE: 117 MMHG | RESPIRATION RATE: 18 BRPM | BODY MASS INDEX: 30.24 KG/M2 | OXYGEN SATURATION: 99 % | DIASTOLIC BLOOD PRESSURE: 83 MMHG | HEART RATE: 72 BPM | HEIGHT: 67 IN | TEMPERATURE: 98 F | WEIGHT: 192.63 LBS

## 2025-03-10 DIAGNOSIS — C15.5 MALIGNANT NEOPLASM OF ABDOMINAL ESOPHAGUS: Primary | ICD-10-CM

## 2025-03-10 DIAGNOSIS — E87.6 HYPOKALEMIA: ICD-10-CM

## 2025-03-10 LAB — POTASSIUM SERPL-SCNC: 3.7 MMOL/L (ref 3.5–5.1)

## 2025-03-10 PROCEDURE — 36415 COLL VENOUS BLD VENIPUNCTURE: CPT | Performed by: INTERNAL MEDICINE

## 2025-03-10 PROCEDURE — 96417 CHEMO IV INFUS EACH ADDL SEQ: CPT

## 2025-03-10 PROCEDURE — 84132 ASSAY OF SERUM POTASSIUM: CPT | Performed by: INTERNAL MEDICINE

## 2025-03-10 PROCEDURE — 96413 CHEMO IV INFUSION 1 HR: CPT

## 2025-03-10 PROCEDURE — 25000003 PHARM REV CODE 250: Performed by: INTERNAL MEDICINE

## 2025-03-10 PROCEDURE — 96415 CHEMO IV INFUSION ADDL HR: CPT

## 2025-03-10 PROCEDURE — 63600175 PHARM REV CODE 636 W HCPCS: Performed by: INTERNAL MEDICINE

## 2025-03-10 PROCEDURE — 96368 THER/DIAG CONCURRENT INF: CPT

## 2025-03-10 PROCEDURE — 96367 TX/PROPH/DG ADDL SEQ IV INF: CPT

## 2025-03-10 RX ORDER — EPINEPHRINE 0.3 MG/.3ML
0.3 INJECTION SUBCUTANEOUS ONCE AS NEEDED
Status: DISCONTINUED | OUTPATIENT
Start: 2025-03-10 | End: 2025-03-10 | Stop reason: HOSPADM

## 2025-03-10 RX ORDER — SODIUM CHLORIDE 0.9 % (FLUSH) 0.9 %
10 SYRINGE (ML) INJECTION
Status: DISCONTINUED | OUTPATIENT
Start: 2025-03-10 | End: 2025-03-10 | Stop reason: HOSPADM

## 2025-03-10 RX ORDER — DIPHENHYDRAMINE HYDROCHLORIDE 50 MG/ML
50 INJECTION, SOLUTION INTRAMUSCULAR; INTRAVENOUS ONCE AS NEEDED
Status: DISCONTINUED | OUTPATIENT
Start: 2025-03-10 | End: 2025-03-10 | Stop reason: HOSPADM

## 2025-03-10 RX ADMIN — DEXTROSE MONOHYDRATE: 50 INJECTION, SOLUTION INTRAVENOUS at 08:03

## 2025-03-10 RX ADMIN — FLUOROURACIL 5280 MG: 50 INJECTION, SOLUTION INTRAVENOUS at 11:03

## 2025-03-10 RX ADMIN — DOCETAXEL ANHYDROUS 102 MG: 10 INJECTION, SOLUTION INTRAVENOUS at 08:03

## 2025-03-10 RX ADMIN — SODIUM CHLORIDE 0.25 MG: 9 INJECTION, SOLUTION INTRAVENOUS at 08:03

## 2025-03-10 RX ADMIN — DEXTROSE MONOHYDRATE 405 MG: 50 INJECTION, SOLUTION INTRAVENOUS at 09:03

## 2025-03-10 RX ADMIN — OXALIPLATIN 173 MG: 5 INJECTION, SOLUTION INTRAVENOUS at 09:03

## 2025-03-10 NOTE — PLAN OF CARE
Problem: Fatigue  Goal: Improved Activity Tolerance  Outcome: Progressing  Intervention: Promote Improved Energy  Flowsheets (Taken 3/10/2025 0807)  Fatigue Management:   fatigue-related activity identified   paced activity encouraged   frequent rest breaks encouraged  Sleep/Rest Enhancement:   noise level reduced   relaxation techniques promoted   regular sleep/rest pattern promoted  Activity Management:   Ambulated -L4   Up in chair - L3  Environmental Support:   calm environment promoted   distractions minimized   rest periods encouraged

## 2025-03-11 ENCOUNTER — INFUSION (OUTPATIENT)
Dept: INFUSION THERAPY | Facility: HOSPITAL | Age: 60
End: 2025-03-11
Attending: INTERNAL MEDICINE
Payer: COMMERCIAL

## 2025-03-11 ENCOUNTER — CLINICAL SUPPORT (OUTPATIENT)
Dept: RADIATION ONCOLOGY | Facility: CLINIC | Age: 60
End: 2025-03-11
Payer: COMMERCIAL

## 2025-03-11 VITALS
RESPIRATION RATE: 18 BRPM | SYSTOLIC BLOOD PRESSURE: 101 MMHG | DIASTOLIC BLOOD PRESSURE: 60 MMHG | HEIGHT: 67 IN | OXYGEN SATURATION: 99 % | BODY MASS INDEX: 30.1 KG/M2 | WEIGHT: 191.81 LBS | HEART RATE: 80 BPM | TEMPERATURE: 97 F

## 2025-03-11 DIAGNOSIS — C15.5 MALIGNANT NEOPLASM OF ABDOMINAL ESOPHAGUS: Primary | ICD-10-CM

## 2025-03-11 PROCEDURE — 25000003 PHARM REV CODE 250: Performed by: NURSE PRACTITIONER

## 2025-03-11 PROCEDURE — 96365 THER/PROPH/DIAG IV INF INIT: CPT

## 2025-03-11 PROCEDURE — 96375 TX/PRO/DX INJ NEW DRUG ADDON: CPT

## 2025-03-11 PROCEDURE — 63600175 PHARM REV CODE 636 W HCPCS: Performed by: INTERNAL MEDICINE

## 2025-03-11 PROCEDURE — 96523 IRRIG DRUG DELIVERY DEVICE: CPT

## 2025-03-11 PROCEDURE — 63600175 PHARM REV CODE 636 W HCPCS: Performed by: NURSE PRACTITIONER

## 2025-03-11 PROCEDURE — 96361 HYDRATE IV INFUSION ADD-ON: CPT

## 2025-03-11 PROCEDURE — 99499 UNLISTED E&M SERVICE: CPT | Mod: 93,,, | Performed by: RADIOLOGY

## 2025-03-11 RX ORDER — SODIUM CHLORIDE 0.9 % (FLUSH) 0.9 %
10 SYRINGE (ML) INJECTION
Status: DISCONTINUED | OUTPATIENT
Start: 2025-03-11 | End: 2025-03-11 | Stop reason: HOSPADM

## 2025-03-11 RX ORDER — ONDANSETRON HCL IN 0.9 % NACL 8 MG/50 ML
8 INTRAVENOUS SOLUTION, PIGGYBACK (ML) INTRAVENOUS
Status: COMPLETED | OUTPATIENT
Start: 2025-03-11 | End: 2025-03-11

## 2025-03-11 RX ORDER — HEPARIN 100 UNIT/ML
500 SYRINGE INTRAVENOUS
Status: DISCONTINUED | OUTPATIENT
Start: 2025-03-11 | End: 2025-03-11 | Stop reason: HOSPADM

## 2025-03-11 RX ORDER — LORAZEPAM 2 MG/ML
0.5 INJECTION INTRAMUSCULAR
Status: DISCONTINUED | OUTPATIENT
Start: 2025-03-11 | End: 2025-03-11 | Stop reason: HOSPADM

## 2025-03-11 RX ADMIN — HEPARIN 500 UNITS: 100 SYRINGE at 02:03

## 2025-03-11 RX ADMIN — SODIUM CHLORIDE 1000 ML: 9 INJECTION, SOLUTION INTRAVENOUS at 12:03

## 2025-03-11 RX ADMIN — LORAZEPAM 0.5 MG: 2 INJECTION INTRAMUSCULAR; INTRAVENOUS at 12:03

## 2025-03-11 RX ADMIN — SODIUM CHLORIDE 8 MG: 9 INJECTION, SOLUTION INTRAVENOUS at 12:03

## 2025-03-11 NOTE — PROGRESS NOTES
DIAGNOSIS: Metastatic esophageal CA    TREATMENT      Contacted patient today for 3 week checkup. Pt did not answer - left vmail w/ contact info.    A/P  1.  Continue systemic therapy as directed by [facility].  2.  Follow-up with Dr. Khoobehi and Dr. Anderson as directed  3.  Return to clinic in 6m

## 2025-03-19 ENCOUNTER — TELEPHONE (OUTPATIENT)
Dept: HEMATOLOGY/ONCOLOGY | Facility: CLINIC | Age: 60
End: 2025-03-19
Payer: COMMERCIAL

## 2025-03-19 ENCOUNTER — HOSPITAL ENCOUNTER (INPATIENT)
Facility: HOSPITAL | Age: 60
LOS: 5 days | Discharge: HOME | DRG: 871 | End: 2025-03-24
Attending: EMERGENCY MEDICINE | Admitting: HOSPITALIST
Payer: COMMERCIAL

## 2025-03-19 DIAGNOSIS — R07.9 CHEST PAIN: ICD-10-CM

## 2025-03-19 DIAGNOSIS — R50.9 FEVER: ICD-10-CM

## 2025-03-19 DIAGNOSIS — I10 PRIMARY HYPERTENSION: ICD-10-CM

## 2025-03-19 DIAGNOSIS — E78.5 DYSLIPIDEMIA: ICD-10-CM

## 2025-03-19 DIAGNOSIS — D70.9 FEBRILE NEUTROPENIA: Primary | ICD-10-CM

## 2025-03-19 DIAGNOSIS — E43 SEVERE MALNUTRITION: ICD-10-CM

## 2025-03-19 DIAGNOSIS — R06.09 DOE (DYSPNEA ON EXERTION): ICD-10-CM

## 2025-03-19 DIAGNOSIS — R50.81 FEBRILE NEUTROPENIA: Primary | ICD-10-CM

## 2025-03-19 DIAGNOSIS — I44.7 LBBB (LEFT BUNDLE BRANCH BLOCK): ICD-10-CM

## 2025-03-19 DIAGNOSIS — Z13.6 SCREENING FOR CARDIOVASCULAR CONDITION: ICD-10-CM

## 2025-03-19 DIAGNOSIS — D69.6 THROMBOCYTOPENIA: ICD-10-CM

## 2025-03-19 PROBLEM — E87.1 HYPONATREMIA: Status: ACTIVE | Noted: 2025-03-19

## 2025-03-19 PROBLEM — D64.9 ANEMIA: Status: ACTIVE | Noted: 2025-03-19

## 2025-03-19 LAB
ALBUMIN SERPL BCP-MCNC: 2.8 G/DL (ref 3.5–5.2)
ALP SERPL-CCNC: 128 U/L (ref 55–135)
ALT SERPL W/O P-5'-P-CCNC: 39 U/L (ref 10–44)
ANION GAP SERPL CALC-SCNC: 8 MMOL/L (ref 8–16)
AST SERPL-CCNC: 29 U/L (ref 10–40)
BACTERIA #/AREA URNS HPF: ABNORMAL /HPF
BASOPHILS # BLD AUTO: 0.01 K/UL (ref 0–0.2)
BASOPHILS NFR BLD: 0.4 % (ref 0–1.9)
BILIRUB SERPL-MCNC: 0.7 MG/DL (ref 0.1–1)
BILIRUB UR QL STRIP: NEGATIVE
BUN SERPL-MCNC: 12 MG/DL (ref 8–23)
CALCIUM SERPL-MCNC: 8.2 MG/DL (ref 8.7–10.5)
CHLORIDE SERPL-SCNC: 94 MMOL/L (ref 95–110)
CLARITY UR: CLEAR
CO2 SERPL-SCNC: 29 MMOL/L (ref 23–29)
COLOR UR: YELLOW
CREAT SERPL-MCNC: 0.5 MG/DL (ref 0.5–1.4)
DIFFERENTIAL METHOD BLD: ABNORMAL
EOSINOPHIL # BLD AUTO: 0 K/UL (ref 0–0.5)
EOSINOPHIL NFR BLD: 0 % (ref 0–8)
ERYTHROCYTE [DISTWIDTH] IN BLOOD BY AUTOMATED COUNT: 16.3 % (ref 11.5–14.5)
EST. GFR  (NO RACE VARIABLE): >60 ML/MIN/1.73 M^2
GLUCOSE SERPL-MCNC: 144 MG/DL (ref 70–110)
GLUCOSE UR QL STRIP: NEGATIVE
GROUP A STREP, MOLECULAR: NEGATIVE
HCT VFR BLD AUTO: 28.6 % (ref 37–48.5)
HGB BLD-MCNC: 9.1 G/DL (ref 12–16)
HGB UR QL STRIP: NEGATIVE
HYALINE CASTS #/AREA URNS LPF: 0 /LPF
HYPOCHROMIA BLD QL SMEAR: ABNORMAL
IMM GRANULOCYTES # BLD AUTO: 0.02 K/UL (ref 0–0.04)
IMM GRANULOCYTES NFR BLD AUTO: 0.9 % (ref 0–0.5)
INFLUENZA A, MOLECULAR: NEGATIVE
INFLUENZA B, MOLECULAR: NEGATIVE
KETONES UR QL STRIP: ABNORMAL
LACTATE SERPL-SCNC: 0.8 MMOL/L (ref 0.5–1.9)
LDH SERPL L TO P-CCNC: 1.44 MMOL/L (ref 0.5–2.2)
LEUKOCYTE ESTERASE UR QL STRIP: NEGATIVE
LYMPHOCYTES # BLD AUTO: 0.5 K/UL (ref 1–4.8)
LYMPHOCYTES NFR BLD: 19.8 % (ref 18–48)
MCH RBC QN AUTO: 26.2 PG (ref 27–31)
MCHC RBC AUTO-ENTMCNC: 31.8 G/DL (ref 32–36)
MCV RBC AUTO: 82 FL (ref 82–98)
MICROSCOPIC COMMENT: ABNORMAL
MONOCYTES # BLD AUTO: 0.8 K/UL (ref 0.3–1)
MONOCYTES NFR BLD: 36.6 % (ref 4–15)
NEUTROPHILS # BLD AUTO: 1 K/UL (ref 1.8–7.7)
NEUTROPHILS NFR BLD: 42.3 % (ref 38–73)
NITRITE UR QL STRIP: NEGATIVE
NRBC BLD-RTO: 1 /100 WBC
PH UR STRIP: 7 [PH] (ref 5–8)
PLATELET # BLD AUTO: 155 K/UL (ref 150–450)
PLATELET BLD QL SMEAR: ABNORMAL
PMV BLD AUTO: 9.7 FL (ref 9.2–12.9)
POTASSIUM SERPL-SCNC: 3.2 MMOL/L (ref 3.5–5.1)
PROT SERPL-MCNC: 5.9 G/DL (ref 6–8.4)
PROT UR QL STRIP: ABNORMAL
RBC # BLD AUTO: 3.47 M/UL (ref 4–5.4)
RBC #/AREA URNS HPF: 2 /HPF (ref 0–4)
SAMPLE: NORMAL
SARS-COV-2 RDRP RESP QL NAA+PROBE: NEGATIVE
SODIUM SERPL-SCNC: 131 MMOL/L (ref 136–145)
SP GR UR STRIP: 1.02 (ref 1–1.03)
SPECIMEN SOURCE: NORMAL
SQUAMOUS #/AREA URNS HPF: 5 /HPF
URN SPEC COLLECT METH UR: ABNORMAL
UROBILINOGEN UR STRIP-ACNC: ABNORMAL EU/DL
WBC # BLD AUTO: 2.27 K/UL (ref 3.9–12.7)
WBC #/AREA URNS HPF: 4 /HPF (ref 0–5)
YEAST URNS QL MICRO: ABNORMAL

## 2025-03-19 PROCEDURE — 25000003 PHARM REV CODE 250: Performed by: NURSE PRACTITIONER

## 2025-03-19 PROCEDURE — 99285 EMERGENCY DEPT VISIT HI MDM: CPT | Mod: 25

## 2025-03-19 PROCEDURE — 63600175 PHARM REV CODE 636 W HCPCS: Performed by: EMERGENCY MEDICINE

## 2025-03-19 PROCEDURE — 83605 ASSAY OF LACTIC ACID: CPT | Performed by: EMERGENCY MEDICINE

## 2025-03-19 PROCEDURE — 27000207 HC ISOLATION

## 2025-03-19 PROCEDURE — 87635 SARS-COV-2 COVID-19 AMP PRB: CPT | Performed by: EMERGENCY MEDICINE

## 2025-03-19 PROCEDURE — 96374 THER/PROPH/DIAG INJ IV PUSH: CPT

## 2025-03-19 PROCEDURE — 96375 TX/PRO/DX INJ NEW DRUG ADDON: CPT

## 2025-03-19 PROCEDURE — 87040 BLOOD CULTURE FOR BACTERIA: CPT | Performed by: NURSE PRACTITIONER

## 2025-03-19 PROCEDURE — 12000002 HC ACUTE/MED SURGE SEMI-PRIVATE ROOM

## 2025-03-19 PROCEDURE — 80053 COMPREHEN METABOLIC PANEL: CPT | Performed by: NURSE PRACTITIONER

## 2025-03-19 PROCEDURE — 93010 ELECTROCARDIOGRAM REPORT: CPT | Mod: ,,, | Performed by: GENERAL PRACTICE

## 2025-03-19 PROCEDURE — 87651 STREP A DNA AMP PROBE: CPT | Performed by: NURSE PRACTITIONER

## 2025-03-19 PROCEDURE — 25000003 PHARM REV CODE 250: Performed by: EMERGENCY MEDICINE

## 2025-03-19 PROCEDURE — 87502 INFLUENZA DNA AMP PROBE: CPT | Performed by: EMERGENCY MEDICINE

## 2025-03-19 PROCEDURE — 93005 ELECTROCARDIOGRAM TRACING: CPT | Performed by: GENERAL PRACTICE

## 2025-03-19 PROCEDURE — 63600175 PHARM REV CODE 636 W HCPCS: Performed by: NURSE PRACTITIONER

## 2025-03-19 PROCEDURE — 81001 URINALYSIS AUTO W/SCOPE: CPT | Performed by: NURSE PRACTITIONER

## 2025-03-19 PROCEDURE — 85025 COMPLETE CBC W/AUTO DIFF WBC: CPT | Performed by: NURSE PRACTITIONER

## 2025-03-19 RX ORDER — FAMOTIDINE 20 MG/1
80 TABLET, FILM COATED ORAL NIGHTLY
Status: DISCONTINUED | OUTPATIENT
Start: 2025-03-19 | End: 2025-03-19

## 2025-03-19 RX ORDER — ALUMINUM HYDROXIDE, MAGNESIUM HYDROXIDE, AND SIMETHICONE 1200; 120; 1200 MG/30ML; MG/30ML; MG/30ML
30 SUSPENSION ORAL ONCE
Status: DISCONTINUED | OUTPATIENT
Start: 2025-03-19 | End: 2025-03-24 | Stop reason: HOSPADM

## 2025-03-19 RX ORDER — ALBUTEROL SULFATE 0.83 MG/ML
2.5 SOLUTION RESPIRATORY (INHALATION) EVERY 4 HOURS PRN
Status: DISCONTINUED | OUTPATIENT
Start: 2025-03-19 | End: 2025-03-24 | Stop reason: HOSPADM

## 2025-03-19 RX ORDER — ENOXAPARIN SODIUM 100 MG/ML
40 INJECTION SUBCUTANEOUS EVERY 24 HOURS
Status: DISCONTINUED | OUTPATIENT
Start: 2025-03-19 | End: 2025-03-24 | Stop reason: HOSPADM

## 2025-03-19 RX ORDER — LIDOCAINE HYDROCHLORIDE 20 MG/ML
15 SOLUTION OROPHARYNGEAL ONCE
Status: DISCONTINUED | OUTPATIENT
Start: 2025-03-19 | End: 2025-03-24 | Stop reason: HOSPADM

## 2025-03-19 RX ORDER — SODIUM,POTASSIUM PHOSPHATES 280-250MG
2 POWDER IN PACKET (EA) ORAL
Status: DISCONTINUED | OUTPATIENT
Start: 2025-03-19 | End: 2025-03-24 | Stop reason: HOSPADM

## 2025-03-19 RX ORDER — ALUMINUM HYDROXIDE, MAGNESIUM HYDROXIDE, AND SIMETHICONE 1200; 120; 1200 MG/30ML; MG/30ML; MG/30ML
30 SUSPENSION ORAL ONCE
Status: COMPLETED | OUTPATIENT
Start: 2025-03-19 | End: 2025-03-19

## 2025-03-19 RX ORDER — LANOLIN ALCOHOL/MO/W.PET/CERES
800 CREAM (GRAM) TOPICAL
Status: DISCONTINUED | OUTPATIENT
Start: 2025-03-19 | End: 2025-03-24 | Stop reason: HOSPADM

## 2025-03-19 RX ORDER — MUPIROCIN 20 MG/G
OINTMENT TOPICAL 2 TIMES DAILY
Status: DISCONTINUED | OUTPATIENT
Start: 2025-03-19 | End: 2025-03-24 | Stop reason: HOSPADM

## 2025-03-19 RX ORDER — SODIUM CHLORIDE 0.9 % (FLUSH) 0.9 %
2 SYRINGE (ML) INJECTION
Status: DISCONTINUED | OUTPATIENT
Start: 2025-03-19 | End: 2025-03-24 | Stop reason: HOSPADM

## 2025-03-19 RX ORDER — IBUPROFEN 200 MG
600 TABLET ORAL EVERY 6 HOURS PRN
Status: ON HOLD | COMMUNITY
End: 2025-03-24 | Stop reason: HOSPADM

## 2025-03-19 RX ORDER — ACETAMINOPHEN 325 MG/1
650 TABLET ORAL EVERY 8 HOURS PRN
Status: DISCONTINUED | OUTPATIENT
Start: 2025-03-19 | End: 2025-03-21

## 2025-03-19 RX ORDER — NALOXONE HCL 0.4 MG/ML
0.02 VIAL (ML) INJECTION
Status: DISCONTINUED | OUTPATIENT
Start: 2025-03-19 | End: 2025-03-24 | Stop reason: HOSPADM

## 2025-03-19 RX ORDER — FAMOTIDINE 20 MG/1
20 TABLET, FILM COATED ORAL 2 TIMES DAILY
Status: DISCONTINUED | OUTPATIENT
Start: 2025-03-20 | End: 2025-03-21

## 2025-03-19 RX ORDER — ALUMINUM HYDROXIDE, MAGNESIUM HYDROXIDE, AND SIMETHICONE 1200; 120; 1200 MG/30ML; MG/30ML; MG/30ML
30 SUSPENSION ORAL EVERY 6 HOURS PRN
Status: DISCONTINUED | OUTPATIENT
Start: 2025-03-19 | End: 2025-03-24 | Stop reason: HOSPADM

## 2025-03-19 RX ORDER — ACETAMINOPHEN 325 MG/1
650 TABLET ORAL EVERY 4 HOURS PRN
Status: DISCONTINUED | OUTPATIENT
Start: 2025-03-19 | End: 2025-03-21

## 2025-03-19 RX ORDER — LORAZEPAM 0.5 MG/1
0.5 TABLET ORAL EVERY 6 HOURS PRN
Status: DISCONTINUED | OUTPATIENT
Start: 2025-03-19 | End: 2025-03-24 | Stop reason: HOSPADM

## 2025-03-19 RX ORDER — ALUMINUM HYDROXIDE, MAGNESIUM HYDROXIDE, AND SIMETHICONE 1200; 120; 1200 MG/30ML; MG/30ML; MG/30ML
30 SUSPENSION ORAL
Status: COMPLETED | OUTPATIENT
Start: 2025-03-19 | End: 2025-03-19

## 2025-03-19 RX ORDER — IBUPROFEN 200 MG
24 TABLET ORAL
Status: DISCONTINUED | OUTPATIENT
Start: 2025-03-19 | End: 2025-03-24 | Stop reason: HOSPADM

## 2025-03-19 RX ORDER — CETIRIZINE HYDROCHLORIDE 10 MG/1
10 TABLET ORAL DAILY
Status: DISCONTINUED | OUTPATIENT
Start: 2025-03-20 | End: 2025-03-24 | Stop reason: HOSPADM

## 2025-03-19 RX ORDER — CEFEPIME HYDROCHLORIDE 2 G/1
2 INJECTION, POWDER, FOR SOLUTION INTRAVENOUS ONCE
Status: COMPLETED | OUTPATIENT
Start: 2025-03-19 | End: 2025-03-19

## 2025-03-19 RX ORDER — ALUMINUM HYDROXIDE, MAGNESIUM HYDROXIDE, AND SIMETHICONE 1200; 120; 1200 MG/30ML; MG/30ML; MG/30ML
5 SUSPENSION ORAL
Status: DISCONTINUED | OUTPATIENT
Start: 2025-03-19 | End: 2025-03-19

## 2025-03-19 RX ORDER — LIDOCAINE HYDROCHLORIDE 20 MG/ML
15 SOLUTION OROPHARYNGEAL ONCE
Status: COMPLETED | OUTPATIENT
Start: 2025-03-19 | End: 2025-03-19

## 2025-03-19 RX ORDER — TALC
6 POWDER (GRAM) TOPICAL NIGHTLY PRN
Status: DISCONTINUED | OUTPATIENT
Start: 2025-03-19 | End: 2025-03-24 | Stop reason: HOSPADM

## 2025-03-19 RX ORDER — GLUCAGON 1 MG
1 KIT INJECTION
Status: DISCONTINUED | OUTPATIENT
Start: 2025-03-19 | End: 2025-03-24 | Stop reason: HOSPADM

## 2025-03-19 RX ORDER — FLUTICASONE PROPIONATE 50 MCG
1 SPRAY, SUSPENSION (ML) NASAL DAILY
Status: DISCONTINUED | OUTPATIENT
Start: 2025-03-20 | End: 2025-03-23

## 2025-03-19 RX ORDER — HYDROCODONE BITARTRATE AND ACETAMINOPHEN 10; 325 MG/1; MG/1
1 TABLET ORAL
Refills: 0 | Status: COMPLETED | OUTPATIENT
Start: 2025-03-19 | End: 2025-03-19

## 2025-03-19 RX ORDER — ONDANSETRON HYDROCHLORIDE 2 MG/ML
4 INJECTION, SOLUTION INTRAVENOUS
Status: COMPLETED | OUTPATIENT
Start: 2025-03-19 | End: 2025-03-19

## 2025-03-19 RX ORDER — LIDOCAINE HYDROCHLORIDE 20 MG/ML
15 SOLUTION OROPHARYNGEAL ONCE
Status: COMPLETED | OUTPATIENT
Start: 2025-03-19 | End: 2025-03-20

## 2025-03-19 RX ORDER — FAMOTIDINE 10 MG/ML
20 INJECTION, SOLUTION INTRAVENOUS
Status: COMPLETED | OUTPATIENT
Start: 2025-03-19 | End: 2025-03-19

## 2025-03-19 RX ORDER — AZELASTINE 1 MG/ML
2 SPRAY, METERED NASAL 2 TIMES DAILY
Status: DISCONTINUED | OUTPATIENT
Start: 2025-03-20 | End: 2025-03-23

## 2025-03-19 RX ORDER — ALUMINUM HYDROXIDE, MAGNESIUM HYDROXIDE, AND SIMETHICONE 2400; 240; 2400 MG/30ML; MG/30ML; MG/30ML
10 SUSPENSION ORAL EVERY 6 HOURS PRN
COMMUNITY

## 2025-03-19 RX ORDER — HYDROCODONE BITARTRATE AND ACETAMINOPHEN 10; 325 MG/1; MG/1
1 TABLET ORAL EVERY 6 HOURS PRN
Status: DISCONTINUED | OUTPATIENT
Start: 2025-03-19 | End: 2025-03-24 | Stop reason: HOSPADM

## 2025-03-19 RX ORDER — SCOPOLAMINE 1 MG/3D
1 PATCH, EXTENDED RELEASE TRANSDERMAL
Status: DISCONTINUED | OUTPATIENT
Start: 2025-03-19 | End: 2025-03-24 | Stop reason: HOSPADM

## 2025-03-19 RX ORDER — ALBUTEROL SULFATE 90 UG/1
2 INHALANT RESPIRATORY (INHALATION) EVERY 4 HOURS PRN
Status: DISCONTINUED | OUTPATIENT
Start: 2025-03-19 | End: 2025-03-19

## 2025-03-19 RX ORDER — IBUPROFEN 200 MG
16 TABLET ORAL
Status: DISCONTINUED | OUTPATIENT
Start: 2025-03-19 | End: 2025-03-24 | Stop reason: HOSPADM

## 2025-03-19 RX ORDER — HYDROCODONE BITARTRATE AND ACETAMINOPHEN 5; 325 MG/1; MG/1
1 TABLET ORAL EVERY 6 HOURS PRN
Refills: 0 | Status: DISCONTINUED | OUTPATIENT
Start: 2025-03-19 | End: 2025-03-24 | Stop reason: HOSPADM

## 2025-03-19 RX ORDER — AMOXICILLIN 250 MG
1 CAPSULE ORAL 2 TIMES DAILY PRN
Status: DISCONTINUED | OUTPATIENT
Start: 2025-03-19 | End: 2025-03-24 | Stop reason: HOSPADM

## 2025-03-19 RX ADMIN — POTASSIUM BICARBONATE 25 MEQ: 978 TABLET, EFFERVESCENT ORAL at 04:03

## 2025-03-19 RX ADMIN — FAMOTIDINE 20 MG: 10 INJECTION, SOLUTION INTRAVENOUS at 08:03

## 2025-03-19 RX ADMIN — ONDANSETRON 4 MG: 2 INJECTION INTRAMUSCULAR; INTRAVENOUS at 08:03

## 2025-03-19 RX ADMIN — SCOPOLAMINE 1 PATCH: 1.5 PATCH, EXTENDED RELEASE TRANSDERMAL at 10:03

## 2025-03-19 RX ADMIN — CEFEPIME 2 G: 2 INJECTION, POWDER, FOR SOLUTION INTRAVENOUS at 03:03

## 2025-03-19 RX ADMIN — ALUMINUM HYDROXIDE, MAGNESIUM HYDROXIDE, AND SIMETHICONE 30 ML: 200; 200; 20 SUSPENSION ORAL at 08:03

## 2025-03-19 RX ADMIN — HYDROCODONE BITARTRATE AND ACETAMINOPHEN 1 TABLET: 10; 325 TABLET ORAL at 04:03

## 2025-03-19 RX ADMIN — ALUMINUM HYDROXIDE, MAGNESIUM HYDROXIDE, AND SIMETHICONE 30 ML: 200; 200; 20 SUSPENSION ORAL at 05:03

## 2025-03-19 RX ADMIN — ENOXAPARIN SODIUM 40 MG: 40 INJECTION SUBCUTANEOUS at 10:03

## 2025-03-19 RX ADMIN — MUPIROCIN: 20 OINTMENT TOPICAL at 11:03

## 2025-03-19 RX ADMIN — LIDOCAINE HYDROCHLORIDE 15 ML: 20 SOLUTION ORAL at 08:03

## 2025-03-19 NOTE — FIRST PROVIDER EVALUATION
Emergency Department TeleTriage Encounter Note      CHIEF COMPLAINT    Chief Complaint   Patient presents with    Fever Post Chemo     9 days ago received chemo for esophageal cancer, 101.6 at home today and took norco . Oncology sent here for concerns of sepsis     Chills       VITAL SIGNS   Initial Vitals [03/19/25 1326]   BP Pulse Resp Temp SpO2   101/69 96 16 98.9 °F (37.2 °C) 96 %      MAP       --            ALLERGIES    Review of patient's allergies indicates:  No Known Allergies    PROVIDER TRIAGE NOTE  Had chemo 9 days ago. Woke up this morning and had chills and fever. Had vomiting today but states not uncommon for her. Reports mild sore throat. Denies dysuria.    Limited physical exam via telehealth: The patient is awake, alert, answering questions appropriately and is not in respiratory distress.  As the Teletriage provider, I performed an initial assessment and ordered appropriate labs and imaging studies, if any, to facilitate the patient's care once placed in the ED. Once a room is available, care and a full evaluation will be completed by an alternate ED provider.  Any additional orders and the final disposition will be determined by that provider.  All imaging and labs will not be followed-up by the Teletriage Team, including myself.          ORDERS  Labs Reviewed - No data to display    ED Orders (720h ago, onward)      Start Ordered     Status Ordering Provider    03/19/25 1740 03/19/25 1339  POCT Venous Blood Gas (Lactate) #2  Once        Comments: This test should be used for VBGs.  If using this order for other tests (K, creatinine, HCT, PT/INR, lactate etc)  ONLY do so in the case of an emergency or rapid response.Notify Physician if: see parameters below.      Ordered GONZALO MENDOZA.    03/19/25 1342 03/19/25 1341  Respiratory Infection Panel (PCR), Nasopharyngeal  Once        Comments:        Ordered GONZALO MENDOZA.    03/19/25 1342 03/19/25 1341  X-Ray Chest PA And Lateral  1  time imaging         Ordered GONZALO MENDOZA.    03/19/25 1341 03/19/25 1341  Group A Strep, Molecular  Once         Ordered GONZALO MENDOZA.    03/19/25 1340 03/19/25 1339  ED Preference List Used to Initiate Sepsis Orders  Until discontinued         Ordered GONZALO MENDOZA.    03/19/25 1340 03/19/25 1339  Blood culture x two cultures. Draw prior to antibiotics.  Every 15 min      Comments: Aerobic and anaerobic     Order ID Start Status Ordering Provider   [id number] 03/19/25 1340 Pending Collection GONZALO MENDOZA   [id number] 03/19/25 1355 Pending Collection GONZALO MENDOZA.       Ordered BEARRGONZALO.    03/19/25 1340 03/19/25 1339  CBC auto differential  STAT         Ordered BEARRGONZALO.    03/19/25 1340 03/19/25 1339  Comprehensive metabolic panel  STAT         Ordered GONZALO MENDOZA.    03/19/25 1340 03/19/25 1339  Vital signs  Every 15 min        Comments: Every 15 minutes until SBP greater than 90 or MAP greater than 65, then every 30 minutes times one hour, then every one hour.    Ordered BEARRGONZALO.    03/19/25 1340 03/19/25 1339  Bed rest  Until discontinued         Ordered KAVYA MENDOZAA A.    03/19/25 1340 03/19/25 1339  Cardiac Monitoring - Adult  Continuous        Comments: Notify Physician If:    Ordered GONZALO MENDOZA.    03/19/25 1340 03/19/25 1339  Pulse Oximetry Continuous  Continuous         Ordered KAVYA MENDOZAA A.    03/19/25 1340 03/19/25 1339  Strict intake and output  Until discontinued         Ordered HAYDERDARKAVYAA A.    03/19/25 1340 03/19/25 1339  Urinalysis, Reflex to Urine Culture Urine, Clean Catch  STAT         Ordered BEARRGONZALO.    03/19/25 1340 03/19/25 1339  Saline lock IV  Once         Ordered HAYDERDARKAVYAA A.    03/19/25 1340 03/19/25 1339  EKG 12-lead  Once         Ordered HAYDERDARGONZALO.    03/19/25 1340 03/19/25 1339  POCT Venous Blood Gas (Lactate) #1  Once        Comments: This test should be used for VBGs.  If using this order for  other tests (K, creatinine, HCT, PT/INR, lactate etc)  ONLY do so in the case of an emergency or rapid response.Notify Physician if: see parameters below.      GONZALO Marcial              Virtual Visit Note: The provider triage portion of this emergency department evaluation and documentation was performed via [employer], a HIPAA-compliant telemedicine application, in concert with a tele-presenter in the room. A face to face patient evaluation with one of my colleagues will occur once the patient is placed in an emergency department room.      DISCLAIMER: This note was prepared with [employer] voice recognition transcription software. Garbled syntax, mangled pronouns, and other bizarre constructions may be attributed to that software system.

## 2025-03-19 NOTE — ED NOTES
Pt to er with c/o fever. She states she currently takes chemo for esophageal cancer. Last treatment @ 9 days ago.

## 2025-03-19 NOTE — TELEPHONE ENCOUNTER
Incoming call from pt stating that she currently has a fever of 101.8 and bad chills. She reports vomiting last night. Last chemo 3/10. Pt advised to go to ED. MD notified.

## 2025-03-19 NOTE — ED PROVIDER NOTES
Encounter Date: 3/19/2025       History     Chief Complaint   Patient presents with    Fever Post Chemo     9 days ago received chemo for esophageal cancer, 101.6 at home today and took norco . Oncology sent here for concerns of sepsis     Chills     Patient with a history of esophageal cancer.  Status post radiation treatment.  Currently on chemotherapy.  Last chemotherapy was not days ago.  She reports fever and chills today.  Fever up to 101.6° F.  No productive cough, no abdominal pain no diarrhea.  Mild sore throat.      Review of patient's allergies indicates:  No Known Allergies  Past Medical History:   Diagnosis Date    Esophageal cancer 01/04/2025    Hypertension 2009    Obesity      Past Surgical History:   Procedure Laterality Date    ADENOIDECTOMY      COLONOSCOPY W/ POLYPECTOMY  1989    Age 24 - polyps negative    HYSTERECTOMY  2009    Partial Hysterectomy    INSERTION OF TUNNELED CENTRAL VENOUS CATHETER (CVC) WITH SUBCUTANEOUS PORT Left 1/30/2025    Procedure: [id number];  Surgeon: Patrick Whelan Jr., MD;  Location: [facility] OR;  Service: General;  Laterality: Left;    INSERTION, GASTROSTOMY TUBE, LAPAROSCOPIC N/A 1/30/2025    Procedure: INSERTION, GASTROSTOMY TUBE, LAPAROSCOPIC;  Surgeon: Patrick Whelan Jr., MD;  Location: [facility] OR;  Service: General;  Laterality: N/A;    KNEE RECONSTRUCTION, MEDIAL PATELLAR FEMORAL LIGAMENT Bilateral     left ALSO RIGHT KNEE SCOPE    TONSILLECTOMY       Family History   Problem Relation Name Age of Onset    Tuberculosis Mother      COPD Mother      Heart disease Mother  70    Hypertension Mother      Hypertension Father      Heart disease Father  78    Diabetes Father      Diabetes Brother  1        type 1    Heart disease Brother      Hypertension Brother      Coronary artery disease Unknown      Hypertension Unknown      Diabetes Unknown       Social History[1]  Review of Systems   Constitutional:  Positive for chills and fever.   HENT:   Positive for sore throat.    Eyes:  Negative for visual disturbance.   Respiratory:  Negative for shortness of breath.    Cardiovascular:  Negative for chest pain and palpitations.   Gastrointestinal:  Negative for abdominal pain and vomiting.   Genitourinary:  Negative for dysuria.   Musculoskeletal:  Negative for joint swelling.   Neurological:  Negative for headaches.   Psychiatric/Behavioral:  Negative for confusion.        Physical Exam     Initial Vitals [03/19/25 1326]   BP Pulse Resp Temp SpO2   101/69 96 16 98.9 °F (37.2 °C) 96 %      MAP       --         Physical Exam    Nursing note and vitals reviewed.  Constitutional: She is not diaphoretic. No distress.   HENT:   Head: Normocephalic and atraumatic. Mouth/Throat: Oropharynx is clear and moist. No oropharyngeal exudate.   Eyes: Conjunctivae are normal.   Neck:   Normal range of motion.  Cardiovascular:  Normal rate.           Pulmonary/Chest: Breath sounds normal.   Left upper chest wall with port in place with no abnormality   Abdominal: Abdomen is soft. There is no abdominal tenderness.   Musculoskeletal:         General: Normal range of motion.      Cervical back: Normal range of motion.     Neurological: She is alert and oriented to person, place, and time. She has normal strength. No cranial nerve deficit or sensory deficit.   No gross deficits   Skin: No rash noted.   Psychiatric: She has a normal mood and affect.         ED Course   Procedures  Labs Reviewed   CBC W/ AUTO DIFFERENTIAL - Abnormal       Result Value    WBC 2.27 (*)     RBC 3.47 (*)     Hemoglobin 9.1 (*)     Hematocrit 28.6 (*)     MCV 82      MCH 26.2 (*)     MCHC 31.8 (*)     RDW 16.3 (*)     Platelets 155      MPV 9.7      Immature Granulocytes 0.9 (*)     Gran # (ANC) 1.0 (*)     Immature Grans (Abs) 0.02      Lymph # 0.5 (*)     Mono # 0.8      Eos # 0.0      Baso # 0.01      nRBC 1 (*)     Gran % 42.3      Lymph % 19.8      Mono % 36.6 (*)     Eosinophil % 0.0      Basophil %  0.4      Platelet Estimate Appears normal      Hypo Occasional      Differential Method Automated     COMPREHENSIVE METABOLIC PANEL - Abnormal    Sodium 131 (*)     Potassium 3.2 (*)     Chloride 94 (*)     CO2 29      Glucose 144 (*)     BUN 12      Creatinine 0.5      Calcium 8.2 (*)     Total Protein 5.9 (*)     Albumin 2.8 (*)     Total Bilirubin 0.7      Alkaline Phosphatase 128      AST 29      ALT 39      eGFR >60.0      Anion Gap 8     URINALYSIS, REFLEX TO URINE CULTURE - Abnormal    Specimen UA Urine, Clean Catch      Color, UA Yellow      Appearance, UA Clear      pH, UA 7.0      Specific Gravity, UA 1.020      Protein, UA 1+ (*)     Glucose, UA Negative      Ketones, UA 1+ (*)     Bilirubin (UA) Negative      Occult Blood UA Negative      Nitrite, UA Negative      Urobilinogen, UA 2.0-3.0 (*)     Leukocytes, UA Negative      Narrative:     Specimen Source->Urine   URINALYSIS MICROSCOPIC - Abnormal    RBC, UA 2      WBC, UA 4      Bacteria Rare      Yeast, UA Rare (*)     Squam Epithel, UA 5      Hyaline Casts, UA 0      Microscopic Comment SEE COMMENT      Narrative:     Specimen Source->Urine   GROUP A STREP, MOLECULAR    Group A Strep, Molecular Negative     CULTURE, BLOOD   CULTURE, BLOOD   INFLUENZA A AND B ANTIGEN    Influenza A, Molecular Negative      Influenza B, Molecular Negative      Flu A & B Source Nasal swab      Narrative:     Specimen Source->Nasopharyngeal Swab   SARS-COV-2 RNA AMPLIFICATION, QUAL    SARS-CoV-2 RNA, Amplification, Qual Negative     LACTIC ACID, PLASMA    Lactate (Lactic Acid) 0.8     ISTAT LACTATE    POC Lactate 1.44      Sample VENOUS          ECG Results              EKG 12-lead (In process)        Collection Time Result Time QRS Duration OHS QTC Calculation    03/19/25 15:03:24 03/19/25 15:12:18 142 516                     In process by Interface, Lab In [facility] (03/19/25 15:12:22)                   Narrative:    Test Reason : Z13.6,    Vent. Rate :  82 BPM     Atrial  Rate :  82 BPM     P-R Int : 158 ms          QRS Dur : 142 ms      QT Int : 442 ms       P-R-T Axes :  63 -60 100 degrees    QTcB Int : 516 ms    Normal sinus rhythm  Left axis deviation  Left bundle branch block  Abnormal ECG  When compared with ECG of 28-Jan-2025 10:12,  No significant change was found    Referred By: AAAREFERRAL SELF           Confirmed By:                                   Imaging Results              X-Ray Chest PA And Lateral (Final result)  Result time 03/19/25 14:30:16      Final result by Wallace Caballero DO (03/19/25 14:30:16)                   Impression:      No acute cardiopulmonary process.      Electronically signed by: Wallace Caballero  Date:    03/19/2025  Time:    14:30               Narrative:    EXAMINATION:  XR CHEST PA AND LATERAL    CLINICAL HISTORY:  Fever, unspecified    FINDINGS:  PA and lateral chest with comparison chest x-ray 01/30/2025.  Left internal jugular Port-A-Cath again noted..  Cardiomediastinal silhouette is within normal limits. Lungs are clear. Pulmonary vasculature is normal. No acute osseous abnormality.                                       Medications   aluminum-magnesium hydroxide-simethicone 200-200-20 mg/5 mL suspension 30 mL (has no administration in time range)     And   LIDOcaine viscous HCl 2% oral solution 15 mL (has no administration in time range)   famotidine (PF) injection 20 mg (has no administration in time range)   ceFEPIme injection 2 g (2 g Intravenous Given 3/19/25 1513)   potassium bicarbonate disintegrating tablet 25 mEq (25 mEq Oral Given 3/19/25 1656)   HYDROcodone-acetaminophen  mg per tablet 1 tablet (1 tablet Oral Given 3/19/25 1656)   aluminum-magnesium hydroxide-simethicone 200-200-20 mg/5 mL suspension 30 mL (30 mLs Oral Given 3/19/25 1705)     Medical Decision Making  Patient presents with fever after chemotherapy.  Diagnosis includes viral illness, febrile neutropenia, pneumonia, urinary tract infection.  Here CBC with  ANC of 1000.  Chemistry with potassium 3.2.  Urinalysis unremarkable.  Chest x-ray unremarkable.  EKG sinus rhythm with left bundle-branch block.  Here patient with temperature 101.6° at home.  Patient ANC is exactly a 1000.  She is right on the cusp of febrile neutropenia.  Broad-spectrum antibiotics initiated.  Blood culture ordered from port but nursing staff gave antibiotics before port access.  No definite etiology.  Discussed with Dr. Aparicio with Oncology.  He will see in consultation.  Sofia with Hospital Medicine to admit.    Amount and/or Complexity of Data Reviewed  Labs: ordered. Decision-making details documented in ED Course.  Radiology:  Decision-making details documented in ED Course.  ECG/medicine tests:  Decision-making details documented in ED Course.    Risk  OTC drugs.  Prescription drug management.                                      Clinical Impression:  Final diagnoses:  [Z13.6] Screening for cardiovascular condition  [R50.9] Fever  [D70.9, R50.81] Febrile neutropenia (Primary)          ED Disposition Condition    Admit Stable                    [1]   Social History  Tobacco Use    Smoking status: Former     Current packs/day: 0.00     Average packs/day: 0.3 packs/day for 12.0 years (3.0 ttl pk-yrs)     Types: Cigarettes     Start date: 1971     Quit date: 1983     Years since quittin.5    Smokeless tobacco: Never   Substance Use Topics    Alcohol use: Yes     Comment: occasional    Drug use: No        Zackery Rivas MD  [phone]

## 2025-03-20 PROBLEM — E44.1 MALNUTRITION OF MILD DEGREE: Status: ACTIVE | Noted: 2025-03-20

## 2025-03-20 PROBLEM — D61.818 PANCYTOPENIA: Status: ACTIVE | Noted: 2025-03-20

## 2025-03-20 LAB
ALBUMIN SERPL BCP-MCNC: 2.7 G/DL (ref 3.5–5.2)
ALP SERPL-CCNC: 112 U/L (ref 55–135)
ALT SERPL W/O P-5'-P-CCNC: 32 U/L (ref 10–44)
ANION GAP SERPL CALC-SCNC: 8 MMOL/L (ref 8–16)
ANISOCYTOSIS BLD QL SMEAR: SLIGHT
AST SERPL-CCNC: 22 U/L (ref 10–40)
BASOPHILS # BLD AUTO: 0.01 K/UL (ref 0–0.2)
BASOPHILS NFR BLD: 0.5 % (ref 0–1.9)
BILIRUB SERPL-MCNC: 0.5 MG/DL (ref 0.1–1)
BUN SERPL-MCNC: 11 MG/DL (ref 8–23)
CALCIUM SERPL-MCNC: 8.3 MG/DL (ref 8.7–10.5)
CHLORIDE SERPL-SCNC: 95 MMOL/L (ref 95–110)
CO2 SERPL-SCNC: 31 MMOL/L (ref 23–29)
CREAT SERPL-MCNC: 0.5 MG/DL (ref 0.5–1.4)
DIFFERENTIAL METHOD BLD: ABNORMAL
EOSINOPHIL # BLD AUTO: 0 K/UL (ref 0–0.5)
EOSINOPHIL NFR BLD: 0 % (ref 0–8)
ERYTHROCYTE [DISTWIDTH] IN BLOOD BY AUTOMATED COUNT: 16.1 % (ref 11.5–14.5)
EST. GFR  (NO RACE VARIABLE): >60 ML/MIN/1.73 M^2
GLUCOSE SERPL-MCNC: 115 MG/DL (ref 70–110)
HCT VFR BLD AUTO: 27.9 % (ref 37–48.5)
HGB BLD-MCNC: 8.6 G/DL (ref 12–16)
HYPOCHROMIA BLD QL SMEAR: ABNORMAL
IMM GRANULOCYTES # BLD AUTO: 0.04 K/UL (ref 0–0.04)
IMM GRANULOCYTES NFR BLD AUTO: 1.9 % (ref 0–0.5)
LACTATE SERPL-SCNC: 1.1 MMOL/L (ref 0.5–1.9)
LACTATE SERPL-SCNC: 1.2 MMOL/L (ref 0.5–1.9)
LYMPHOCYTES # BLD AUTO: 0.4 K/UL (ref 1–4.8)
LYMPHOCYTES NFR BLD: 20.6 % (ref 18–48)
MAGNESIUM SERPL-MCNC: 1.9 MG/DL (ref 1.6–2.6)
MCH RBC QN AUTO: 25.7 PG (ref 27–31)
MCHC RBC AUTO-ENTMCNC: 30.8 G/DL (ref 32–36)
MCV RBC AUTO: 84 FL (ref 82–98)
MONOCYTES # BLD AUTO: 0.6 K/UL (ref 0.3–1)
MONOCYTES NFR BLD: 28.5 % (ref 4–15)
NEUTROPHILS # BLD AUTO: 1 K/UL (ref 1.8–7.7)
NEUTROPHILS NFR BLD: 48.5 % (ref 38–73)
NRBC BLD-RTO: 1 /100 WBC
PLATELET # BLD AUTO: 137 K/UL (ref 150–450)
PLATELET BLD QL SMEAR: ABNORMAL
PMV BLD AUTO: 8.8 FL (ref 9.2–12.9)
POCT GLUCOSE: 124 MG/DL (ref 70–110)
POTASSIUM SERPL-SCNC: 3.1 MMOL/L (ref 3.5–5.1)
PROT SERPL-MCNC: 5.5 G/DL (ref 6–8.4)
RBC # BLD AUTO: 3.34 M/UL (ref 4–5.4)
SODIUM SERPL-SCNC: 134 MMOL/L (ref 136–145)
WBC # BLD AUTO: 2.14 K/UL (ref 3.9–12.7)

## 2025-03-20 PROCEDURE — 25000003 PHARM REV CODE 250: Performed by: NURSE PRACTITIONER

## 2025-03-20 PROCEDURE — 25000003 PHARM REV CODE 250: Performed by: HOSPITALIST

## 2025-03-20 PROCEDURE — 83735 ASSAY OF MAGNESIUM: CPT | Performed by: NURSE PRACTITIONER

## 2025-03-20 PROCEDURE — 99900035 HC TECH TIME PER 15 MIN (STAT)

## 2025-03-20 PROCEDURE — 83605 ASSAY OF LACTIC ACID: CPT | Performed by: HOSPITALIST

## 2025-03-20 PROCEDURE — 63600175 PHARM REV CODE 636 W HCPCS: Performed by: HOSPITALIST

## 2025-03-20 PROCEDURE — 12000002 HC ACUTE/MED SURGE SEMI-PRIVATE ROOM

## 2025-03-20 PROCEDURE — 94761 N-INVAS EAR/PLS OXIMETRY MLT: CPT

## 2025-03-20 PROCEDURE — 99900031 HC PATIENT EDUCATION (STAT)

## 2025-03-20 PROCEDURE — 63600175 PHARM REV CODE 636 W HCPCS: Performed by: NURSE PRACTITIONER

## 2025-03-20 PROCEDURE — 94799 UNLISTED PULMONARY SVC/PX: CPT

## 2025-03-20 PROCEDURE — 85025 COMPLETE CBC W/AUTO DIFF WBC: CPT | Performed by: NURSE PRACTITIONER

## 2025-03-20 PROCEDURE — 36415 COLL VENOUS BLD VENIPUNCTURE: CPT | Performed by: HOSPITALIST

## 2025-03-20 PROCEDURE — 80053 COMPREHEN METABOLIC PANEL: CPT | Performed by: NURSE PRACTITIONER

## 2025-03-20 PROCEDURE — 27000207 HC ISOLATION

## 2025-03-20 PROCEDURE — 36415 COLL VENOUS BLD VENIPUNCTURE: CPT | Performed by: NURSE PRACTITIONER

## 2025-03-20 RX ADMIN — THERA TABS 1 TABLET: TAB at 09:03

## 2025-03-20 RX ADMIN — ENOXAPARIN SODIUM 40 MG: 40 INJECTION SUBCUTANEOUS at 05:03

## 2025-03-20 RX ADMIN — HYDROCODONE BITARTRATE AND ACETAMINOPHEN 1 TABLET: 5; 325 TABLET ORAL at 01:03

## 2025-03-20 RX ADMIN — HYDROCODONE BITARTRATE AND ACETAMINOPHEN 1 TABLET: 10; 325 TABLET ORAL at 10:03

## 2025-03-20 RX ADMIN — FAMOTIDINE 20 MG: 20 TABLET, FILM COATED ORAL at 08:03

## 2025-03-20 RX ADMIN — MUPIROCIN 1 G: 20 OINTMENT TOPICAL at 08:03

## 2025-03-20 RX ADMIN — FAMOTIDINE 20 MG: 20 TABLET, FILM COATED ORAL at 09:03

## 2025-03-20 RX ADMIN — HYDROCODONE BITARTRATE AND ACETAMINOPHEN 1 TABLET: 10; 325 TABLET ORAL at 04:03

## 2025-03-20 RX ADMIN — PIPERACILLIN SODIUM AND TAZOBACTAM SODIUM 4.5 G: 4; .5 INJECTION, POWDER, LYOPHILIZED, FOR SOLUTION INTRAVENOUS at 09:03

## 2025-03-20 RX ADMIN — AZELASTINE HYDROCHLORIDE 274 MCG: 137 SPRAY, METERED NASAL at 10:03

## 2025-03-20 RX ADMIN — MUPIROCIN 2 G: 20 OINTMENT TOPICAL at 09:03

## 2025-03-20 RX ADMIN — POTASSIUM BICARBONATE 35 MEQ: 391 TABLET, EFFERVESCENT ORAL at 08:03

## 2025-03-20 RX ADMIN — CETIRIZINE HYDROCHLORIDE 10 MG: 10 TABLET, FILM COATED ORAL at 09:03

## 2025-03-20 RX ADMIN — LIDOCAINE HYDROCHLORIDE 15 ML: 20 SOLUTION ORAL at 10:03

## 2025-03-20 RX ADMIN — VANCOMYCIN HYDROCHLORIDE 1750 MG: 500 INJECTION, POWDER, LYOPHILIZED, FOR SOLUTION INTRAVENOUS at 05:03

## 2025-03-20 NOTE — PROGRESS NOTES
Automatic Inhaler to Nebulizer Interchange    albuterol (Ventolin, ProAir, or Proventil)  mcg given multiple times per day changed to albuterol 2.5 mg Q4H PRN  per [facility] Automatic Therapeutic Substitutions Protocol.    Please contact pharmacy at extension [phone] with any questions.     Thank you,   Paramjit Wong

## 2025-03-20 NOTE — ASSESSMENT & PLAN NOTE
Noted  Continue Zofran p.r.n. nausea   Continue Ativan  may continue scopolamine if patient with excessive oral secretions

## 2025-03-20 NOTE — PROGRESS NOTES
[facility] - Emergency Dept  Hospital Medicine  Progress Note    Patient Name: Kwasi Brown  MRN: [id number]  Patient Class: IP- Inpatient   Admission Date: 3/19/2025  Length of Stay: 1 days  Attending Physician: Real Leon MD  Primary Care Provider: Sierra, Primary Doctor        Subjective     Principal Problem:Febrile neutropenia        HPI:  59-year-old female with pMHx of esophageal cancer who presented to the ED with complaints elevated temperature of 101.7°.  Patient's spoke with her oncologists and P who had recommended that she come to the ED for evaluation.  Patient reports her last chemotherapy was 9 days prior.  Patient reports that she has severe pain with swallowing she finds that sometimes Maalox helps with the pain in her esophagus after eating.  Discussed starting patient on GI cocktail to help with pain of her oral potassium supplements.  Patient will be placed on airborne and contact an enhanced respiratory precautions due to her neutropenia.  Patient is borderline with an ANC of 1000.    Triage vitals with pulse 96, /69, temp 98.9°, SpO2 96% on room air.  Blood work with sodium 131, potassium 3.2, chloride 94, glucose 144, calcium 8.2, total protein 5.9, albumin 2.8, WBC 2.2, RBC 3.4, hemoglobin 9.1, hematocrit 28.6, urine negative for infection.  Blood cultures drawn, group a strep negative, chest x-ray with no acute cardiopulmonary process.    Overview/Hospital Course:  No notes on file    Interval History:  Patient seen and examined.  Overnight, blood pressure remained stable.  Patient complains of pain at the PEG tube site.  Plan of care discussed with the patient at the bedside in detail.    Review of Systems  Objective:     Vital Signs (Most Recent):  Temp: 98.9 °F (37.2 °C) (03/19/25 1530)  Pulse: 76 (03/20/25 1319)  Resp: 17 (03/20/25 1644)  BP: 128/69 (03/20/25 1334)  SpO2: 97 % (03/20/25 1319) Vital Signs (24h Range):  Pulse:  [76-84] 76  Resp:  [13-23] 17  SpO2:   [95 %-99 %] 97 %  BP: ()/(60-90) 128/69     Weight: 82.6 kg (182 lb)  Body mass index is 28.51 kg/m².  No intake or output data in the 24 hours ending 03/20/25 1647      Physical Exam  Vitals and nursing note reviewed.   Constitutional:       General: She is not in acute distress.     Appearance: She is ill-appearing.   HENT:      Head:      Comments: Alopecia noted  Eyes:      Pupils: Pupils are equal, round, and reactive to light.   Cardiovascular:      Rate and Rhythm: Normal rate and regular rhythm.      Heart sounds: No murmur heard.  Pulmonary:      Effort: Pulmonary effort is normal.      Breath sounds: Normal breath sounds.   Abdominal:      General: There is no distension.      Palpations: Abdomen is soft.      Tenderness: There is no abdominal tenderness.      Comments: Percutaneous gastrostomy in place, no erythema or tenderness noted.   Skin:     Coloration: Skin is pale.      Findings: No rash.   Neurological:      Mental Status: She is alert and oriented to person, place, and time.               Significant Labs: All pertinent labs within the past 24 hours have been reviewed.    Significant Imaging: I have reviewed all pertinent imaging results/findings within the past 24 hours.      Assessment & Plan  Febrile neutropenia  This patient does have evidence of infective focus  My overall impression is sepsis.  Source: Unknown  Antibiotics given-   Antibiotics (72h ago, onward)      Start     Stop Route Frequency Ordered    03/21/25 0600  vancomycin 1,500 mg in 0.9% NaCl 250 mL IVPB (admixture device)         -- IV Every 12 hours (non-standard times) 03/20/25 1629    03/20/25 1800  vancomycin (VANCOCIN) 1,750 mg in 0.9% NaCl 500 mL IVPB         -- IV Once 03/20/25 1629    03/20/25 1730  piperacillin-tazobactam (ZOSYN) 4.5 g in D5W 100 mL IVPB (MB+)  (Sepsis Treatment Panel)         03/26/25 1729 IV Every 8 hours (non-standard times) 03/20/25 1620    03/20/25 1720  vancomycin - pharmacy to dose   "(Sepsis Treatment Panel)        Placed in "And" Linked Group    -- IV pharmacy to manage frequency 03/20/25 1620    03/19/25 2300  mupirocin 2 % ointment         03/24/25 2059 Nasl 2 times daily 03/19/25 2148          Latest lactate reviewed-  Recent Labs   Lab 03/19/25  1405 03/19/25  1701   LACTATE  --  0.8   POCLAC 1.44  --        Fluid challenge Fluid Not Needed - Patient is not hypotensive and/or lactate is less than 4.0.     Post- resuscitation assessment No - Post resuscitation assessment not needed         Pancytopenia  This patient is found to have pancytopenia, the likely etiology is Drug induced (including chemotherapy) related to esophageal carcinoma , will monitor CBC Daily. Will transfuse red blood cells if the hemoglobin is <7g/dL (or <8 in the setting of ACS). Will transfuse platelets if platelet count is less than 10,000. Hold DVT prophylaxis if platelets are <50k. The patient's hemoglobin, white blood cell count, and platelet count results have been reviewed and are listed below.  Recent Labs   Lab 03/20/25  0753   HGB 8.6*   WBC 2.14*   *           Dyslipidemia  Patient is on fish oil at home we will resume on discharge  Malignant neoplasm of abdominal esophagus  Noted  Continue Zofran p.r.n. nausea   Continue Ativan  may continue scopolamine if patient with excessive oral secretions    Hypokalemia  Patient's most recent potassium results are listed below.   Recent Labs     03/19/25  1514 03/20/25  0753   K 3.2* 3.1*     Plan  - Replete potassium per protocol  - Monitor potassium Daily  - Patient's hypokalemia is improving  - please give patient GI cocktail prior to giving potassium supplements due to pain    Anemia  Anemia is likely due to chronic disease due to Malignancy. Most recent hemoglobin and hematocrit are listed below.  Recent Labs     03/19/25  1514 03/20/25  0753   HGB 9.1* 8.6*   HCT 28.6* 27.9*     Plan  - Monitor serial CBC: Daily  - Transfuse PRBC if patient becomes " hemodynamically unstable, symptomatic or H/H drops below 7/21.  - Patient has not received any PRBC transfusions to date  - Patient's anemia is currently worsening. Will monitor    Hyponatremia  Hyponatremia is likely due to Dehydration/hypovolemia. The patient's most recent sodium results are listed below.  Recent Labs     03/19/25  1514 03/20/25  0753   * 134*     Plan  - Correct the sodium by 4-6mEq in 24 hours.   - Monitor sodium Daily.   - Patient hyponatremia is stable  - continue to monitor.    Malnutrition of mild degree  Nutrition consulted. Most recent weight and BMI monitored-     Measurements:  Wt Readings from Last 1 Encounters:   03/19/25 82.6 kg (182 lb)   Body mass index is 28.51 kg/m².    Patient has been screened and assessed by RD.    Malnutrition Type:  Context:    Level:      Malnutrition Characteristic Summary:       Interventions/Recommendations (treatment strategy):         VTE Risk Mitigation (From admission, onward)           Ordered     enoxaparin injection 40 mg  Daily         03/19/25 2146     IP VTE HIGH RISK PATIENT  Once         03/19/25 2146     Place sequential compression device  Until discontinued         03/19/25 2146                    Discharge Planning   CATHY:      Code Status: Full Code   Medical Readiness for Discharge Date:   Discharge Plan A: Home with family                        Real Leon MD  Department of Hospital Medicine   [facility] - Emergency Dept

## 2025-03-20 NOTE — ASSESSMENT & PLAN NOTE
This patient is found to have pancytopenia, the likely etiology is Drug induced (including chemotherapy) related to esophageal carcinoma, will monitor CBC Daily. Will transfuse red blood cells if the hemoglobin is <7g/dL (or <8 in the setting of ACS). Will transfuse platelets if platelet count is less than 10,000. Hold DVT prophylaxis if platelets are <50k. The patient's hemoglobin, white blood cell count, and platelet count results have been reviewed and are listed below.  Recent Labs   Lab 03/20/25  0753   HGB 8.6*   WBC 2.14*   *

## 2025-03-20 NOTE — ASSESSMENT & PLAN NOTE
Patient's most recent potassium results are listed below.   Recent Labs     03/19/25  1514   K 3.2*     Plan  - Replete potassium per protocol  - Monitor potassium Daily  - Patient's hypokalemia is improving  - please give patient GI cocktail prior to giving potassium supplements due to pain

## 2025-03-20 NOTE — HPI
59-year-old female with pMHx of esophageal cancer who presented to the ED with complaints elevated temperature of 101.7°.  Patient's spoke with her oncologists and P who had recommended that she come to the ED for evaluation.  Patient reports her last chemotherapy was 9 days prior.  Patient reports that she has severe pain with swallowing she finds that sometimes Maalox helps with the pain in her esophagus after eating.  Discussed starting patient on GI cocktail to help with pain of her oral potassium supplements.  Patient will be placed on airborne and contact an enhanced respiratory precautions due to her neutropenia.  Patient is borderline with an ANC of 1000.    Triage vitals with pulse 96, /69, temp 98.9°, SpO2 96% on room air.  Blood work with sodium 131, potassium 3.2, chloride 94, glucose 144, calcium 8.2, total protein 5.9, albumin 2.8, WBC 2.2, RBC 3.4, hemoglobin 9.1, hematocrit 28.6, urine negative for infection.  Blood cultures drawn, group a strep negative, chest x-ray with no acute cardiopulmonary process.

## 2025-03-20 NOTE — ASSESSMENT & PLAN NOTE
Anemia is likely due to chronic disease due to Malignancy. Most recent hemoglobin and hematocrit are listed below.  Recent Labs     03/19/25  1514   HGB 9.1*   HCT 28.6*     Plan  - Monitor serial CBC: Daily  - Transfuse PRBC if patient becomes hemodynamically unstable, symptomatic or H/H drops below 7/21.  - Patient has not received any PRBC transfusions to date  - Patient's anemia is currently worsening. Will monitor

## 2025-03-20 NOTE — ASSESSMENT & PLAN NOTE
Anemia is likely due to chronic disease due to Malignancy. Most recent hemoglobin and hematocrit are listed below.  Recent Labs     03/19/25  1514 03/20/25  0753   HGB 9.1* 8.6*   HCT 28.6* 27.9*     Plan  - Monitor serial CBC: Daily  - Transfuse PRBC if patient becomes hemodynamically unstable, symptomatic or H/H drops below 7/21.  - Patient has not received any PRBC transfusions to date  - Patient's anemia is currently worsening. Will monitor

## 2025-03-20 NOTE — PROGRESS NOTES
"Pharmacokinetic Initial Assessment: IV Vancomycin    Assessment/Plan:    Initiate intravenous vancomycin with loading dose of 1750 mg once followed by a maintenance dose of vancomycin 1500 mg IV every 12 hours  Desired empiric serum trough concentration is 15 to 20 mcg/mL  Draw vancomycin trough level 60 min prior to fourth dose on 3/22 at approximately 0500  Pharmacy will continue to follow and monitor vancomycin.      Please contact pharmacy at extension [phone] with any questions regarding this assessment.     Thank you for the consult,   Vandana Clemons       Patient brief summary:  Kwasi Brown is a 59 y.o. female initiated on antimicrobial therapy with IV Vancomycin for treatment of suspected sepsis    Drug Allergies:   Review of patient's allergies indicates:  No Known Allergies    Actual Body Weight:   82.6 kg    Renal Function:   Estimated Creatinine Clearance: 130.6 mL/min (based on SCr of 0.5 mg/dL).,     Dialysis Method (if applicable):  N/A    CBC (last 72 hours):  Recent Labs   Lab Result Units 03/19/25  1514 03/20/25  0753   WBC K/uL 2.27* 2.14*   Hemoglobin g/dL 9.1* 8.6*   Hematocrit % 28.6* 27.9*   Platelets K/uL 155 137*   Gran % % 42.3 48.5   Lymph % % 19.8 20.6   Mono % % 36.6* 28.5*   Eosinophil % % 0.0 0.0   Basophil % % 0.4 0.5   Differential Method  Automated Automated       Metabolic Panel (last 72 hours):  Recent Labs   Lab Result Units 03/19/25  1320 03/19/25  1514 03/20/25  0753   Sodium mmol/L  --  131* 134*   Potassium mmol/L  --  3.2* 3.1*   Chloride mmol/L  --  94* 95   CO2 mmol/L  --  29 31*   Glucose mg/dL  --  144* 115*   Glucose, UA  Negative  --   --    BUN mg/dL  --  12 11   Creatinine mg/dL  --  0.5 0.5   Albumin g/dL  --  2.8* 2.7*   Total Bilirubin mg/dL  --  0.7 0.5   Alkaline Phosphatase U/L  --  128 112   AST U/L  --  29 22   ALT U/L  --  39 32   Magnesium mg/dL  --   --  1.9       Drug levels (last 3 results):  No results for input(s): "VANCOMYCINRA", "VANCORANDOM", " ""VANCOMYCINPE", "VANCOPEAK", "VANCOMYCINTR", "VANCOTROUGH" in the last 72 hours.    Microbiologic Results:  Microbiology Results (last 7 days)       Procedure Component Value Units Date/Time    Blood culture x two cultures. Draw prior to antibiotics. [[id number]] Collected: 03/19/25 1401    Order Status: Completed Specimen: Blood from Peripheral, Antecubital, Right Updated: 03/19/25 2117     Blood Culture, Routine No Growth to date    Narrative:      Aerobic and anaerobic    Blood culture x two cultures. Draw prior to antibiotics. [[id number]] Collected: 03/19/25 1406    Order Status: Completed Specimen: Blood from Peripheral, Antecubital, Left Updated: 03/19/25 2117     Blood Culture, Routine No Growth to date    Narrative:      Aerobic and anaerobic    Group A Strep, Molecular [[id number]] Collected: 03/19/25 1527    Order Status: Completed Specimen: Throat Updated: 03/19/25 1557     Group A Strep, Molecular Negative     Comment: Arcanobacterium haemolyticum and Beta Streptococcus group C   and G will not be detected by this test method.  Please order   Throat Culture ([id number]) if suspected.         Blood culture [[id number]]     Order Status: Canceled Specimen: Blood     Respiratory Infection Panel (PCR), Nasopharyngeal [[id number]]     Order Status: Canceled Specimen: Nasopharyngeal Swab             "

## 2025-03-20 NOTE — SUBJECTIVE & OBJECTIVE
Past Medical History:   Diagnosis Date    Esophageal cancer 01/04/2025    Hypertension 2009    Obesity        Past Surgical History:   Procedure Laterality Date    ADENOIDECTOMY      COLONOSCOPY W/ POLYPECTOMY  1989    Age 24 - polyps negative    HYSTERECTOMY  2009    Partial Hysterectomy    INSERTION OF TUNNELED CENTRAL VENOUS CATHETER (CVC) WITH SUBCUTANEOUS PORT Left 1/30/2025    Procedure: [id number];  Surgeon: Patrick Whelan Jr., MD;  Location: [facility] OR;  Service: General;  Laterality: Left;    INSERTION, GASTROSTOMY TUBE, LAPAROSCOPIC N/A 1/30/2025    Procedure: INSERTION, GASTROSTOMY TUBE, LAPAROSCOPIC;  Surgeon: Patrick Whelan Jr., MD;  Location: [facility] OR;  Service: General;  Laterality: N/A;    KNEE RECONSTRUCTION, MEDIAL PATELLAR FEMORAL LIGAMENT Bilateral     left ALSO RIGHT KNEE SCOPE    TONSILLECTOMY         Review of patient's allergies indicates:  No Known Allergies    No current facility-administered medications on file prior to encounter.     Current Outpatient Medications on File Prior to Encounter   Medication Sig    albuterol sulfate (PROAIR DIGIHALER) 90 mcg/actuation aebs Inhale 2 puffs every 4 hours by inhalation route.    aluminum & magnesium hydroxide-simethicone (MYLANTA MAX STRENGTH) 400-400-40 mg/5 mL suspension Take 10 mLs by mouth every 6 (six) hours as needed for Indigestion.    azelastine (ASTELIN) 137 mcg (0.1 %) nasal spray 2 sprays by Nasal route 2 (two) times daily.    benazepril-hydrochlorthiazide (LOTENSIN HCT) 20-12.5 mg per tablet Take 1 tablet by mouth once daily.    famotidine (PEPCID) 40 MG tablet Take 2 tablets (80 mg total) by mouth every evening.    fluticasone propionate (FLONASE) 50 mcg/actuation nasal spray 1 spray by Each Nostril route once daily.    HYDROcodone-acetaminophen (NORCO)  mg per tablet Take 1 tablet by mouth every 6 (six) hours as needed for Pain.    ibuprofen (ADVIL,MOTRIN) 200 MG tablet Take 600 mg by mouth every 6 (six)  hours as needed for Pain.    LIDOcaine-prilocaine (EMLA) cream Apply topically as needed (Apply to port site 30 minutes before access as needed). (Patient taking differently: Apply 1 g topically as needed (Apply to port site 30 minutes before access as needed).)    LORazepam (ATIVAN) 0.5 MG tablet Take 1 tablet (0.5 mg total) by mouth every 6 (six) hours as needed for Anxiety (Nausea and vomiting).    multivitamin capsule Take 1 capsule by mouth once daily.    OLANZapine (ZYPREXA) 5 MG tablet Take 1 tablet the night of chemotherapy and for the following 3 nights.    ondansetron (ZOFRAN) 8 MG tablet Take 1 tablet (8 mg total) by mouth every 12 (twelve) hours as needed for Nausea.    ondansetron (ZOFRAN-ODT) 8 MG TbDL Take 1 tablet (8 mg total) by mouth every 8 (eight) hours as needed (nausea/vomiting).    pantoprazole (PROTONIX) 20 MG tablet Take 1 tablet (20 mg total) by mouth once daily.    promethazine (PHENERGAN) 25 MG tablet Take 1 tablet (25 mg total) by mouth every 6 (six) hours as needed for Nausea.    cetirizine (ZYRTEC) 10 MG tablet Take 10 mg by mouth once daily.    dexAMETHasone (DECADRON) 4 MG Tab Take take 2 tablets (8 mg) by mouth twice daily the day before chemotherapy and the day following chemotherapy, then take 2 tablets (8 mg) by mouth on day 3 of each chemotherapy cycle.    lysine 1,000 mg Tab Take 1 tablet by mouth Daily.    scopolamine (TRANSDERM-SCOP) 1.3-1.5 mg (1 mg over 3 days) Place 1 patch onto the skin every 72 hours.    [DISCONTINUED] albuterol (PROVENTIL/VENTOLIN HFA) 90 mcg/actuation inhaler INHALE 2 PUFFS EVERY 4 HOURS BY INHALATION ROUTE.    [DISCONTINUED] omega 3-dha-epa-fish oil 60- mg CpDR Take 1 capsule by mouth Daily.     Family History       Problem Relation (Age of Onset)    COPD Mother    Coronary artery disease     Diabetes Father, Brother (1),     Heart disease Mother (70), Father (78), Brother    Hypertension Mother, Father, Brother,     Tuberculosis Mother           Tobacco Use    Smoking status: Former     Current packs/day: 0.00     Average packs/day: 0.3 packs/day for 12.0 years (3.0 ttl pk-yrs)     Types: Cigarettes     Start date: 1971     Quit date: 1983     Years since quittin.5    Smokeless tobacco: Never   Substance and Sexual Activity    Alcohol use: Yes     Comment: occasional    Drug use: No    Sexual activity: Not on file     Review of Systems   Constitutional:  Positive for fever.   HENT:  Positive for sore throat and trouble swallowing (Pain with swallowing).      Objective:     Vital Signs (Most Recent):  Temp: 98.9 °F (37.2 °C) (25 1326)  Pulse: 82 (25)  Resp: 16 (25)  BP: 103/60 (25)  SpO2: 96 % (25) Vital Signs (24h Range):  Temp:  [98.9 °F (37.2 °C)] 98.9 °F (37.2 °C)  Pulse:  [80-96] 82  Resp:  [16-31] 16  SpO2:  [96 %-100 %] 96 %  BP: (101-119)/(60-69) 103/60     Weight: 82.6 kg (182 lb)  Body mass index is 28.51 kg/m².     Physical Exam  Vitals reviewed.   Constitutional:       General: She is not in acute distress.     Appearance: Normal appearance. She is ill-appearing. She is not toxic-appearing.   HENT:      Head: Normocephalic and atraumatic.      Mouth/Throat:      Mouth: Mucous membranes are moist.      Pharynx: Oropharynx is clear.   Eyes:      Extraocular Movements: Extraocular movements intact.      Pupils: Pupils are equal, round, and reactive to light.   Cardiovascular:      Rate and Rhythm: Normal rate and regular rhythm.      Pulses: Normal pulses.      Heart sounds: Normal heart sounds.   Pulmonary:      Effort: Pulmonary effort is normal.      Breath sounds: Normal breath sounds.   Chest:          Comments: Port present  Abdominal:      General: Bowel sounds are normal. There is no distension.      Palpations: Abdomen is soft.      Tenderness: There is abdominal tenderness (around feeding tube).          Comments: feeding tube present   Musculoskeletal:         General: No  swelling. Normal range of motion.      Cervical back: Normal range of motion and neck supple.   Skin:     General: Skin is warm and dry.      Capillary Refill: Capillary refill takes less than 2 seconds.   Neurological:      General: No focal deficit present.      Mental Status: She is alert and oriented to person, place, and time. Mental status is at baseline.   Psychiatric:         Mood and Affect: Mood normal.         Behavior: Behavior normal.         Judgment: Judgment normal.              CRANIAL NERVES     CN III, IV, VI   Pupils are equal, round, and reactive to light.       Significant Labs: All pertinent labs within the past 24 hours have been reviewed.  Recent Lab Results  (Last 5 results in the past 24 hours)        03/19/25  1701   03/19/25  1527   03/19/25  1526   03/19/25  1514   03/19/25  1406        Influenza A, Molecular     Negative           Influenza B, Molecular     Negative           Group A Strep, Molecular   Negative  Comment: Arcanobacterium haemolyticum and Beta Streptococcus group C   and G will not be detected by this test method.  Please order   Throat Culture ([id number]) if suspected.               Albumin       2.8         ALP       128         ALT       39         Anion Gap       8         AST       29         Baso #       0.01         Basophil %       0.4         BILIRUBIN TOTAL       0.7  Comment: For infants and newborns, interpretation of results should be based  on gestational age, weight and in agreement with clinical  observations.    Premature Infant recommended reference ranges:  Up to 24 hours.............<8.0 mg/dL  Up to 48 hours............<12.0 mg/dL  3-5 days..................<15.0 mg/dL  6-29 days.................<15.0 mg/dL           Blood Culture, Routine         No Growth to date  [P]       BUN       12         Calcium       8.2         Chloride       94         CO2       29         Creatinine       0.5         Differential Method       Automated         eGFR        >60.0         Eos #       0.0         Eos %       0.0         Flu A & B Source     Nasal swab           Glucose       144         Gran # (ANC)       1.0         Gran %       42.3         Hematocrit       28.6         Hemoglobin       9.1         Hypo       Occasional         Immature Grans (Abs)       0.02  Comment: Mild elevation in immature granulocytes is non specific and   can be seen in a variety of conditions including stress response,   acute inflammation, trauma and pregnancy. Correlation with other   laboratory and clinical findings is essential.           Immature Granulocytes       0.9         Lactic Acid Level 0.8  Comment: Falsely low lactic acid results can be found in samples   containing >=13.0 mg/dL total bilirubin and/or >=3.5 mg/dL   direct bilirubin.                 Lymph #       0.5         Lymph %       19.8         MCH       26.2         MCHC       31.8         MCV       82         Mono #       0.8         Mono %       36.6         MPV       9.7         nRBC       1         Platelet Estimate       Appears normal         Platelet Count       155         POC Lactate               Potassium       3.2         PROTEIN TOTAL       5.9         RBC       3.47         RDW       16.3         Sample               SARS-CoV-2 RNA, Amplification, Qual   Negative  Comment: This test utilizes isothermal nucleic acid amplification technology   to   detect the SARS-CoV-2 RdRp nucleic acid segment. The analytical   sensitivity   (limit of detection) is 500 copies/swab.     A POSITIVE result is indicative of the presence of SARS-CoV-2 RNA;   clinical   correlation with patient history and other diagnostic information is   necessary to determine patient infection status.    A NEGATIVE result means that SARS-CoV-2 nucleic acids are not present   above   the limit of detection. A NEGATIVE result should be treated as   presumptive.   It does not rule out the possibility of COVID-19 and should not be   the sole    basis for treatment decisions. If COVID-19 is strongly suspected   based on   clinical and exposure history, re-testing using an alternate   molecular assay   should be considered.     This test is FDA approved.Performance characteristics of this test   has been   independently verified by [facility] in conjunction   with   [facility] Department of Pathology and Laboratory   Medicine.               Sodium       131         WBC       2.27                                 [P] - Preliminary Result               Significant Imaging: I have reviewed all pertinent imaging results/findings within the past 24 hours.    X-Ray Chest PA And Lateral  Narrative: EXAMINATION:  XR CHEST PA AND LATERAL    CLINICAL HISTORY:  Fever, unspecified    FINDINGS:  PA and lateral chest with comparison chest x-ray 01/30/2025.  Left internal jugular Port-A-Cath again noted..  Cardiomediastinal silhouette is within normal limits. Lungs are clear. Pulmonary vasculature is normal. No acute osseous abnormality.  Impression: No acute cardiopulmonary process.    Electronically signed by: Wallace Caballero  Date:    03/19/2025  Time:    14:30

## 2025-03-20 NOTE — ASSESSMENT & PLAN NOTE
"This patient does have evidence of infective focus  My overall impression is sepsis.  Source: Unknown  Antibiotics given-   Antibiotics (72h ago, onward)      Start     Stop Route Frequency Ordered    03/21/25 0600  vancomycin 1,500 mg in 0.9% NaCl 250 mL IVPB (admixture device)         -- IV Every 12 hours (non-standard times) 03/20/25 1629    03/20/25 1800  vancomycin (VANCOCIN) 1,750 mg in 0.9% NaCl 500 mL IVPB         -- IV Once 03/20/25 1629    03/20/25 1730  piperacillin-tazobactam (ZOSYN) 4.5 g in D5W 100 mL IVPB (MB+)  (Sepsis Treatment Panel)         03/26/25 1729 IV Every 8 hours (non-standard times) 03/20/25 1620    03/20/25 1720  vancomycin - pharmacy to dose  (Sepsis Treatment Panel)        Placed in "And" Linked Group    -- IV pharmacy to manage frequency 03/20/25 1620    03/19/25 2300  mupirocin 2 % ointment         03/24/25 2059 Nasl 2 times daily 03/19/25 2148          Latest lactate reviewed-  Recent Labs   Lab 03/19/25  1405 03/19/25  1701   LACTATE  --  0.8   POCLAC 1.44  --        Fluid challenge Fluid Not Needed - Patient is not hypotensive and/or lactate is less than 4.0.     Post- resuscitation assessment No - Post resuscitation assessment not needed         "

## 2025-03-20 NOTE — ASSESSMENT & PLAN NOTE
Hyponatremia is likely due to Dehydration/hypovolemia. The patient's most recent sodium results are listed below.  Recent Labs     03/19/25  1514 03/20/25  0753   * 134*     Plan  - Correct the sodium by 4-6mEq in 24 hours.   - Monitor sodium Daily.   - Patient hyponatremia is stable  - continue to monitor.

## 2025-03-20 NOTE — SUBJECTIVE & OBJECTIVE
Interval History:  Patient seen and examined.  Overnight, blood pressure remained stable.  Patient complains of pain at the PEG tube site.  Plan of care discussed with the patient at the bedside in detail.    Review of Systems  Objective:     Vital Signs (Most Recent):  Temp: 98.9 °F (37.2 °C) (03/19/25 1530)  Pulse: 76 (03/20/25 1319)  Resp: 17 (03/20/25 1644)  BP: 128/69 (03/20/25 1334)  SpO2: 97 % (03/20/25 1319) Vital Signs (24h Range):  Pulse:  [76-84] 76  Resp:  [13-23] 17  SpO2:  [95 %-99 %] 97 %  BP: ()/(60-90) 128/69     Weight: 82.6 kg (182 lb)  Body mass index is 28.51 kg/m².  No intake or output data in the 24 hours ending 03/20/25 1647      Physical Exam  Vitals and nursing note reviewed.   Constitutional:       General: She is not in acute distress.     Appearance: She is ill-appearing.   HENT:      Head:      Comments: Alopecia noted  Eyes:      Pupils: Pupils are equal, round, and reactive to light.   Cardiovascular:      Rate and Rhythm: Normal rate and regular rhythm.      Heart sounds: No murmur heard.  Pulmonary:      Effort: Pulmonary effort is normal.      Breath sounds: Normal breath sounds.   Abdominal:      General: There is no distension.      Palpations: Abdomen is soft.      Tenderness: There is no abdominal tenderness.      Comments: Percutaneous gastrostomy in place, no erythema or tenderness noted.   Skin:     Coloration: Skin is pale.      Findings: No rash.   Neurological:      Mental Status: She is alert and oriented to person, place, and time.               Significant Labs: All pertinent labs within the past 24 hours have been reviewed.    Significant Imaging: I have reviewed all pertinent imaging results/findings within the past 24 hours.

## 2025-03-20 NOTE — ASSESSMENT & PLAN NOTE
Patient's most recent potassium results are listed below.   Recent Labs     03/19/25  1514 03/20/25  0753   K 3.2* 3.1*     Plan  - Replete potassium per protocol  - Monitor potassium Daily  - Patient's hypokalemia is improving  - please give patient GI cocktail prior to giving potassium supplements due to pain

## 2025-03-20 NOTE — ASSESSMENT & PLAN NOTE
Nutrition consulted. Most recent weight and BMI monitored-     Measurements:  Wt Readings from Last 1 Encounters:   03/19/25 82.6 kg (182 lb)   Body mass index is 28.51 kg/m².    Patient has been screened and assessed by RD.    Malnutrition Type:  Context:    Level:      Malnutrition Characteristic Summary:       Interventions/Recommendations (treatment strategy):

## 2025-03-20 NOTE — H&P
[facility] - Emergency Dept  Hospital Medicine  History & Physical    Patient Name: Kwasi Brown  MRN: [id number]  Patient Class: IP- Inpatient  Admission Date: 3/19/2025  Attending Physician: Sierra att. providers found   Primary Care Provider: No, Primary Doctor         Patient information was obtained from patient, spouse/SO, and ER records.     Subjective:     Principal Problem:Febrile neutropenia    Chief Complaint:   Chief Complaint   Patient presents with    Fever Post Chemo     9 days ago received chemo for esophageal cancer, 101.6 at home today and took norco . Oncology sent here for concerns of sepsis     Chills        HPI: 59-year-old female with pMHx of esophageal cancer who presented to the ED with complaints elevated temperature of 101.7°.  Patient's spoke with her oncologists and P who had recommended that she come to the ED for evaluation.  Patient reports her last chemotherapy was 9 days prior.  Patient reports that she has severe pain with swallowing she finds that sometimes Maalox helps with the pain in her esophagus after eating.  Discussed starting patient on GI cocktail to help with pain of her oral potassium supplements.  Patient will be placed on airborne and contact an enhanced respiratory precautions due to her neutropenia.  Patient is borderline with an ANC of 1000.    Triage vitals with pulse 96, /69, temp 98.9°, SpO2 96% on room air.  Blood work with sodium 131, potassium 3.2, chloride 94, glucose 144, calcium 8.2, total protein 5.9, albumin 2.8, WBC 2.2, RBC 3.4, hemoglobin 9.1, hematocrit 28.6, urine negative for infection.  Blood cultures drawn, group a strep negative, chest x-ray with no acute cardiopulmonary process.    Past Medical History:   Diagnosis Date    Esophageal cancer 01/04/2025    Hypertension 2009    Obesity        Past Surgical History:   Procedure Laterality Date    ADENOIDECTOMY      COLONOSCOPY W/ POLYPECTOMY  1989    Age 24 - polyps  negative    HYSTERECTOMY  2009    Partial Hysterectomy    INSERTION OF TUNNELED CENTRAL VENOUS CATHETER (CVC) WITH SUBCUTANEOUS PORT Left 1/30/2025    Procedure: [id number];  Surgeon: Patrick Whelan Jr., MD;  Location: [facility] OR;  Service: General;  Laterality: Left;    INSERTION, GASTROSTOMY TUBE, LAPAROSCOPIC N/A 1/30/2025    Procedure: INSERTION, GASTROSTOMY TUBE, LAPAROSCOPIC;  Surgeon: Patrick Whelan Jr., MD;  Location: [facility] OR;  Service: General;  Laterality: N/A;    KNEE RECONSTRUCTION, MEDIAL PATELLAR FEMORAL LIGAMENT Bilateral     left ALSO RIGHT KNEE SCOPE    TONSILLECTOMY         Review of patient's allergies indicates:  No Known Allergies    No current facility-administered medications on file prior to encounter.     Current Outpatient Medications on File Prior to Encounter   Medication Sig    albuterol sulfate (PROAIR DIGIHALER) 90 mcg/actuation aebs Inhale 2 puffs every 4 hours by inhalation route.    aluminum & magnesium hydroxide-simethicone (MYLANTA MAX STRENGTH) 400-400-40 mg/5 mL suspension Take 10 mLs by mouth every 6 (six) hours as needed for Indigestion.    azelastine (ASTELIN) 137 mcg (0.1 %) nasal spray 2 sprays by Nasal route 2 (two) times daily.    benazepril-hydrochlorthiazide (LOTENSIN HCT) 20-12.5 mg per tablet Take 1 tablet by mouth once daily.    famotidine (PEPCID) 40 MG tablet Take 2 tablets (80 mg total) by mouth every evening.    fluticasone propionate (FLONASE) 50 mcg/actuation nasal spray 1 spray by Each Nostril route once daily.    HYDROcodone-acetaminophen (NORCO)  mg per tablet Take 1 tablet by mouth every 6 (six) hours as needed for Pain.    ibuprofen (ADVIL,MOTRIN) 200 MG tablet Take 600 mg by mouth every 6 (six) hours as needed for Pain.    LIDOcaine-prilocaine (EMLA) cream Apply topically as needed (Apply to port site 30 minutes before access as needed). (Patient taking differently: Apply 1 g topically as needed (Apply to port site 30 minutes  before access as needed).)    LORazepam (ATIVAN) 0.5 MG tablet Take 1 tablet (0.5 mg total) by mouth every 6 (six) hours as needed for Anxiety (Nausea and vomiting).    multivitamin capsule Take 1 capsule by mouth once daily.    OLANZapine (ZYPREXA) 5 MG tablet Take 1 tablet the night of chemotherapy and for the following 3 nights.    ondansetron (ZOFRAN) 8 MG tablet Take 1 tablet (8 mg total) by mouth every 12 (twelve) hours as needed for Nausea.    ondansetron (ZOFRAN-ODT) 8 MG TbDL Take 1 tablet (8 mg total) by mouth every 8 (eight) hours as needed (nausea/vomiting).    pantoprazole (PROTONIX) 20 MG tablet Take 1 tablet (20 mg total) by mouth once daily.    promethazine (PHENERGAN) 25 MG tablet Take 1 tablet (25 mg total) by mouth every 6 (six) hours as needed for Nausea.    cetirizine (ZYRTEC) 10 MG tablet Take 10 mg by mouth once daily.    dexAMETHasone (DECADRON) 4 MG Tab Take take 2 tablets (8 mg) by mouth twice daily the day before chemotherapy and the day following chemotherapy, then take 2 tablets (8 mg) by mouth on day 3 of each chemotherapy cycle.    lysine 1,000 mg Tab Take 1 tablet by mouth Daily.    scopolamine (TRANSDERM-SCOP) 1.3-1.5 mg (1 mg over 3 days) Place 1 patch onto the skin every 72 hours.    [DISCONTINUED] albuterol (PROVENTIL/VENTOLIN HFA) 90 mcg/actuation inhaler INHALE 2 PUFFS EVERY 4 HOURS BY INHALATION ROUTE.    [DISCONTINUED] omega 3-dha-epa-fish oil 60- mg CpDR Take 1 capsule by mouth Daily.     Family History       Problem Relation (Age of Onset)    COPD Mother    Coronary artery disease     Diabetes Father, Brother (1),     Heart disease Mother (70), Father (78), Brother    Hypertension Mother, Father, Brother,     Tuberculosis Mother          Tobacco Use    Smoking status: Former     Current packs/day: 0.00     Average packs/day: 0.3 packs/day for 12.0 years (3.0 ttl pk-yrs)     Types: Cigarettes     Start date: 9/9/1971     Quit date: 9/9/1983     Years since quitting:  41.5    Smokeless tobacco: Never   Substance and Sexual Activity    Alcohol use: Yes     Comment: occasional    Drug use: No    Sexual activity: Not on file     Review of Systems   Constitutional:  Positive for fever.   HENT:  Positive for sore throat and trouble swallowing (Pain with swallowing).      Objective:     Vital Signs (Most Recent):  Temp: 98.9 °F (37.2 °C) (03/19/25 1326)  Pulse: 82 (03/19/25 2130)  Resp: 16 (03/19/25 2130)  BP: 103/60 (03/19/25 2130)  SpO2: 96 % (03/19/25 2130) Vital Signs (24h Range):  Temp:  [98.9 °F (37.2 °C)] 98.9 °F (37.2 °C)  Pulse:  [80-96] 82  Resp:  [16-31] 16  SpO2:  [96 %-100 %] 96 %  BP: (101-119)/(60-69) 103/60     Weight: 82.6 kg (182 lb)  Body mass index is 28.51 kg/m².     Physical Exam  Vitals reviewed.   Constitutional:       General: She is not in acute distress.     Appearance: Normal appearance. She is ill-appearing. She is not toxic-appearing.   HENT:      Head: Normocephalic and atraumatic.      Mouth/Throat:      Mouth: Mucous membranes are moist.      Pharynx: Oropharynx is clear.   Eyes:      Extraocular Movements: Extraocular movements intact.      Pupils: Pupils are equal, round, and reactive to light.   Cardiovascular:      Rate and Rhythm: Normal rate and regular rhythm.      Pulses: Normal pulses.      Heart sounds: Normal heart sounds.   Pulmonary:      Effort: Pulmonary effort is normal.      Breath sounds: Normal breath sounds.   Chest:          Comments: Port present  Abdominal:      General: Bowel sounds are normal. There is no distension.      Palpations: Abdomen is soft.      Tenderness: There is abdominal tenderness (around feeding tube).          Comments: feeding tube present   Musculoskeletal:         General: No swelling. Normal range of motion.      Cervical back: Normal range of motion and neck supple.   Skin:     General: Skin is warm and dry.      Capillary Refill: Capillary refill takes less than 2 seconds.   Neurological:      General: No  focal deficit present.      Mental Status: She is alert and oriented to person, place, and time. Mental status is at baseline.   Psychiatric:         Mood and Affect: Mood normal.         Behavior: Behavior normal.         Judgment: Judgment normal.              CRANIAL NERVES     CN III, IV, VI   Pupils are equal, round, and reactive to light.       Significant Labs: All pertinent labs within the past 24 hours have been reviewed.  Recent Lab Results  (Last 5 results in the past 24 hours)        03/19/25  1701   03/19/25  1527   03/19/25  1526   03/19/25  1514   03/19/25  1406        Influenza A, Molecular     Negative           Influenza B, Molecular     Negative           Group A Strep, Molecular   Negative  Comment: Arcanobacterium haemolyticum and Beta Streptococcus group C   and G will not be detected by this test method.  Please order   Throat Culture ([id number]) if suspected.               Albumin       2.8         ALP       128         ALT       39         Anion Gap       8         AST       29         Baso #       0.01         Basophil %       0.4         BILIRUBIN TOTAL       0.7  Comment: For infants and newborns, interpretation of results should be based  on gestational age, weight and in agreement with clinical  observations.    Premature Infant recommended reference ranges:  Up to 24 hours.............<8.0 mg/dL  Up to 48 hours............<12.0 mg/dL  3-5 days..................<15.0 mg/dL  6-29 days.................<15.0 mg/dL           Blood Culture, Routine         No Growth to date  [P]       BUN       12         Calcium       8.2         Chloride       94         CO2       29         Creatinine       0.5         Differential Method       Automated         eGFR       >60.0         Eos #       0.0         Eos %       0.0         Flu A & B Source     Nasal swab           Glucose       144         Gran # (ANC)       1.0         Gran %       42.3         Hematocrit       28.6         Hemoglobin       9.1          Hypo       Occasional         Immature Grans (Abs)       0.02  Comment: Mild elevation in immature granulocytes is non specific and   can be seen in a variety of conditions including stress response,   acute inflammation, trauma and pregnancy. Correlation with other   laboratory and clinical findings is essential.           Immature Granulocytes       0.9         Lactic Acid Level 0.8  Comment: Falsely low lactic acid results can be found in samples   containing >=13.0 mg/dL total bilirubin and/or >=3.5 mg/dL   direct bilirubin.                 Lymph #       0.5         Lymph %       19.8         MCH       26.2         MCHC       31.8         MCV       82         Mono #       0.8         Mono %       36.6         MPV       9.7         nRBC       1         Platelet Estimate       Appears normal         Platelet Count       155         POC Lactate               Potassium       3.2         PROTEIN TOTAL       5.9         RBC       3.47         RDW       16.3         Sample               SARS-CoV-2 RNA, Amplification, Qual   Negative  Comment: This test utilizes isothermal nucleic acid amplification technology   to   detect the SARS-CoV-2 RdRp nucleic acid segment. The analytical   sensitivity   (limit of detection) is 500 copies/swab.     A POSITIVE result is indicative of the presence of SARS-CoV-2 RNA;   clinical   correlation with patient history and other diagnostic information is   necessary to determine patient infection status.    A NEGATIVE result means that SARS-CoV-2 nucleic acids are not present   above   the limit of detection. A NEGATIVE result should be treated as   presumptive.   It does not rule out the possibility of COVID-19 and should not be   the sole   basis for treatment decisions. If COVID-19 is strongly suspected   based on   clinical and exposure history, re-testing using an alternate   molecular assay   should be considered.     This test is FDA approved.Performance characteristics of  this test   has been   independently verified by [facility] in conjunction   with   [facility] Department of Pathology and Laboratory   Medicine.               Sodium       131         WBC       2.27                                 [P] - Preliminary Result               Significant Imaging: I have reviewed all pertinent imaging results/findings within the past 24 hours.    X-Ray Chest PA And Lateral  Narrative: EXAMINATION:  XR CHEST PA AND LATERAL    CLINICAL HISTORY:  Fever, unspecified    FINDINGS:  PA and lateral chest with comparison chest x-ray 01/30/2025.  Left internal jugular Port-A-Cath again noted..  Cardiomediastinal silhouette is within normal limits. Lungs are clear. Pulmonary vasculature is normal. No acute osseous abnormality.  Impression: No acute cardiopulmonary process.    Electronically signed by: Wallace Caballero  Date:    03/19/2025  Time:    14:30      Assessment/Plan:     * Febrile neutropenia  Reverse isolation  Tylenol p.r.n.      Hyponatremia  Fluid restriction    Anemia  Anemia is likely due to chronic disease due to Malignancy. Most recent hemoglobin and hematocrit are listed below.  Recent Labs     03/19/25  1514   HGB 9.1*   HCT 28.6*     Plan  - Monitor serial CBC: Daily  - Transfuse PRBC if patient becomes hemodynamically unstable, symptomatic or H/H drops below 7/21.  - Patient has not received any PRBC transfusions to date  - Patient's anemia is currently worsening. Will monitor    Hypokalemia  Patient's most recent potassium results are listed below.   Recent Labs     03/19/25  1514   K 3.2*     Plan  - Replete potassium per protocol  - Monitor potassium Daily  - Patient's hypokalemia is improving  - please give patient GI cocktail prior to giving potassium supplements due to pain    Malignant neoplasm of abdominal esophagus  Noted  Continue Zofran p.r.n. nausea   Continue Ativan  may continue scopolamine if patient with excessive oral  secretions      Dyslipidemia  Patient is on fish oil at home we will resume on discharge      VTE Risk Mitigation (From admission, onward)           Ordered     enoxaparin injection 40 mg  Daily         03/19/25 2146     IP VTE HIGH RISK PATIENT  Once         03/19/25 2146     Place sequential compression device  Until discontinued         03/19/25 2146                        Sofia Kang NP  Department of Hospital Medicine  [facility] - Emergency Dept

## 2025-03-20 NOTE — PROGRESS NOTES
Pharmacist Renal Dose Adjustment Note    Kwasi Brown is a 59 y.o. female being treated with the medication famotidine     Patient Data:    Vital Signs (Most Recent):  Temp: 98.9 °F (37.2 °C) (03/19/25 1326)  Pulse: 82 (03/19/25 2130)  Resp: 16 (03/19/25 2130)  BP: 103/60 (03/19/25 2130)  SpO2: 96 % (03/19/25 2130) Vital Signs (72h Range):  Temp:  [98.9 °F (37.2 °C)]   Pulse:  [80-96]   Resp:  [16-31]   BP: (101-119)/(60-69)   SpO2:  [96 %-100 %]      Recent Labs   Lab 03/19/25  1514   CREATININE 0.5     Serum creatinine: 0.5 mg/dL 03/19/25 1514  Estimated creatinine clearance: 130.6 mL/min    Medication:famotidine dose: 80 mg  frequency nightly will be changed to medication:famotidine dose:20 mg frequency:q12h  Per Renal Adjustment Policy 135    Pharmacist's Name: Paramjit Wong  Pharmacist's Extension: [phone]

## 2025-03-20 NOTE — PLAN OF CARE
[facility] - Emergency Dept  Initial Discharge Assessment       Primary Care Provider: Sierra, Primary Doctor    Admission Diagnosis: Febrile neutropenia [D70.9, R50.81]    Admission Date: 3/19/2025  Expected Discharge Date:     Patient lives with spouse, independent with ADL's.  No HH/Coumadin/HD.  Spouse will provide transportation home at discharge.           Payor: BLUE CROSS BLUE SHIELD / Plan: BCBS ALL OUT OF STATE / Product Type: PPO /     Extended Emergency Contact Information  Primary Emergency Contact: Tobias Brown  Address: [address] [place] [address]           [place] [address] [facility]  Home Phone: [phone]  Mobile Phone: [phone]  Relation: Spouse  Secondary Emergency Contact: Kira Brown  Mobile Phone: [phone]  Relation: Daughter    Discharge Plan A: Home with family  Discharge Plan B: Home with family      [id number] - [place], [place] - [address] AT [facility]  [address]  [place] [address]  Phone: [phone] Fax: [phone]      Initial Assessment (most recent)       Adult Discharge Assessment - 03/20/25 1541          Discharge Assessment    Assessment Type Discharge Planning Assessment     Confirmed/corrected address, phone number and insurance Yes     Confirmed Demographics Correct on Facesheet     Source of Information patient     Communicated CATHY with patient/caregiver Date not available/Unable to determine     Reason For Admission febrile neutropenia     People in Home spouse     Do you expect to return to your current living situation? Yes     Do you have help at home or someone to help you manage your care at home? Yes     Who are your caregiver(s) and their phone number(s)? Tobias (spouse) [phone]     Prior to hospitilization cognitive status: Alert/Oriented     Current cognitive status: Alert/Oriented     Walking or Climbing Stairs Difficulty no     Dressing/Bathing Difficulty no     Equipment Currently Used at  Home none     Readmission within 30 days? No     Patient currently being followed by outpatient case management? No     Do you currently have service(s) that help you manage your care at home? No     Do you take prescription medications? Yes     Do you have prescription coverage? Yes     Coverage [facility] ALL OUT OF STATE     Do you have any problems affording any of your prescribed medications? No     Is the patient taking medications as prescribed? yes     Who is going to help you get home at discharge? spouse     How do you get to doctors appointments? car, drives self;family or friend will provide     Are you on dialysis? No     Do you take coumadin? No     Discharge Plan A Home with family     Discharge Plan B Home with family     DME Needed Upon Discharge  none

## 2025-03-21 PROBLEM — D69.6 THROMBOCYTOPENIA: Status: ACTIVE | Noted: 2025-03-21

## 2025-03-21 PROBLEM — E43 SEVERE MALNUTRITION: Status: ACTIVE | Noted: 2025-03-21

## 2025-03-21 LAB
ALBUMIN SERPL BCP-MCNC: 2.5 G/DL (ref 3.5–5.2)
ALP SERPL-CCNC: 102 U/L (ref 55–135)
ALT SERPL W/O P-5'-P-CCNC: 26 U/L (ref 10–44)
ANION GAP SERPL CALC-SCNC: 6 MMOL/L (ref 8–16)
AST SERPL-CCNC: 20 U/L (ref 10–40)
BASOPHILS # BLD AUTO: 0.02 K/UL (ref 0–0.2)
BASOPHILS NFR BLD: 0.7 % (ref 0–1.9)
BILIRUB SERPL-MCNC: 0.4 MG/DL (ref 0.1–1)
BUN SERPL-MCNC: 9 MG/DL (ref 8–23)
CALCIUM SERPL-MCNC: 8.2 MG/DL (ref 8.7–10.5)
CHLORIDE SERPL-SCNC: 98 MMOL/L (ref 95–110)
CO2 SERPL-SCNC: 32 MMOL/L (ref 23–29)
CREAT SERPL-MCNC: 0.4 MG/DL (ref 0.5–1.4)
DIFFERENTIAL METHOD BLD: ABNORMAL
EOSINOPHIL # BLD AUTO: 0 K/UL (ref 0–0.5)
EOSINOPHIL NFR BLD: 0 % (ref 0–8)
ERYTHROCYTE [DISTWIDTH] IN BLOOD BY AUTOMATED COUNT: 16.5 % (ref 11.5–14.5)
EST. GFR  (NO RACE VARIABLE): >60 ML/MIN/1.73 M^2
GLUCOSE SERPL-MCNC: 124 MG/DL (ref 70–110)
HCT VFR BLD AUTO: 28.5 % (ref 37–48.5)
HGB BLD-MCNC: 8.8 G/DL (ref 12–16)
IMM GRANULOCYTES # BLD AUTO: 0.13 K/UL (ref 0–0.04)
IMM GRANULOCYTES NFR BLD AUTO: 4.6 % (ref 0–0.5)
LACTATE SERPL-SCNC: 0.9 MMOL/L (ref 0.5–1.9)
LYMPHOCYTES # BLD AUTO: 0.6 K/UL (ref 1–4.8)
LYMPHOCYTES NFR BLD: 19.6 % (ref 18–48)
MAGNESIUM SERPL-MCNC: 1.7 MG/DL (ref 1.6–2.6)
MCH RBC QN AUTO: 25.7 PG (ref 27–31)
MCHC RBC AUTO-ENTMCNC: 30.9 G/DL (ref 32–36)
MCV RBC AUTO: 83 FL (ref 82–98)
MONOCYTES # BLD AUTO: 0.7 K/UL (ref 0.3–1)
MONOCYTES NFR BLD: 25.3 % (ref 4–15)
NEUTROPHILS # BLD AUTO: 1.4 K/UL (ref 1.8–7.7)
NEUTROPHILS NFR BLD: 49.8 % (ref 38–73)
NRBC BLD-RTO: 1 /100 WBC
PLATELET # BLD AUTO: 168 K/UL (ref 150–450)
PMV BLD AUTO: 9.2 FL (ref 9.2–12.9)
POTASSIUM SERPL-SCNC: 3.4 MMOL/L (ref 3.5–5.1)
PROT SERPL-MCNC: 5.2 G/DL (ref 6–8.4)
RBC # BLD AUTO: 3.42 M/UL (ref 4–5.4)
SODIUM SERPL-SCNC: 136 MMOL/L (ref 136–145)
WBC # BLD AUTO: 2.81 K/UL (ref 3.9–12.7)

## 2025-03-21 PROCEDURE — 27000207 HC ISOLATION

## 2025-03-21 PROCEDURE — 25000003 PHARM REV CODE 250: Performed by: NURSE PRACTITIONER

## 2025-03-21 PROCEDURE — 63600175 PHARM REV CODE 636 W HCPCS: Performed by: NURSE PRACTITIONER

## 2025-03-21 PROCEDURE — 94761 N-INVAS EAR/PLS OXIMETRY MLT: CPT

## 2025-03-21 PROCEDURE — 85025 COMPLETE CBC W/AUTO DIFF WBC: CPT | Performed by: NURSE PRACTITIONER

## 2025-03-21 PROCEDURE — 36415 COLL VENOUS BLD VENIPUNCTURE: CPT | Performed by: NURSE PRACTITIONER

## 2025-03-21 PROCEDURE — 12000002 HC ACUTE/MED SURGE SEMI-PRIVATE ROOM

## 2025-03-21 PROCEDURE — 99900035 HC TECH TIME PER 15 MIN (STAT)

## 2025-03-21 PROCEDURE — 99223 1ST HOSP IP/OBS HIGH 75: CPT | Mod: ,,, | Performed by: INTERNAL MEDICINE

## 2025-03-21 PROCEDURE — 25000242 PHARM REV CODE 250 ALT 637 W/ HCPCS: Performed by: NURSE PRACTITIONER

## 2025-03-21 PROCEDURE — 83735 ASSAY OF MAGNESIUM: CPT | Performed by: NURSE PRACTITIONER

## 2025-03-21 PROCEDURE — 63600175 PHARM REV CODE 636 W HCPCS: Performed by: HOSPITALIST

## 2025-03-21 PROCEDURE — 83605 ASSAY OF LACTIC ACID: CPT | Performed by: HOSPITALIST

## 2025-03-21 PROCEDURE — 80053 COMPREHEN METABOLIC PANEL: CPT | Performed by: NURSE PRACTITIONER

## 2025-03-21 PROCEDURE — 25000003 PHARM REV CODE 250: Performed by: HOSPITALIST

## 2025-03-21 RX ORDER — POTASSIUM CHLORIDE 7.45 MG/ML
10 INJECTION INTRAVENOUS
Status: COMPLETED | OUTPATIENT
Start: 2025-03-21 | End: 2025-03-21

## 2025-03-21 RX ORDER — ONDANSETRON HYDROCHLORIDE 2 MG/ML
8 INJECTION, SOLUTION INTRAVENOUS EVERY 6 HOURS PRN
Status: DISCONTINUED | OUTPATIENT
Start: 2025-03-21 | End: 2025-03-24 | Stop reason: HOSPADM

## 2025-03-21 RX ORDER — FAMOTIDINE 20 MG/1
20 TABLET, FILM COATED ORAL NIGHTLY
Status: DISCONTINUED | OUTPATIENT
Start: 2025-03-21 | End: 2025-03-23

## 2025-03-21 RX ORDER — ONDANSETRON HYDROCHLORIDE 4 MG/5ML
8 SOLUTION ORAL EVERY 6 HOURS PRN
Status: DISCONTINUED | OUTPATIENT
Start: 2025-03-21 | End: 2025-03-21

## 2025-03-21 RX ORDER — ACETAMINOPHEN 500 MG
1000 TABLET ORAL EVERY 6 HOURS PRN
Status: DISCONTINUED | OUTPATIENT
Start: 2025-03-21 | End: 2025-03-24 | Stop reason: HOSPADM

## 2025-03-21 RX ORDER — PANTOPRAZOLE SODIUM 20 MG/1
20 TABLET, DELAYED RELEASE ORAL
Status: DISCONTINUED | OUTPATIENT
Start: 2025-03-21 | End: 2025-03-23

## 2025-03-21 RX ADMIN — PIPERACILLIN SODIUM AND TAZOBACTAM SODIUM 4.5 G: 4; .5 INJECTION, POWDER, LYOPHILIZED, FOR SOLUTION INTRAVENOUS at 05:03

## 2025-03-21 RX ADMIN — POTASSIUM BICARBONATE 60 MEQ: 391 TABLET, EFFERVESCENT ORAL at 10:03

## 2025-03-21 RX ADMIN — LORAZEPAM 0.5 MG: 0.5 TABLET ORAL at 09:03

## 2025-03-21 RX ADMIN — HYDROCODONE BITARTRATE AND ACETAMINOPHEN 1 TABLET: 10; 325 TABLET ORAL at 09:03

## 2025-03-21 RX ADMIN — FLUTICASONE PROPIONATE 50 MCG: 50 SPRAY, METERED NASAL at 09:03

## 2025-03-21 RX ADMIN — ONDANSETRON 8 MG: 2 INJECTION INTRAMUSCULAR; INTRAVENOUS at 11:03

## 2025-03-21 RX ADMIN — POTASSIUM CHLORIDE 10 MEQ: 7.46 INJECTION, SOLUTION INTRAVENOUS at 08:03

## 2025-03-21 RX ADMIN — FAMOTIDINE 20 MG: 20 TABLET, FILM COATED ORAL at 09:03

## 2025-03-21 RX ADMIN — PANTOPRAZOLE SODIUM 20 MG: 20 TABLET, DELAYED RELEASE ORAL at 10:03

## 2025-03-21 RX ADMIN — FAMOTIDINE 20 MG: 20 TABLET, FILM COATED ORAL at 08:03

## 2025-03-21 RX ADMIN — THERA TABS 1 TABLET: TAB at 08:03

## 2025-03-21 RX ADMIN — CETIRIZINE HYDROCHLORIDE 10 MG: 10 TABLET, FILM COATED ORAL at 08:03

## 2025-03-21 RX ADMIN — MUPIROCIN 1 G: 20 OINTMENT TOPICAL at 08:03

## 2025-03-21 RX ADMIN — MUPIROCIN 1 G: 20 OINTMENT TOPICAL at 09:03

## 2025-03-21 RX ADMIN — VANCOMYCIN HYDROCHLORIDE 1500 MG: 1.5 INJECTION, POWDER, LYOPHILIZED, FOR SOLUTION INTRAVENOUS at 05:03

## 2025-03-21 RX ADMIN — PIPERACILLIN SODIUM AND TAZOBACTAM SODIUM 4.5 G: 4; .5 INJECTION, POWDER, LYOPHILIZED, FOR SOLUTION INTRAVENOUS at 09:03

## 2025-03-21 RX ADMIN — HYDROCODONE BITARTRATE AND ACETAMINOPHEN 1 TABLET: 10; 325 TABLET ORAL at 04:03

## 2025-03-21 RX ADMIN — ENOXAPARIN SODIUM 40 MG: 40 INJECTION SUBCUTANEOUS at 04:03

## 2025-03-21 RX ADMIN — PIPERACILLIN SODIUM AND TAZOBACTAM SODIUM 4.5 G: 4; .5 INJECTION, POWDER, LYOPHILIZED, FOR SOLUTION INTRAVENOUS at 01:03

## 2025-03-21 RX ADMIN — POTASSIUM BICARBONATE 60 MEQ: 391 TABLET, EFFERVESCENT ORAL at 03:03

## 2025-03-21 RX ADMIN — AZELASTINE HYDROCHLORIDE 274 MCG: 137 SPRAY, METERED NASAL at 08:03

## 2025-03-21 NOTE — ASSESSMENT & PLAN NOTE
Nutrition consulted. Most recent weight and BMI monitored-     Measurements:  Wt Readings from Last 1 Encounters:   03/20/25 86 kg (189 lb 9.5 oz)   Body mass index is 29.69 kg/m².    Patient has been screened and assessed by RD.    Malnutrition Type:  Context:    Level:      Malnutrition Characteristic Summary:       Interventions/Recommendations (treatment strategy):

## 2025-03-21 NOTE — HOSPITAL COURSE
Patient was admitted for febrile neutropenia.  She was started on broad-spectrum IV antibiotic therapy, and improved over the course of her hospitalization.  Patient had some minor electrolyte abnormalities which were assessed.  Oncology was consulted to assist in plan of care. Eventually white count improved and neutropenia resolved.  ESR CRP were elevated and IV antibiotics was continued for 48 hours after neutrophil count improved.  Eventually inflammatory markers downtrended, patient felt much better.  Pancultures resulted negative and white count stable.  She will complete oral antibiotic course for a total of 7 days.  All instructions provided at bedside, she will follow up with discharge Clinic and Oncology as outpatient.  She verbalized understanding.  Case management assisted in appointments. Strict return precautions advised.

## 2025-03-21 NOTE — ASSESSMENT & PLAN NOTE
The likely etiology of thrombocytopenia is bone marrow suppression due to chemotherapy. The patients 3 most recent labs are listed below.  Recent Labs     03/19/25  1514 03/20/25  0753 03/21/25  0512    137* 168     Plan  - Will transfuse if platelet count is <10k.

## 2025-03-21 NOTE — CONSULTS
[facility]  Adult Nutrition   Consult Note (Nutrition Support Management)    SUMMARY     Recommendations  Recommendation/Intervention:   1. Recommend [place] diet. Provided menu and contact information for meal choices.   2. Recommend Boost + Vanilla with meals.   3. Should patient agree: Two [place] HN, 240 mL, 3 times / day with Pro Source TID per PEG, after meals would provide 1605 kcal, 104 gm protein and 1494 free water. Meets 75% EEN and 100% EPN.   4. Suggest 50 mL FWF each Bolus.  Nutrition Goal Status: new  Communication of RD Recs: reviewed with physician    Nutrition Goals: PO intake will meet >50% of estimated needs by RD follow up., Initiate nutrition support as medically feasible by RD follow up., Oral supplement consumption of >50% by RD follow up., and Provide >65% nutritional needs via nutrition support by RD follow up.    Nutrition Interventions: General healthful diet, Enteral Nutrition Management, Medical Food Supplement Therapy, and Other [place]    Nutrition Diagnosis PES Statement:  Malnutrition Type:  Context: chronic illness  Level: severe     Related to (etiology):   Prolonged catabolic illness      Signs and Symptoms (as evidenced by):   Patient with post-radiation esophageal changes having intake < 50% EEN / EPN for >/= 1 month, and significant weight loss > 5% in 1 month meeting AAIM criteria for severe malnutrition.       Malnutrition Characteristic Summary:  Weight Loss (Malnutrition): greater than 5% in 1 month  Energy Intake (Malnutrition): less than or equal to 50% for greater than or equal to 1 month        Interventions/Recommendations (treatment strategy):  1. Recommend [place] diet. Provided menu and contact information for meal choices. 2. Recommend Boost + Vanilla with meals. 3. Should patient agree: Two [place] HN, 240 mL, 3 times / day with Pro Source TID per PEG, after meals would provide 1605 kcal, 104 gm protein and 1494 free water. Meets 75% EEN and 100% EPN. 4. Suggest  50 mL FWF each Bolus.     Nutrition Diagnosis Status:   New    Dietitian Rounds Brief  Patient admitted for febrile neutropenia. Patient with reports of transient pain with meals and declined PEG feedings per MD and RN. Patient agrees to [place] diet with Boost + Vanilla. Will tell RN if she wants it discontinued or changed to Boost Breeze. Pt agreed to consider 3 bolus feedings after meals for additional nutrition. Recommendations discussed with MD and RN. Patient meets criteria for severe malnutrition inadequate (suboptimal) protein-energy intake related to catabolism energy increases as evidenced by patient with < 75 % po intake, decreased tolerance due to swallow pain and altered taste, and not using PEG for nutrition support at this time, and Patient has significant weight loss > 5% in 1 month ( 24 lbs, 12% in 1 month per Epic).  RD to follow for po intake, nutrition support, weight and status change PRN.        Nutrition Related Social Determinants of Health: SDOH: Adequate food in home environment  Food Insecurity: No Food Insecurity (2/19/2025)    Hunger Vital Sign     Worried About Running Out of Food in the Last Year: Never true     Ran Out of Food in the Last Year: Never true       Malnutrition Assessment  Malnutrition Context: chronic illness  Malnutrition Level: severe          Weight Loss (Malnutrition): greater than 5% in 1 month  Energy Intake (Malnutrition): less than or equal to 75% for greater than or equal to 1 month                         Diet order:   Current Diet Order: Regular, [place]   Oral Nutrition Supplement: Order for Boost + Vanilla with meals (360 kcal, 14 gm protein each).             Evaluation of Received Nutrient/Fluid Intake  Energy Calories Required: not meeting needs  Protein Required: not meeting needs  Fluid Required: not meeting needs  Tolerance: other (see comments) (patient reports difficulty swallowing post radiation, now on chemo.)     % Intake of Estimated Energy Needs: 0  "- 25 %  % Meal Intake: 0 - 25 %    No intake or output data in the 24 hours ending 03/21/25 1536     Anthropometrics  Height: 5' 7" (170.2 cm)  Height (inches): 67 in  Height Method: Stated  Weight: 86 kg (189 lb 9.5 oz)  Weight (lb): 189.6 lb  Weight Method: Bed Scale  Ideal Body Weight (IBW), Female: 135 lb  % Ideal Body Weight, Female (lb): 140.44 %  BMI (Calculated): 29.7  BMI Grade: 25 - 29.9 - overweight       Estimated/Assessed Needs  Weight Used For Calorie Calculations: 86 kg (189 lb 9.5 oz)  Energy Calorie Requirements (kcal): 0360-7548 kcal / day (25-30 kcal/kg).  Energy Need Method: Kcal/kg  Protein Requirements: 103-129 gm/day (1.2-1.5 gm/kg)  Weight Used For Protein Calculations: 86 kg (189 lb 9.5 oz)  Fluid Requirements (mL): 1 mL/ kcal  Estimated Fluid Requirement Method: other (see comments)  RDA Method (mL): 2150       Reason for Assessment  Reason For Assessment: consult (tube feeds/ supplement)  Diagnosis: cancer diagnosis/related complications  General Information Comments: Patient with history of esophageal cancer who presented with elevated temperature of 101.7°.  Patient's spoke with her oncologists and primary who had recommended that she come to the ED for evaluation.  Patient reports her last chemotherapy was 9 days prior.  Patient reports that she has severe pain with swallowing she finds that sometimes Maalox helps with the pain in her esophagus after eating. Patient declining PEG feedings prior to RD visit per MD and RN.    Final diagnoses:  [Z13.6] Screening for cardiovascular condition  [R50.9] Fever  [D70.9, R50.81] Febrile neutropenia (Primary)     Past Medical History:   Diagnosis Date    Esophageal cancer 01/04/2025    Hypertension 2009    Obesity         Nutrition/Diet History  Nutrition Intake History: Patient reports some foods are better tolerated than others. Pt eating < EEN /EPN per oncology RD assessment 2/24/25.  Food Preferences: Patient dislikes strawberries. Agreed to " try Boost + (told RN Boost Seb), No spicy foods.  Spiritual, Cultural Beliefs, Zoroastrian Practices, Values that Affect Care: no  Food Allergies: NKFA  Factors Affecting Nutritional Intake: difficulty/impaired swallowing, altered taste    Nutrition Risk Screen        MST Score: 0  Have you recently lost weight without trying?: No  Weight loss score: 0  Have you been eating poorly because of a decreased appetite?: No  Appetite score: 0       Weight History:  Wt Readings from Last 10 Encounters:   03/20/25 86 kg (189 lb 9.5 oz)   03/11/25 87 kg (191 lb 12.8 oz)   03/10/25 87.4 kg (192 lb 9.6 oz)   03/07/25 88.5 kg (195 lb 1.6 oz)   03/07/25 87.6 kg (193 lb 2 oz)   02/25/25 88.6 kg (195 lb 3.5 oz)   02/24/25 87.5 kg (192 lb 12.8 oz)   02/21/25 88.9 kg (195 lb 15.8 oz)   01/31/25 96.7 kg (213 lb 3 oz)   01/30/25 95 kg (209 lb 7 oz)        Lab/Procedures/Meds: Pertinent Labs/Meds Reviewed    Medications:Pertinent Medications Reviewed  Scheduled Meds:   aluminum-magnesium hydroxide-simethicone  30 mL Oral Once    azelastine  2 spray Nasal BID    cetirizine  10 mg Oral Daily    enoxparin  40 mg Subcutaneous Daily    famotidine  20 mg Oral QHS    fluticasone propionate  1 spray Each Nostril Daily    LIDOcaine viscous HCl 2%  15 mL Oral Once    multivitamin  1 tablet Oral Daily    mupirocin   Nasal BID    pantoprazole  20 mg Oral Before breakfast    piperacillin-tazobactam (Zosyn) IV (PEDS and ADULTS) (extended infusion is not appropriate)  4.5 g Intravenous Q8H    scopolamine  1 patch Transdermal Q72H    vancomycin (VANCOCIN) IV (PEDS and ADULTS)  1,500 mg Intravenous Q12H     Continuous Infusions:  PRN Meds:.  Current Facility-Administered Medications:     acetaminophen, 1,000 mg, Oral, Q6H PRN    albuterol sulfate, 2.5 mg, Nebulization, Q4H PRN    aluminum-magnesium hydroxide-simethicone, 30 mL, Oral, Q6H PRN **AND** LIDOcaine viscous HCl 2%, 15 mL, Oral, Once    dextrose 50%, 12.5 g, Intravenous, PRN    dextrose 50%,  "25 g, Intravenous, PRN    glucagon (human recombinant), 1 mg, Intramuscular, PRN    glucose, 16 g, Oral, PRN    glucose, 24 g, Oral, PRN    HYDROcodone-acetaminophen, 1 tablet, Oral, Q6H PRN    HYDROcodone-acetaminophen, 1 tablet, Oral, Q6H PRN    LORazepam, 0.5 mg, Oral, Q6H PRN    magnesium oxide, 800 mg, Oral, PRN    magnesium oxide, 800 mg, Oral, PRN    melatonin, 6 mg, Oral, Nightly PRN    naloxone, 0.02 mg, Intravenous, PRN    ondansetron, 8 mg, Intravenous, Q6H PRN    potassium bicarbonate, 35 mEq, Oral, PRN    potassium bicarbonate, 50 mEq, Oral, PRN    potassium bicarbonate, 60 mEq, Oral, PRN    potassium, sodium phosphates, 2 packet, Oral, PRN    potassium, sodium phosphates, 2 packet, Oral, PRN    potassium, sodium phosphates, 2 packet, Oral, PRN    senna-docusate 8.6-50 mg, 1 tablet, Oral, BID PRN    sodium chloride 0.9%, 2 mL, Intravenous, PRN    Pharmacy to dose Vancomycin consult, , , Once **AND** vancomycin - pharmacy to dose, , Intravenous, pharmacy to manage frequency    Labs: Pertinent Labs Reviewed  Clinical Chemistry:  Recent Labs   Lab 03/19/25  1514 03/19/25  1514 03/20/25  0753 03/21/25  0512   *  --  134* 136   K 3.2*  --  3.1* 3.4*   CL 94*  --  95 98   CO2 29  --  31* 32*   *  --  115* 124*   BUN 12  --  11 9   CREATININE 0.5  --  0.5 0.4*   CALCIUM 8.2*  --  8.3* 8.2*   PROT 5.9*  --  5.5* 5.2*   ALBUMIN 2.8*  --  2.7* 2.5*   BILITOT 0.7  --  0.5 0.4   ALKPHOS 128  --  112 102   AST 29  --  22 20   ALT 39  --  32 26   ANIONGAP 8  --  8 6*   MG  --    < > 1.9 1.7    < > = values in this interval not displayed.     CBC:   Recent Labs   Lab 03/21/25  0512   WBC 2.81*   RBC 3.42*   HGB 8.8*   HCT 28.5*      MCV 83   MCH 25.7*   MCHC 30.9*     Lipid Panel:  No results for input(s): "CHOL", "HDL", "LDLCALC", "TRIG", "CHOLHDL" in the last 168 hours.  Cardiac Profile:  No results for input(s): "BNP", "CPK", "CPKMB", "TROPONINI", "CKTOTAL" in the last 168 hours.  Inflammatory " "Labs:  No results for input(s): "CRP" in the last 168 hours.  Diabetes:  Recent Labs   Lab 03/20/25  1928   POCTGLUCOSE 124*     Thyroid & Parathyroid:  No results for input(s): "TSH", "FREET4", "E0QSMUR", "X0TKNYC", "THYROIDAB" in the last 168 hours.    Monitor and Evaluation  Monitor and Evaluation: Diet order, Food and beverage intake, Enteral and parenteral nutrition administration     Discharge Planning  Nutrition Discharge Planning: Therapeutic diet (comments), Oral diet with texture modifications per SLP (comments), Enteral nutrition (comments)  Therapeutic diet (comments): [place] diet as tolerated.    Nutrition Risk  Level of Risk/Frequency of Follow-up:  (2 x / week)     Nutrition Follow-Up  RD Follow-up?: Yes            "

## 2025-03-21 NOTE — CARE UPDATE
03/20/25 2009   Patient Assessment/Suction   Level of Consciousness (AVPU) alert   Respiratory Effort Normal;Unlabored   Expansion/Accessory Muscles/Retractions expansion symmetric;no retractions;no use of accessory muscles   Rhythm/Pattern, Respiratory depth regular;pattern regular;unlabored   PRE-TX-O2   Device (Oxygen Therapy) room air   SpO2 96 %   Pulse Oximetry Type Intermittent   $ Pulse Oximetry - Multiple Charge Pulse Oximetry - Multiple   Pulse 73   Resp 18   Aerosol Therapy   $ Aerosol Therapy Charges PRN treatment not required   Respiratory Evaluation   $ Care Plan Tech Time 15 min

## 2025-03-21 NOTE — SUBJECTIVE & OBJECTIVE
Interval History:  Patient seen and examined.  Overnight, blood pressure remained stable.  Patient complains of pain at the PEG tube site.  Plan of care discussed with the patient at the bedside in detail.    Review of Systems  Objective:     Vital Signs (Most Recent):  Temp: 98.9 °F (37.2 °C) (03/21/25 1051)  Pulse: 77 (03/21/25 1051)  Resp: 19 (03/21/25 1051)  BP: 96/67 (03/21/25 1051)  SpO2: 95 % (03/21/25 1051) Vital Signs (24h Range):  Temp:  [98 °F (36.7 °C)-99.1 °F (37.3 °C)] 98.9 °F (37.2 °C)  Pulse:  [73-85] 77  Resp:  [8-19] 19  SpO2:  [95 %-100 %] 95 %  BP: ()/(63-80) 96/67     Weight: 86 kg (189 lb 9.5 oz)  Body mass index is 29.69 kg/m².  No intake or output data in the 24 hours ending 03/21/25 1300      Physical Exam  Vitals and nursing note reviewed.   Constitutional:       General: She is not in acute distress.     Appearance: She is ill-appearing.   HENT:      Head:      Comments: Alopecia noted  Eyes:      Pupils: Pupils are equal, round, and reactive to light.   Cardiovascular:      Rate and Rhythm: Normal rate and regular rhythm.      Heart sounds: No murmur heard.  Pulmonary:      Effort: Pulmonary effort is normal.      Breath sounds: Normal breath sounds.   Abdominal:      General: There is no distension.      Palpations: Abdomen is soft.      Tenderness: There is no abdominal tenderness.      Comments: Percutaneous gastrostomy in place, no erythema or tenderness noted.   Skin:     Coloration: Skin is pale.      Findings: No rash.   Neurological:      Mental Status: She is alert and oriented to person, place, and time.               Significant Labs: All pertinent labs within the past 24 hours have been reviewed.    Significant Imaging: I have reviewed all pertinent imaging results/findings within the past 24 hours.

## 2025-03-21 NOTE — ASSESSMENT & PLAN NOTE
Patient's most recent potassium results are listed below.   Recent Labs     03/19/25  1514 03/20/25  0753 03/21/25  0512   K 3.2* 3.1* 3.4*     Plan  - Replete potassium per protocol  - Monitor potassium Daily  - Patient's hypokalemia is improving  - please give patient GI cocktail prior to giving potassium supplements due to pain

## 2025-03-21 NOTE — ASSESSMENT & PLAN NOTE
Anemia is likely due to chronic disease due to Malignancy. Most recent hemoglobin and hematocrit are listed below.  Recent Labs     03/19/25  1514 03/20/25  0753 03/21/25  0512   HGB 9.1* 8.6* 8.8*   HCT 28.6* 27.9* 28.5*     Plan  - Monitor serial CBC: Daily  - Transfuse PRBC if patient becomes hemodynamically unstable, symptomatic or H/H drops below 7/21.  - Patient has not received any PRBC transfusions to date  - Patient's anemia is currently worsening. Will monitor

## 2025-03-21 NOTE — PLAN OF CARE
Problem: Malnutrition  Goal: Improved Nutritional Intake  Intervention: Promote and Optimize Oral Intake  3/21/2025 1529 by Ekaterina Vera RD  Flowsheets (Taken 3/21/2025 1529)  Oral Nutrition Promotion:   nutrition counseling provided   calorie-dense liquids provided  Nutrition Interventions:   diet advanced   diet liberalized   meals from home/family encouraged   food preferences provided   supplemental drinks provided  3/21/2025 1528 by Ekaterina Vera RD  Flowsheets (Taken 3/21/2025 1528)  Nutrition Interventions:   diet advanced   diet liberalized   food preferences provided  Intervention: Optimize Nutrition Delivery  Flowsheets (Taken 3/21/2025 1529)  Nutrition Support Management: other (see comments) Order for bolus feeding placed and held for patient to agree.

## 2025-03-21 NOTE — ASSESSMENT & PLAN NOTE
Hyponatremia is likely due to Dehydration/hypovolemia. The patient's most recent sodium results are listed below.  Recent Labs     03/19/25  1514 03/20/25  0753 03/21/25  0512   * 134* 136     Plan  - Correct the sodium by 4-6mEq in 24 hours.   - Monitor sodium Daily.   - Patient hyponatremia is stable  - continue to monitor.     no known allergies You can access the FollowMyHealth Patient Portal offered by [facility] by registering at the following website: [email or web address]. By joining [facility]’s FollowMyHealth portal, you will also be able to view your health information using other applications (apps) compatible with our system.

## 2025-03-21 NOTE — ASSESSMENT & PLAN NOTE
This patient is found to have pancytopenia, the likely etiology is Drug induced (including chemotherapy) related to esophageal carcinoma, will monitor CBC Daily. Will transfuse red blood cells if the hemoglobin is <7g/dL (or <8 in the setting of ACS). Will transfuse platelets if platelet count is less than 10,000. Hold DVT prophylaxis if platelets are <50k. The patient's hemoglobin, white blood cell count, and platelet count results have been reviewed and are listed below.  Recent Labs   Lab 03/21/25  0512   HGB 8.8*   WBC 2.81*

## 2025-03-21 NOTE — ASSESSMENT & PLAN NOTE
Malnutrition Type:  Context: chronic illness  Level: severe    Related to (etiology):   Prolonged catabolic illness     Signs and Symptoms (as evidenced by):   Patient with post-radiation esophageal changes having intake < 50% EEN / EPN for >/= 1 month, and significant weight loss > 5% in 1 month meeting AAIM criteria for severe malnutrition.      Malnutrition Characteristic Summary:  Weight Loss (Malnutrition): greater than 5% in 1 month  Energy Intake (Malnutrition): less than or equal to 50% for greater than or equal to 1 month      Interventions/Recommendations (treatment strategy):  1. Recommend [place] diet. Provided menu and contact information for meal choices. 2. Recommend Boost + Vanilla with meals. 3. Should patient agree: Two [place] HN, 240 mL, 3 times / day with Pro Source TID per PEG, after meals would provide 1605 kcal, 104 gm protein and 1494 free water. Meets 75% EEN and 100% EPN. 4. Suggest 50 mL FWF each Bolus.    Nutrition Diagnosis Status:   New

## 2025-03-21 NOTE — PROGRESS NOTES
[facility]  Progress Note    Patient Name: Kwasi Brown  MRN: [id number]  Patient Class: IP- Inpatient   Admission Date: 3/19/2025  Length of Stay: 2 days  Attending Physician: Real Leon MD  Primary Care Provider: Sierra, Primary Doctor        Subjective     Principal Problem:Febrile neutropenia        HPI:  59-year-old female with pMHx of esophageal cancer who presented to the ED with complaints elevated temperature of 101.7°.  Patient's spoke with her oncologists and P who had recommended that she come to the ED for evaluation.  Patient reports her last chemotherapy was 9 days prior.  Patient reports that she has severe pain with swallowing she finds that sometimes Maalox helps with the pain in her esophagus after eating.  Discussed starting patient on GI cocktail to help with pain of her oral potassium supplements.  Patient will be placed on airborne and contact an enhanced respiratory precautions due to her neutropenia.  Patient is borderline with an ANC of 1000.    Triage vitals with pulse 96, /69, temp 98.9°, SpO2 96% on room air.  Blood work with sodium 131, potassium 3.2, chloride 94, glucose 144, calcium 8.2, total protein 5.9, albumin 2.8, WBC 2.2, RBC 3.4, hemoglobin 9.1, hematocrit 28.6, urine negative for infection.  Blood cultures drawn, group a strep negative, chest x-ray with no acute cardiopulmonary process.    Overview/Hospital Course:  Patient was admitted for febrile neutropenia.  She was started on broad-spectrum IV antibiotic therapy, and improved over the course of her hospitalization.  Patient had some minor electrolyte abnormalities which were assessed.  Oncology was consulted to assist in plan of care.    Interval History:  Patient seen and examined.  Overnight, blood pressure remained stable.  Patient complains of pain at the PEG tube site.  Plan of care discussed with the patient at the bedside in detail.    Review of Systems  Objective:     Vital  Signs (Most Recent):  Temp: 98.9 °F (37.2 °C) (03/21/25 1051)  Pulse: 77 (03/21/25 1051)  Resp: 19 (03/21/25 1051)  BP: 96/67 (03/21/25 1051)  SpO2: 95 % (03/21/25 1051) Vital Signs (24h Range):  Temp:  [98 °F (36.7 °C)-99.1 °F (37.3 °C)] 98.9 °F (37.2 °C)  Pulse:  [73-85] 77  Resp:  [8-19] 19  SpO2:  [95 %-100 %] 95 %  BP: ()/(63-80) 96/67     Weight: 86 kg (189 lb 9.5 oz)  Body mass index is 29.69 kg/m².  No intake or output data in the 24 hours ending 03/21/25 1300      Physical Exam  Vitals and nursing note reviewed.   Constitutional:       General: She is not in acute distress.     Appearance: She is ill-appearing.   HENT:      Head:      Comments: Alopecia noted  Eyes:      Pupils: Pupils are equal, round, and reactive to light.   Cardiovascular:      Rate and Rhythm: Normal rate and regular rhythm.      Heart sounds: No murmur heard.  Pulmonary:      Effort: Pulmonary effort is normal.      Breath sounds: Normal breath sounds.   Abdominal:      General: There is no distension.      Palpations: Abdomen is soft.      Tenderness: There is no abdominal tenderness.      Comments: Percutaneous gastrostomy in place, no erythema or tenderness noted.   Skin:     Coloration: Skin is pale.      Findings: No rash.   Neurological:      Mental Status: She is alert and oriented to person, place, and time.               Significant Labs: All pertinent labs within the past 24 hours have been reviewed.    Significant Imaging: I have reviewed all pertinent imaging results/findings within the past 24 hours.      Assessment & Plan  Febrile neutropenia  This patient does have evidence of infective focus  My overall impression is sepsis.  Source: Unknown  Antibiotics given-   Antibiotics (72h ago, onward)      Start     Stop Route Frequency Ordered    03/21/25 0600  vancomycin 1,500 mg in 0.9% NaCl 250 mL IVPB (admixture device)         -- IV Every 12 hours (non-standard times) 03/20/25 1629    03/20/25 7340   "piperacillin-tazobactam (ZOSYN) 4.5 g in D5W 100 mL IVPB (MB+)  (Sepsis Treatment Panel)         03/26/25 2059 IV Every 8 hours (non-standard times) 03/20/25 1620 03/20/25 1720  vancomycin - pharmacy to dose  (Sepsis Treatment Panel)        Placed in "And" Linked Group    -- IV pharmacy to manage frequency 03/20/25 1620    03/19/25 2300  mupirocin 2 % ointment         03/24/25 2059 Nasl 2 times daily 03/19/25 2148          Latest lactate reviewed-  Recent Labs   Lab 03/19/25  1405 03/19/25  1701 03/21/25  0512   LACTATE  --    < > 0.9   POCLAC 1.44  --   --     < > = values in this interval not displayed.   Fluid challenge Fluid Not Needed - Patient is not hypotensive and/or lactate is less than 4.0.           Pancytopenia  This patient is found to have pancytopenia, the likely etiology is Drug induced (including chemotherapy) related to esophageal carcinoma , will monitor CBC Daily. Will transfuse red blood cells if the hemoglobin is <7g/dL (or <8 in the setting of ACS). Will transfuse platelets if platelet count is less than 10,000. Hold DVT prophylaxis if platelets are <50k. The patient's hemoglobin, white blood cell count, and platelet count results have been reviewed and are listed below.  Recent Labs   Lab 03/21/25  0512   HGB 8.8*   WBC 2.81*              Dyslipidemia  Patient is on fish oil at home we will resume on discharge  Malignant neoplasm of abdominal esophagus  Noted  Continue Zofran p.r.n. nausea   Continue Ativan  may continue scopolamine if patient with excessive oral secretions    Hypokalemia  Patient's most recent potassium results are listed below.   Recent Labs     03/19/25  1514 03/20/25  0753 03/21/25  0512   K 3.2* 3.1* 3.4*     Plan  - Replete potassium per protocol  - Monitor potassium Daily  - Patient's hypokalemia is improving  - please give patient GI cocktail prior to giving potassium supplements due to pain    Anemia  Anemia is likely due to chronic disease due to " Malignancy. Most recent hemoglobin and hematocrit are listed below.  Recent Labs     03/19/25  1514 03/20/25  0753 03/21/25  0512   HGB 9.1* 8.6* 8.8*   HCT 28.6* 27.9* 28.5*     Plan  - Monitor serial CBC: Daily  - Transfuse PRBC if patient becomes hemodynamically unstable, symptomatic or H/H drops below 7/21.  - Patient has not received any PRBC transfusions to date  - Patient's anemia is currently worsening. Will monitor    Hyponatremia  Hyponatremia is likely due to Dehydration/hypovolemia. The patient's most recent sodium results are listed below.  Recent Labs     03/19/25  1514 03/20/25  0753 03/21/25  0512   * 134* 136     Plan  - Correct the sodium by 4-6mEq in 24 hours.   - Monitor sodium Daily.   - Patient hyponatremia is stable  - continue to monitor.    Malnutrition of mild degree  Nutrition consulted. Most recent weight and BMI monitored-     Measurements:  Wt Readings from Last 1 Encounters:   03/20/25 86 kg (189 lb 9.5 oz)   Body mass index is 29.69 kg/m².    Patient has been screened and assessed by RD.    Malnutrition Type:  Context:    Level:      Malnutrition Characteristic Summary:       Interventions/Recommendations (treatment strategy):         Thrombocytopenia  The likely etiology of thrombocytopenia is bone marrow suppression due to chemotherapy . The patients 3 most recent labs are listed below.  Recent Labs     03/19/25  1514 03/20/25  0753 03/21/25  0512    137* 168     Plan  - Will transfuse if platelet count is <10k.      VTE Risk Mitigation (From admission, onward)           Ordered     enoxaparin injection 40 mg  Daily         03/19/25 2146     IP VTE HIGH RISK PATIENT  Once         03/19/25 2146     Place sequential compression device  Until discontinued         03/19/25 2146                    Discharge Planning   CATHY: 3/22/2025     Code Status: Full Code   Medical Readiness for Discharge Date:   Discharge Plan A: Home with family                        Real BARBA Edward  MD  Department of Hospital Medicine   [facility]

## 2025-03-21 NOTE — CONSULTS
[facility]  Hematology/Oncology  Consult Note    Patient Name: Kwasi Brown  MRN: [id number]  Admission Date: 3/19/2025  Hospital Length of Stay: 2 days  Code Status: Full Code   Attending Provider: Real Leon MD  Consulting Provider: Luisa Rea MD  Primary Care Physician: Sierra, Primary Doctor  Principal Problem:Febrile neutropenia    Inpatient consult to Hematology/Oncology  Consult performed by: Luisa Rea MD  Consult ordered by: Real Leon MD        Subjective:     HPI:  59-year-old  woman known to my partner Dr Khoobehi patient currently on FLOT chemotherapy  Admitted with febrile neutropenia  Last dose of chemotherapy day 1 cycle 2, March 10, 2025  Patient admitted to severe difficulty and pain on swallowing.  In ED found to have temperature of 98° ANC was a 1000 O2 sats of 96% patient reported fever at home of 101.7 patient has been pancultured and admitted with IV antibiotics  Oncology Treatment Plan:   OP FLOT - FLUOROURACIL LEUCOVORIN OXALIPLATIN DOCETAXEL Q2W    Medications:  Continuous Infusions:  Scheduled Meds:   aluminum-magnesium hydroxide-simethicone  30 mL Oral Once    azelastine  2 spray Nasal BID    cetirizine  10 mg Oral Daily    enoxparin  40 mg Subcutaneous Daily    famotidine  20 mg Oral QHS    fluticasone propionate  1 spray Each Nostril Daily    LIDOcaine viscous HCl 2%  15 mL Oral Once    multivitamin  1 tablet Oral Daily    mupirocin   Nasal BID    pantoprazole  20 mg Oral Before breakfast    piperacillin-tazobactam (Zosyn) IV (PEDS and ADULTS) (extended infusion is not appropriate)  4.5 g Intravenous Q8H    scopolamine  1 patch Transdermal Q72H    vancomycin (VANCOCIN) IV (PEDS and ADULTS)  1,500 mg Intravenous Q12H     PRN Meds:  Current Facility-Administered Medications:     acetaminophen, 1,000 mg, Oral, Q6H PRN    albuterol sulfate, 2.5 mg, Nebulization, Q4H PRN    aluminum-magnesium hydroxide-simethicone, 30 mL, Oral, Q6H PRN **AND**  LIDOcaine viscous HCl 2%, 15 mL, Oral, Once    dextrose 50%, 12.5 g, Intravenous, PRN    dextrose 50%, 25 g, Intravenous, PRN    glucagon (human recombinant), 1 mg, Intramuscular, PRN    glucose, 16 g, Oral, PRN    glucose, 24 g, Oral, PRN    HYDROcodone-acetaminophen, 1 tablet, Oral, Q6H PRN    HYDROcodone-acetaminophen, 1 tablet, Oral, Q6H PRN    LORazepam, 0.5 mg, Oral, Q6H PRN    magnesium oxide, 800 mg, Oral, PRN    magnesium oxide, 800 mg, Oral, PRN    melatonin, 6 mg, Oral, Nightly PRN    naloxone, 0.02 mg, Intravenous, PRN    ondansetron, 8 mg, Intravenous, Q6H PRN    potassium bicarbonate, 35 mEq, Oral, PRN    potassium bicarbonate, 50 mEq, Oral, PRN    potassium bicarbonate, 60 mEq, Oral, PRN    potassium, sodium phosphates, 2 packet, Oral, PRN    potassium, sodium phosphates, 2 packet, Oral, PRN    potassium, sodium phosphates, 2 packet, Oral, PRN    senna-docusate 8.6-50 mg, 1 tablet, Oral, BID PRN    sodium chloride 0.9%, 2 mL, Intravenous, PRN    Pharmacy to dose Vancomycin consult, , , Once **AND** vancomycin - pharmacy to dose, , Intravenous, pharmacy to manage frequency     Review of patient's allergies indicates:  No Known Allergies     Past Medical History:   Diagnosis Date    Esophageal cancer 01/04/2025    Hypertension 2009    Obesity      Past Surgical History:   Procedure Laterality Date    ADENOIDECTOMY      COLONOSCOPY W/ POLYPECTOMY  1989    Age 24 - polyps negative    HYSTERECTOMY  2009    Partial Hysterectomy    INSERTION OF TUNNELED CENTRAL VENOUS CATHETER (CVC) WITH SUBCUTANEOUS PORT Left 1/30/2025    Procedure: [id number];  Surgeon: Patrick Whelan Jr., MD;  Location: [facility] OR;  Service: General;  Laterality: Left;    INSERTION, GASTROSTOMY TUBE, LAPAROSCOPIC N/A 1/30/2025    Procedure: INSERTION, GASTROSTOMY TUBE, LAPAROSCOPIC;  Surgeon: Patrick Whelan Jr., MD;  Location: [facility] OR;  Service: General;  Laterality: N/A;    KNEE RECONSTRUCTION, MEDIAL PATELLAR  FEMORAL LIGAMENT Bilateral     left ALSO RIGHT KNEE SCOPE    TONSILLECTOMY       Family History       Problem Relation (Age of Onset)    COPD Mother    Coronary artery disease     Diabetes Father, Brother (1),     Heart disease Mother (70), Father (78), Brother    Hypertension Mother, Father, Brother,     Tuberculosis Mother          Tobacco Use    Smoking status: Former     Current packs/day: 0.00     Average packs/day: 0.3 packs/day for 12.0 years (3.0 ttl pk-yrs)     Types: Cigarettes     Start date: 1971     Quit date: 1983     Years since quittin.5    Smokeless tobacco: Never   Substance and Sexual Activity    Alcohol use: Yes     Comment: occasional    Drug use: No    Sexual activity: Not on file       Review of Systems  Patient reports significant burning and discomfort on swallowing pill  Has PEG tube which is used  Patient has sore throat and fever  Denies urinary symptoms  Denies cough sputum production patient's dizziness chest pains  No headaches reported  No neurological difficulties reported no blurred vision or seizure activities  Objective:     Vital Signs (Most Recent):  Temp: 97.9 °F (36.6 °C) (25 1537)  Pulse: 83 (25 1537)  Resp: 19 (25 1537)  BP: 93/60 (25 1537)  SpO2: 99 % (25 1537) Vital Signs (24h Range):  Temp:  [97.9 °F (36.6 °C)-99.1 °F (37.3 °C)] 97.9 °F (36.6 °C)  Pulse:  [73-85] 83  Resp:  [8-19] 19  SpO2:  [95 %-99 %] 99 %  BP: ()/(60-80) 93/60     Weight: 86 kg (189 lb 9.5 oz)  Body mass index is 29.69 kg/m².  Body surface area is 2.02 meters squared.    No intake or output data in the 24 hours ending 25 1551    Physical Exam  VITAL SIGNS:  as above   GENERAL: appears well-built, well-nourished.  Alopecia. No anxiety, no agitation, and in no distress.  Patient is awake, alert, oriented and cooperative.  HEENT:  Showed no congestion. Trachea is central no obvious icterus or pallor noted no hoarseness. no obvious JVD   NECK:  Supple.   No JVD. No obvious cervical submental or supraclavicular adenopathy.  RS:the visualized portion of  Chest expands well. chest appears symmetric, no audible wheezes.  No dyspnea recognized  ABDOMEN:  abdomen appears undistended.  EXTREMITIES:  Without edema.  NEUROLOGICAL:  The patient is appropriate, higher functions are normal.  No  obvious neurological deficits.  normal judgement normal thought content  No confusion, no speech impediment. Cranial nerves are intact and show no deficit. No gross motor deficits noted   SKIN MUSCULOSKELETAL: no joint or skeletal deformity, no clubbing of nails.  No visible rash ecchymosis or petechiae   Significant Labs:   Lab Results   Component Value Date    WBC 2.81 (L) 03/21/2025    HGB 8.8 (L) 03/21/2025    HCT 28.5 (L) 03/21/2025    MCV 83 03/21/2025     03/21/2025      CMP  Sodium   Date Value Ref Range Status   03/21/2025 136 136 - 145 mmol/L Final     Potassium   Date Value Ref Range Status   03/21/2025 3.4 (L) 3.5 - 5.1 mmol/L Final     Chloride   Date Value Ref Range Status   03/21/2025 98 95 - 110 mmol/L Final     CO2   Date Value Ref Range Status   03/21/2025 32 (H) 23 - 29 mmol/L Final     Glucose   Date Value Ref Range Status   03/21/2025 124 (H) 70 - 110 mg/dL Final     BUN   Date Value Ref Range Status   03/21/2025 9 8 - 23 mg/dL Final     Creatinine   Date Value Ref Range Status   03/21/2025 0.4 (L) 0.5 - 1.4 mg/dL Final     Calcium   Date Value Ref Range Status   03/21/2025 8.2 (L) 8.7 - 10.5 mg/dL Final     Total Protein   Date Value Ref Range Status   03/21/2025 5.2 (L) 6.0 - 8.4 g/dL Final     Albumin   Date Value Ref Range Status   03/21/2025 2.5 (L) 3.5 - 5.2 g/dL Final     Total Bilirubin   Date Value Ref Range Status   03/21/2025 0.4 0.1 - 1.0 mg/dL Final     Comment:     For infants and newborns, interpretation of results should be based  on gestational age, weight and in agreement with clinical  observations.    Premature Infant recommended  reference ranges:  Up to 24 hours.............<8.0 mg/dL  Up to 48 hours............<12.0 mg/dL  3-5 days..................<15.0 mg/dL  6-29 days.................<15.0 mg/dL       Alkaline Phosphatase   Date Value Ref Range Status   03/21/2025 102 55 - 135 U/L Final     AST   Date Value Ref Range Status   03/21/2025 20 10 - 40 U/L Final     ALT   Date Value Ref Range Status   03/21/2025 26 10 - 44 U/L Final     Anion Gap   Date Value Ref Range Status   03/21/2025 6 (L) 8 - 16 mmol/L Final     eGFR   Date Value Ref Range Status   03/21/2025 >60.0 >60 mL/min/1.73 m^2 Final          Assessment/Plan:     Active Diagnoses:    Diagnosis Date Noted POA    PRINCIPAL PROBLEM:  Febrile neutropenia [D70.9, R50.81] 03/19/2025 Yes    Thrombocytopenia [D69.6] 03/21/2025 Yes    Severe malnutrition [E43] 03/21/2025 Yes    Pancytopenia [D61.818] 03/20/2025 Yes    Malnutrition of mild degree [E44.1] 03/20/2025 Yes    Anemia [D64.9] 03/19/2025 Yes    Hyponatremia [E87.1] 03/19/2025 Yes    Hypokalemia [E87.6] 03/07/2025 Yes    Malignant neoplasm of abdominal esophagus [C15.5] 01/15/2025 Yes    Dyslipidemia [E78.5] 05/05/2021 Yes      Problems Resolved During this Admission:   Metastatic esophageal cancer with supraclavicular need blood involvement.  Patient completed palliative XRT currently on FLOT chemotherapy  Febrile neutropenia; patient has been pancultured currently on Zosyn and vancomycin neutropenia already improving.  Therefore if cultures are negative patient should be able to be discharged soon  Anemia; Supportive transfusion as needed ordered if hemoglobin below 7 g  Probably related to chemotherapy  Neutropenia; patient will need to start growth factor with ongoing treated  Hypokalemia; patient is having significant difficulty swallowing these pills may need to be replaced IV awhile PEG tube .  May use port if necessary for infusion  Luisa Rea MD  Hematology/Oncology  [facility]

## 2025-03-21 NOTE — NURSING
We have been unable to fully replace the potassium. Patient is refusing Potassium through all avenues, by mouth, peg tube and PIV. Every route I have tried she complains of discomfort, nausea or pain and she is unable to tolerate. Dr. Leon has been notified.

## 2025-03-21 NOTE — ASSESSMENT & PLAN NOTE
"This patient does have evidence of infective focus  My overall impression is sepsis.  Source: Unknown  Antibiotics given-   Antibiotics (72h ago, onward)      Start     Stop Route Frequency Ordered    03/21/25 0600  vancomycin 1,500 mg in 0.9% NaCl 250 mL IVPB (admixture device)         -- IV Every 12 hours (non-standard times) 03/20/25 1629    03/20/25 1730  piperacillin-tazobactam (ZOSYN) 4.5 g in D5W 100 mL IVPB (MB+)  (Sepsis Treatment Panel)         03/26/25 2059 IV Every 8 hours (non-standard times) 03/20/25 1620    03/20/25 1720  vancomycin - pharmacy to dose  (Sepsis Treatment Panel)        Placed in "And" Linked Group    -- IV pharmacy to manage frequency 03/20/25 1620    03/19/25 2300  mupirocin 2 % ointment         03/24/25 2059 Nasl 2 times daily 03/19/25 2148          Latest lactate reviewed-  Recent Labs   Lab 03/19/25  1405 03/19/25  1701 03/21/25  0512   LACTATE  --    < > 0.9   POCLAC 1.44  --   --     < > = values in this interval not displayed.   Fluid challenge Fluid Not Needed - Patient is not hypotensive and/or lactate is less than 4.0.           "

## 2025-03-22 LAB
ALBUMIN SERPL-MCNC: 2.5 G/DL (ref 3.5–5.2)
ALP SERPL-CCNC: 105 UNIT/L (ref 55–135)
ALT SERPL-CCNC: 23 UNIT/L (ref 10–44)
ANION GAP (SMH): 7 MMOL/L (ref 8–16)
ANISOCYTOSIS BLD QL SMEAR: SLIGHT
AST SERPL-CCNC: 20 UNIT/L (ref 10–40)
BASOPHILS NFR BLD MANUAL: 1 %
BILIRUB SERPL-MCNC: 0.3 MG/DL (ref 0.1–1)
BUN SERPL-MCNC: 8 MG/DL (ref 8–23)
CALCIUM SERPL-MCNC: 8.3 MG/DL (ref 8.7–10.5)
CHLORIDE SERPL-SCNC: 97 MMOL/L (ref 95–110)
CO2 SERPL-SCNC: 31 MMOL/L (ref 23–29)
CREAT SERPL-MCNC: 0.4 MG/DL (ref 0.5–1.4)
ERYTHROCYTE [DISTWIDTH] IN BLOOD BY AUTOMATED COUNT: 16.7 % (ref 11.5–14.5)
GFR SERPLBLD CREATININE-BSD FMLA CKD-EPI: >60 ML/MIN/1.73/M2
GLUCOSE SERPL-MCNC: 104 MG/DL (ref 70–110)
HCT VFR BLD AUTO: 29.1 % (ref 37–48.5)
HGB BLD-MCNC: 8.8 GM/DL (ref 12–16)
LYMPHOCYTES NFR BLD MANUAL: 28 % (ref 18–48)
MAGNESIUM SERPL-MCNC: 1.7 MG/DL (ref 1.6–2.6)
MCH RBC QN AUTO: 25.5 PG (ref 27–31)
MCHC RBC AUTO-ENTMCNC: 30.2 G/DL (ref 32–36)
MCV RBC AUTO: 84 FL (ref 82–98)
METAMYELOCYTES NFR BLD MANUAL: 4 %
MONOCYTES NFR BLD MANUAL: 5 % (ref 4–15)
MYELOCYTES NFR BLD MANUAL: 5 %
NEUTROPHILS NFR BLD MANUAL: 54 % (ref 38–73)
NEUTS BAND NFR BLD MANUAL: 3 %
NUCLEATED RBC (/100WBC) (SMH): 1 /100 WBC
PLATELET # BLD AUTO: 206 K/UL (ref 150–450)
PLATELET BLD QL SMEAR: NORMAL
PMV BLD AUTO: 9.1 FL (ref 9.2–12.9)
POIKILOCYTOSIS BLD QL SMEAR: SLIGHT
POLYCHROMASIA BLD QL SMEAR: NORMAL
POTASSIUM SERPL-SCNC: 4 MMOL/L (ref 3.5–5.1)
PROT SERPL-MCNC: 5.2 GM/DL (ref 6–8.4)
RBC # BLD AUTO: 3.45 M/UL (ref 4–5.4)
SODIUM SERPL-SCNC: 135 MMOL/L (ref 136–145)
VANCOMYCIN TROUGH SERPL-MCNC: 10 UG/ML (ref ?–20)
WBC # BLD AUTO: 3.92 K/UL (ref 3.9–12.7)

## 2025-03-22 PROCEDURE — 83735 ASSAY OF MAGNESIUM: CPT | Performed by: NURSE PRACTITIONER

## 2025-03-22 PROCEDURE — 94799 UNLISTED PULMONARY SVC/PX: CPT

## 2025-03-22 PROCEDURE — 80202 ASSAY OF VANCOMYCIN: CPT | Performed by: HOSPITALIST

## 2025-03-22 PROCEDURE — 63600175 PHARM REV CODE 636 W HCPCS: Performed by: HOSPITALIST

## 2025-03-22 PROCEDURE — 63600175 PHARM REV CODE 636 W HCPCS

## 2025-03-22 PROCEDURE — 63600175 PHARM REV CODE 636 W HCPCS: Performed by: NURSE PRACTITIONER

## 2025-03-22 PROCEDURE — 99900035 HC TECH TIME PER 15 MIN (STAT)

## 2025-03-22 PROCEDURE — 97161 PT EVAL LOW COMPLEX 20 MIN: CPT

## 2025-03-22 PROCEDURE — 12000002 HC ACUTE/MED SURGE SEMI-PRIVATE ROOM

## 2025-03-22 PROCEDURE — 84520 ASSAY OF UREA NITROGEN: CPT | Performed by: NURSE PRACTITIONER

## 2025-03-22 PROCEDURE — 27000207 HC ISOLATION

## 2025-03-22 PROCEDURE — 25000003 PHARM REV CODE 250

## 2025-03-22 PROCEDURE — 25000003 PHARM REV CODE 250: Performed by: NURSE PRACTITIONER

## 2025-03-22 PROCEDURE — 25000003 PHARM REV CODE 250: Performed by: HOSPITALIST

## 2025-03-22 PROCEDURE — 99900031 HC PATIENT EDUCATION (STAT)

## 2025-03-22 PROCEDURE — 85027 COMPLETE CBC AUTOMATED: CPT | Performed by: NURSE PRACTITIONER

## 2025-03-22 PROCEDURE — 94761 N-INVAS EAR/PLS OXIMETRY MLT: CPT

## 2025-03-22 PROCEDURE — 36415 COLL VENOUS BLD VENIPUNCTURE: CPT | Performed by: NURSE PRACTITIONER

## 2025-03-22 PROCEDURE — 97116 GAIT TRAINING THERAPY: CPT

## 2025-03-22 RX ORDER — CEFEPIME HYDROCHLORIDE 1 G/1
1 INJECTION, POWDER, FOR SOLUTION INTRAMUSCULAR; INTRAVENOUS
Status: DISCONTINUED | OUTPATIENT
Start: 2025-03-22 | End: 2025-03-22

## 2025-03-22 RX ORDER — CEFEPIME HYDROCHLORIDE 2 G/1
2 INJECTION, POWDER, FOR SOLUTION INTRAVENOUS
Status: DISCONTINUED | OUTPATIENT
Start: 2025-03-22 | End: 2025-03-24 | Stop reason: HOSPADM

## 2025-03-22 RX ADMIN — ONDANSETRON 8 MG: 2 INJECTION INTRAMUSCULAR; INTRAVENOUS at 09:03

## 2025-03-22 RX ADMIN — HYDROCODONE BITARTRATE AND ACETAMINOPHEN 1 TABLET: 10; 325 TABLET ORAL at 07:03

## 2025-03-22 RX ADMIN — THERA TABS 1 TABLET: TAB at 08:03

## 2025-03-22 RX ADMIN — PANTOPRAZOLE SODIUM 20 MG: 20 TABLET, DELAYED RELEASE ORAL at 05:03

## 2025-03-22 RX ADMIN — ENOXAPARIN SODIUM 40 MG: 40 INJECTION SUBCUTANEOUS at 04:03

## 2025-03-22 RX ADMIN — CEFEPIME 2 G: 2 INJECTION, POWDER, FOR SOLUTION INTRAVENOUS at 12:03

## 2025-03-22 RX ADMIN — VANCOMYCIN HYDROCHLORIDE 1250 MG: 1.25 INJECTION, POWDER, LYOPHILIZED, FOR SOLUTION INTRAVENOUS at 04:03

## 2025-03-22 RX ADMIN — CEFEPIME 2 G: 2 INJECTION, POWDER, FOR SOLUTION INTRAVENOUS at 07:03

## 2025-03-22 RX ADMIN — PIPERACILLIN SODIUM AND TAZOBACTAM SODIUM 4.5 G: 4; .5 INJECTION, POWDER, LYOPHILIZED, FOR SOLUTION INTRAVENOUS at 05:03

## 2025-03-22 RX ADMIN — VANCOMYCIN HYDROCHLORIDE 1250 MG: 1.25 INJECTION, POWDER, LYOPHILIZED, FOR SOLUTION INTRAVENOUS at 08:03

## 2025-03-22 RX ADMIN — MUPIROCIN 1 G: 20 OINTMENT TOPICAL at 08:03

## 2025-03-22 RX ADMIN — CETIRIZINE HYDROCHLORIDE 10 MG: 10 TABLET, FILM COATED ORAL at 08:03

## 2025-03-22 RX ADMIN — Medication 800 MG: at 07:03

## 2025-03-22 RX ADMIN — FAMOTIDINE 20 MG: 20 TABLET, FILM COATED ORAL at 09:03

## 2025-03-22 RX ADMIN — SCOPOLAMINE 1 PATCH: 1.5 PATCH, EXTENDED RELEASE TRANSDERMAL at 11:03

## 2025-03-22 RX ADMIN — HYDROCODONE BITARTRATE AND ACETAMINOPHEN 1 TABLET: 10; 325 TABLET ORAL at 08:03

## 2025-03-22 NOTE — ASSESSMENT & PLAN NOTE
Hyponatremia is likely due to Dehydration/hypovolemia. The patient's most recent sodium results are listed below.  Recent Labs     03/20/25  0753 03/21/25  0512 03/22/25  0453   * 136 135*     Plan  - Correct the sodium by 4-6mEq in 24 hours.   - Monitor sodium Daily.   - Patient hyponatremia is stable  - continue to monitor.

## 2025-03-22 NOTE — PROGRESS NOTES
Pharmacokinetic Assessment Follow Up: IV Vancomycin    Patient brief summary:  Kwasi Brown is a 59 y.o. female initiated on antimicrobial therapy with IV Vancomycin for treatment of Neutropenic Fever    The patient's current regimen is Vancomycin 1500 mg IV every 12 hours.       Actual Body Weight = 86 kg (189 lb 9.5 oz).    Renal Function:   Estimated Creatinine Clearance: 166.5 mL/min (A) (based on SCr of 0.4 mg/dL (L)).      Vancomycin serum concentration assessment(s):    The trough level was drawn correctly and can be used to guide therapy at this time. The measurement is below the desired definitive target range of 15 to 20 mcg/mL.    Vancomycin Regimen Plan:    Change regimen to Vancomycin 1250 mg IV every 8 hours with next serum trough concentration measured at 0000 on 03/23.    Drug levels (last 3 results):  Recent Labs   Lab Result Units 03/22/25  0453   Vancomycin Trough ug/ml 10.0       Pharmacy will continue to follow and monitor vancomycin.    Please contact pharmacy at extension [phone] for questions regarding this assessment.    Thank you for the consult,   Ashwini Wong

## 2025-03-22 NOTE — SUBJECTIVE & OBJECTIVE
Interval History:  Patient seen and examined.  Overnight, blood pressure remained stable. White count improved. Hematology Oncology following. ESR, CRP added on.  PT consulted.  Incentive spirometry encouraged.  Plan of care discussed with the patient at the bedside in detail.    Review of Systems  Objective:     Vital Signs (Most Recent):  Temp: 98.3 °F (36.8 °C) (03/22/25 1141)  Pulse: 82 (03/22/25 1402)  Resp: 15 (03/22/25 1402)  BP: 101/68 (03/22/25 1141)  SpO2: 97 % (03/22/25 1402) Vital Signs (24h Range):  Temp:  [97.9 °F (36.6 °C)-99.3 °F (37.4 °C)] 98.3 °F (36.8 °C)  Pulse:  [76-86] 82  Resp:  [15-19] 15  SpO2:  [95 %-99 %] 97 %  BP: ()/(59-74) 101/68     Weight: 86 kg (189 lb 9.5 oz)  Body mass index is 29.69 kg/m².    Intake/Output Summary (Last 24 hours) at 3/22/2025 1446  Last data filed at 3/22/2025 1200  Gross per 24 hour   Intake 888.06 ml   Output --   Net 888.06 ml         Physical Exam  Vitals and nursing note reviewed.   Constitutional:       General: She is not in acute distress.     Appearance: She is ill-appearing.   HENT:      Head:      Comments: Alopecia noted  Eyes:      Pupils: Pupils are equal, round, and reactive to light.   Cardiovascular:      Rate and Rhythm: Normal rate and regular rhythm.      Heart sounds: No murmur heard.  Pulmonary:      Effort: Pulmonary effort is normal.      Breath sounds: Normal breath sounds.   Abdominal:      General: There is no distension.      Palpations: Abdomen is soft.      Tenderness: There is no abdominal tenderness.      Comments: Percutaneous gastrostomy in place, no erythema or tenderness noted.   Skin:     Coloration: Skin is pale.      Findings: No rash.   Neurological:      Mental Status: She is alert and oriented to person, place, and time.               Significant Labs: All pertinent labs within the past 24 hours have been reviewed.    Significant Imaging: I have reviewed all pertinent imaging results/findings within the past 24  hours.

## 2025-03-22 NOTE — CARE UPDATE
03/22/25 0814   Patient Assessment/Suction   Level of Consciousness (AVPU) alert   Respiratory Effort Unlabored   Expansion/Accessory Muscles/Retractions no use of accessory muscles   All Lung Fields Breath Sounds clear;diminished   Rhythm/Pattern, Respiratory depth regular;pattern regular;unlabored   Cough Frequency no cough   PRE-TX-O2   Device (Oxygen Therapy) room air   SpO2 95 %   Pulse Oximetry Type Intermittent   $ Pulse Oximetry - Multiple Charge Pulse Oximetry - Multiple   Pulse 82   Resp 15   Aerosol Therapy   $ Aerosol Therapy Charges PRN treatment not required   Education   $ Education Bronchodilator;15 min   Respiratory Evaluation   $ Care Plan Tech Time 15 min

## 2025-03-22 NOTE — PT/OT/SLP EVAL
Physical Therapy Evaluation and Discharge Note    Patient Name:  Kwasi Brown   MRN:  [id number]    Recommendations:     Discharge Recommendations: Low Intensity Therapy  Discharge Equipment Recommendations: none   Barriers to discharge:  decreased endurance    Assessment:     Kwasi Brown is a 59 y.o. female admitted with a medical diagnosis of Febrile neutropenia. .  At this time, patient is functioning at their prior level of function and does not require further acute PT services. Pt found sitting up in bed with  present. Pt was agreeable to session at this time. Supine<>sit bed mobility SBA. Sit<>stand transfers SBA with no AD. Pt ambulated 45' within room with no AD. Rest breaks taken throughout walking trial due to decreased endurance levels. Placed raised commode over toilet for pt in order to be able to get up and off of toilet independently.    Recent Surgery: * No surgery found *      Plan:     During this hospitalization, patient does not require further acute PT services.  Please re-consult if situation changes.      Subjective     Chief Complaint: decreased energy  Patient/Family Comments/goals: return home  Pain/Comfort:  Pain Rating 1: 0/10    Patients cultural, spiritual, Restorationism conflicts given the current situation:      Living Environment:  Pt lives with  in [facility] with a threshold step to enter. Pt is home most of the day alone while  is at work.    Prior to admission, patients level of function was Independent.  Equipment used at home:  ([email or web address]).  DME owned (not currently used): none.  Upon discharge, patient will have assistance from .    Objective:     Communicated with RNRhina, prior to session.  Patient found supine with bed alarm, peripheral IV, telemetry upon PT entry to room.    General Precautions: Standard, contact, airborne, fall    Orthopedic Precautions:    Braces:    Respiratory Status: Room air    Exams:  RLE ROM: WFL  RLE Strength: WFL  LLE  ROM: WFL  LLE Strength: WFL    Functional Mobility:  Bed Mobility:     Supine to Sit: stand by assistance  Sit to Supine: stand by assistance  Transfers:     Sit to Stand:  stand by assistance with no AD  Gait: 45' within room with no AD    AM-PAC 6 CLICK MOBILITY  Total Score:24       Treatment and Education:  Spoke with pt about using call button to notify RN prior to getting out of bed. Spoke with pt about recommendation for HHPT in order to help improve her endurance levels while at home.    AM-PAC 6 CLICK MOBILITY  Total Score:24     Patient left supine with all lines intact, call button in reach, bed alarm on, RN notified, and  present.    GOALS:   Multidisciplinary Problems       Physical Therapy Goals       Not on file                    DME Justifications:  No DME recommended requiring DME justifications    History:     Past Medical History:   Diagnosis Date    Esophageal cancer 01/04/2025    Hypertension 2009    Obesity        Past Surgical History:   Procedure Laterality Date    ADENOIDECTOMY      COLONOSCOPY W/ POLYPECTOMY  1989    Age 24 - polyps negative    HYSTERECTOMY  2009    Partial Hysterectomy    INSERTION OF TUNNELED CENTRAL VENOUS CATHETER (CVC) WITH SUBCUTANEOUS PORT Left 1/30/2025    Procedure: [id number];  Surgeon: Patrick Whelan Jr., MD;  Location: [facility];  Service: General;  Laterality: Left;    INSERTION, GASTROSTOMY TUBE, LAPAROSCOPIC N/A 1/30/2025    Procedure: INSERTION, GASTROSTOMY TUBE, LAPAROSCOPIC;  Surgeon: Patrick Whelan Jr., MD;  Location: [facility];  Service: General;  Laterality: N/A;    KNEE RECONSTRUCTION, MEDIAL PATELLAR FEMORAL LIGAMENT Bilateral     left ALSO RIGHT KNEE SCOPE    TONSILLECTOMY         Time Tracking:     PT Received On: 03/22/25  PT Start Time: 1333     PT Stop Time: 1346  PT Total Time (min): 13 min     Billable Minutes: Evaluation 5 and Gait Training 8      03/22/2025

## 2025-03-22 NOTE — ASSESSMENT & PLAN NOTE
This patient is found to have pancytopenia, the likely etiology is Drug induced (including chemotherapy) related to esophageal carcinoma, will monitor CBC Daily. Will transfuse red blood cells if the hemoglobin is <7g/dL (or <8 in the setting of ACS). Will transfuse platelets if platelet count is less than 10,000. Hold DVT prophylaxis if platelets are <50k. The patient's hemoglobin, white blood cell count, and platelet count results have been reviewed and are listed below.  Recent Labs   Lab 03/22/25  0453   HGB 8.8*   WBC 3.92

## 2025-03-22 NOTE — ASSESSMENT & PLAN NOTE
Nutrition consulted. Most recent weight and BMI monitored-     Measurements:  Wt Readings from Last 1 Encounters:   03/20/25 86 kg (189 lb 9.5 oz)   Body mass index is 29.69 kg/m².    Patient has been screened and assessed by RD.    Malnutrition Type:  Context: chronic illness  Level: severe    Malnutrition Characteristic Summary:  Weight Loss (Malnutrition): greater than 5% in 1 month  Energy Intake (Malnutrition): less than or equal to 75% for greater than or equal to 1 month    Interventions/Recommendations (treatment strategy):  1. Recommend Brown diet. Provided menu and contact information for meal choices. 2. Recommend Boost + Vanilla with meals. 3. Should patient agree: Two [place]uyler HN, 240 mL, 3 times / day with Pro Source TID per PEG, after meals would provide 1605 kcal, 104 gm protein and 1494 free water. Meets 75% EEN and 100% EPN. 4. Suggest 50 mL FWF each Bolus.

## 2025-03-22 NOTE — ASSESSMENT & PLAN NOTE
Nutrition consulted. Most recent weight and BMI monitored-     Measurements:  Wt Readings from Last 1 Encounters:   03/20/25 86 kg (189 lb 9.5 oz)   Body mass index is 29.69 kg/m².    Patient has been screened and assessed by RD.    Malnutrition Type:  Context: chronic illness  Level: severe    Malnutrition Characteristic Summary:  Weight Loss (Malnutrition): greater than 5% in 1 month  Energy Intake (Malnutrition): less than or equal to 75% for greater than or equal to 1 month    Interventions/Recommendations (treatment strategy):  1. Recommend [place] diet. Provided menu and contact information for meal choices. 2. Recommend Boost + Vanilla with meals. 3. Should patient agree: Two Schuyler HN, 240 mL, 3 times / day with Pro Source TID per PEG, after meals would provide 1605 kcal, 104 gm protein and 1494 free water. Meets 75% EEN and 100% EPN. 4. Suggest 50 mL FWF each Bolus.

## 2025-03-22 NOTE — ASSESSMENT & PLAN NOTE
"This patient does have evidence of infective focus  My overall impression is sepsis.  Source: Unknown  Antibiotics given-   Antibiotics (72h ago, onward)      Start     Stop Route Frequency Ordered    03/22/25 1200  ceFEPIme injection 2 g         -- IV Every 8 hours (non-standard times) 03/22/25 1050    03/22/25 0900  vancomycin 1,250 mg in 0.9% NaCl 250 mL IVPB (admixture device)         -- IV Every 8 hours (non-standard times) 03/22/25 0640    03/20/25 1720  vancomycin - pharmacy to dose  (Sepsis Treatment Panel)        Placed in "And" Linked Group    -- IV pharmacy to manage frequency 03/20/25 1620    03/19/25 2300  mupirocin 2 % ointment         03/24/25 2059 Nasl 2 times daily 03/19/25 2148          Latest lactate reviewed-  Recent Labs   Lab 03/21/25  0512   LACTATE 0.9   Fluid challenge Fluid Not Needed - Patient is not hypotensive and/or lactate is less than 4.0.           "

## 2025-03-22 NOTE — ASSESSMENT & PLAN NOTE
The likely etiology of thrombocytopenia is bone marrow suppression due to chemotherapy. The patients 3 most recent labs are listed below.  Recent Labs     03/20/25  0753 03/21/25  0512 03/22/25  0453   * 168 206     Plan  - Will transfuse if platelet count is <10k.

## 2025-03-22 NOTE — PROGRESS NOTES
[facility] Medicine  Progress Note    Patient Name: Kwasi Brown  MRN: [id number]  Patient Class: IP- Inpatient   Admission Date: 3/19/2025  Length of Stay: 3 days  Attending Physician: Carmelita John, *  Primary Care Provider: Sierra, Primary Doctor        Subjective     Principal Problem:Febrile neutropenia        HPI:  59-year-old female with pMHx of esophageal cancer who presented to the ED with complaints elevated temperature of 101.7°.  Patient's spoke with her oncologists and P who had recommended that she come to the ED for evaluation.  Patient reports her last chemotherapy was 9 days prior.  Patient reports that she has severe pain with swallowing she finds that sometimes Maalox helps with the pain in her esophagus after eating.  Discussed starting patient on GI cocktail to help with pain of her oral potassium supplements.  Patient will be placed on airborne and contact an enhanced respiratory precautions due to her neutropenia.  Patient is borderline with an ANC of 1000.    Triage vitals with pulse 96, /69, temp 98.9°, SpO2 96% on room air.  Blood work with sodium 131, potassium 3.2, chloride 94, glucose 144, calcium 8.2, total protein 5.9, albumin 2.8, WBC 2.2, RBC 3.4, hemoglobin 9.1, hematocrit 28.6, urine negative for infection.  Blood cultures drawn, group a strep negative, chest x-ray with no acute cardiopulmonary process.    Overview/Hospital Course:  Patient was admitted for febrile neutropenia.  She was started on broad-spectrum IV antibiotic therapy, and improved over the course of her hospitalization.  Patient had some minor electrolyte abnormalities which were assessed.  Oncology was consulted to assist in plan of care. Eventually white count improved.    Interval History:  Patient seen and examined.  Overnight, blood pressure remained stable. White count improved. Hematology Oncology following. ESR, CRP added on.  PT consulted.  Incentive spirometry  encouraged.  Plan of care discussed with the patient at the bedside in detail.    Review of Systems  Objective:     Vital Signs (Most Recent):  Temp: 98.3 °F (36.8 °C) (03/22/25 1141)  Pulse: 82 (03/22/25 1402)  Resp: 15 (03/22/25 1402)  BP: 101/68 (03/22/25 1141)  SpO2: 97 % (03/22/25 1402) Vital Signs (24h Range):  Temp:  [97.9 °F (36.6 °C)-99.3 °F (37.4 °C)] 98.3 °F (36.8 °C)  Pulse:  [76-86] 82  Resp:  [15-19] 15  SpO2:  [95 %-99 %] 97 %  BP: ()/(59-74) 101/68     Weight: 86 kg (189 lb 9.5 oz)  Body mass index is 29.69 kg/m².    Intake/Output Summary (Last 24 hours) at 3/22/2025 1446  Last data filed at 3/22/2025 1200  Gross per 24 hour   Intake 888.06 ml   Output --   Net 888.06 ml         Physical Exam  Vitals and nursing note reviewed.   Constitutional:       General: She is not in acute distress.     Appearance: She is ill-appearing.   HENT:      Head:      Comments: Alopecia noted  Eyes:      Pupils: Pupils are equal, round, and reactive to light.   Cardiovascular:      Rate and Rhythm: Normal rate and regular rhythm.      Heart sounds: No murmur heard.  Pulmonary:      Effort: Pulmonary effort is normal.      Breath sounds: Normal breath sounds.   Abdominal:      General: There is no distension.      Palpations: Abdomen is soft.      Tenderness: There is no abdominal tenderness.      Comments: Percutaneous gastrostomy in place, no erythema or tenderness noted.   Skin:     Coloration: Skin is pale.      Findings: No rash.   Neurological:      Mental Status: She is alert and oriented to person, place, and time.               Significant Labs: All pertinent labs within the past 24 hours have been reviewed.    Significant Imaging: I have reviewed all pertinent imaging results/findings within the past 24 hours.      Assessment & Plan  Febrile neutropenia  This patient does have evidence of infective focus  My overall impression is sepsis.  Source: Unknown  Antibiotics given-   Antibiotics (72h ago,  "onward)      Start     Stop Route Frequency Ordered    03/22/25 1200  ceFEPIme injection 2 g         -- IV Every 8 hours (non-standard times) 03/22/25 1050    03/22/25 0900  vancomycin 1,250 mg in 0.9% NaCl 250 mL IVPB (admixture device)         -- IV Every 8 hours (non-standard times) 03/22/25 0640    03/20/25 1720  vancomycin - pharmacy to dose  (Sepsis Treatment Panel)        Placed in "And" Linked Group    -- IV pharmacy to manage frequency 03/20/25 1620    03/19/25 2300  mupirocin 2 % ointment         03/24/25 2059 Nasl 2 times daily 03/19/25 2148          Latest lactate reviewed-  Recent Labs   Lab 03/21/25  0512   LACTATE 0.9   Fluid challenge Fluid Not Needed - Patient is not hypotensive and/or lactate is less than 4.0.           Pancytopenia  This patient is found to have pancytopenia, the likely etiology is Drug induced (including chemotherapy) related to esophageal carcinoma , will monitor CBC Daily. Will transfuse red blood cells if the hemoglobin is <7g/dL (or <8 in the setting of ACS). Will transfuse platelets if platelet count is less than 10,000. Hold DVT prophylaxis if platelets are <50k. The patient's hemoglobin, white blood cell count, and platelet count results have been reviewed and are listed below.  Recent Labs   Lab 03/22/25  0453   HGB 8.8*   WBC 3.92              Dyslipidemia  Patient is on fish oil at home we will resume on discharge  Malignant neoplasm of abdominal esophagus  Noted  Continue Zofran p.r.n. nausea   Continue Ativan  may continue scopolamine if patient with excessive oral secretions    Hypokalemia  Patient's most recent potassium results are listed below.   Recent Labs     03/20/25  0753 03/21/25  0512 03/22/25  0453   K 3.1* 3.4* 4.0     Plan  - Replete potassium per protocol  - Monitor potassium Daily  - Patient's hypokalemia is improving  - please give patient GI cocktail prior to giving potassium supplements due to pain    Anemia  Anemia is likely due to chronic " disease due to Malignancy. Most recent hemoglobin and hematocrit are listed below.  Recent Labs     03/20/25  0753 03/21/25  0512 03/22/25  0453   HGB 8.6* 8.8* 8.8*   HCT 27.9* 28.5* 29.1*     Plan  - Monitor serial CBC: Daily  - Transfuse PRBC if patient becomes hemodynamically unstable, symptomatic or H/H drops below 7/21.  - Patient has not received any PRBC transfusions to date  - Patient's anemia is currently worsening. Will monitor    Hyponatremia  Hyponatremia is likely due to Dehydration/hypovolemia. The patient's most recent sodium results are listed below.  Recent Labs     03/20/25  0753 03/21/25  0512 03/22/25  0453   * 136 135*     Plan  - Correct the sodium by 4-6mEq in 24 hours.   - Monitor sodium Daily.   - Patient hyponatremia is stable  - continue to monitor.    Malnutrition of mild degree  Nutrition consulted. Most recent weight and BMI monitored-     Measurements:  Wt Readings from Last 1 Encounters:   03/20/25 86 kg (189 lb 9.5 oz)   Body mass index is 29.69 kg/m².    Patient has been screened and assessed by RD.    Malnutrition Type:  Context: chronic illness  Level: severe    Malnutrition Characteristic Summary:  Weight Loss (Malnutrition): greater than 5% in 1 month  Energy Intake (Malnutrition): less than or equal to 75% for greater than or equal to 1 month    Interventions/Recommendations (treatment strategy):  1. Recommend [place] diet. Provided menu and contact information for meal choices. 2. Recommend Boost + Vanilla with meals. 3. Should patient agree: Two Schuyler HN, 240 mL, 3 times / day with Pro Source TID per PEG, after meals would provide 1605 kcal, 104 gm protein and 1494 free water. Meets 75% EEN and 100% EPN. 4. Suggest 50 mL FWF each Bolus.      Thrombocytopenia  The likely etiology of thrombocytopenia is bone marrow suppression due to chemotherapy . The patients 3 most recent labs are listed below.  Recent Labs     03/20/25  0753 03/21/25  0512 03/22/25  0453   * 168  206     Plan  - Will transfuse if platelet count is <10k.      Severe malnutrition  Nutrition consulted. Most recent weight and BMI monitored-     Measurements:  Wt Readings from Last 1 Encounters:   03/20/25 86 kg (189 lb 9.5 oz)   Body mass index is 29.69 kg/m².    Patient has been screened and assessed by RD.    Malnutrition Type:  Context: chronic illness  Level: severe    Malnutrition Characteristic Summary:  Weight Loss (Malnutrition): greater than 5% in 1 month  Energy Intake (Malnutrition): less than or equal to 75% for greater than or equal to 1 month    Interventions/Recommendations (treatment strategy):  1. Recommend [place] diet. Provided menu and contact information for meal choices. 2. Recommend Boost + Vanilla with meals. 3. Should patient agree: Two Schuyler HN, 240 mL, 3 times / day with Pro Source TID per PEG, after meals would provide 1605 kcal, 104 gm protein and 1494 free water. Meets 75% EEN and 100% EPN. 4. Suggest 50 mL FWF each Bolus.    VTE Risk Mitigation (From admission, onward)           Ordered     enoxaparin injection 40 mg  Daily         03/19/25 2146     IP VTE HIGH RISK PATIENT  Once         03/19/25 2146     Place sequential compression device  Until discontinued         03/19/25 2146                    Discharge Planning   CATHY: 3/22/2025     Code Status: Full Code   Medical Readiness for Discharge Date:   Discharge Plan A: Home with family                        Carmelita John MD  Department of Hospital Medicine   [facility]

## 2025-03-22 NOTE — ASSESSMENT & PLAN NOTE
Anemia is likely due to chronic disease due to Malignancy. Most recent hemoglobin and hematocrit are listed below.  Recent Labs     03/20/25  0753 03/21/25  0512 03/22/25  0453   HGB 8.6* 8.8* 8.8*   HCT 27.9* 28.5* 29.1*     Plan  - Monitor serial CBC: Daily  - Transfuse PRBC if patient becomes hemodynamically unstable, symptomatic or H/H drops below 7/21.  - Patient has not received any PRBC transfusions to date  - Patient's anemia is currently worsening. Will monitor

## 2025-03-22 NOTE — PROGRESS NOTES
Pharmacist Renal Dose Adjustment Note    Kwasi Brown is a 59 y.o. female being treated with the medication Cefepime.    Patient Data:    Vital Signs (Most Recent):  Temp: 99 °F (37.2 °C) (03/22/25 0822)  Pulse: 78 (03/22/25 0822)  Resp: 16 (03/22/25 0835)  BP: 108/60 (03/22/25 0822)  SpO2: 96 % (03/22/25 0822) Vital Signs (72h Range):  Temp:  [97.9 °F (36.6 °C)-99.3 °F (37.4 °C)]   Pulse:  [73-96]   Resp:  [8-31]   BP: ()/(59-90)   SpO2:  [95 %-100 %]      Recent Labs   Lab 03/20/25  0753 03/21/25  0512 03/22/25  0453   CREATININE 0.5 0.4* 0.4*     Serum creatinine: 0.4 mg/dL (L) 03/22/25 0453  Estimated creatinine clearance: 166.5 mL/min (A)    Cefepime 1 gram IV every 6 hours will be changed to Cefepime 2 grams IV every 8 hours due to Hospital protocol.    Pharmacist's Name: Mindi Charles  Pharmacist's Extension: [phone]

## 2025-03-22 NOTE — CARE UPDATE
03/21/25 2035   Patient Assessment/Suction   Level of Consciousness (AVPU) alert   Respiratory Effort Unlabored   PRE-TX-O2   Device (Oxygen Therapy) room air   SpO2 98 %   Pulse Oximetry Type Intermittent   $ Pulse Oximetry - Multiple Charge Pulse Oximetry - Multiple   Pulse 76   Resp 16   Positioning HOB elevated 30 degrees   Aerosol Therapy   $ Aerosol Therapy Charges PRN treatment not required

## 2025-03-22 NOTE — ASSESSMENT & PLAN NOTE
Patient's most recent potassium results are listed below.   Recent Labs     03/20/25  0753 03/21/25  0512 03/22/25  0453   K 3.1* 3.4* 4.0     Plan  - Replete potassium per protocol  - Monitor potassium Daily  - Patient's hypokalemia is improving  - please give patient GI cocktail prior to giving potassium supplements due to pain

## 2025-03-22 NOTE — CARE UPDATE
03/22/25 1402   PRE-TX-O2   Device (Oxygen Therapy) room air   SpO2 97 %   Pulse 82   Resp 15   Incentive Spirometer   $ Incentive Spirometer Charges done with encouragement   Incentive Spirometer Predicted Level (mL) 2000   Administration (IS) instruction provided, initial   Number of Repetitions (IS) 10   Level Incentive Spirometer (mL) 1500   Patient Tolerance (IS) good

## 2025-03-23 LAB
ALBUMIN SERPL-MCNC: 2.5 G/DL (ref 3.5–5.2)
ALP SERPL-CCNC: 114 UNIT/L (ref 55–135)
ALT SERPL-CCNC: 28 UNIT/L (ref 10–44)
ANION GAP (SMH): 9 MMOL/L (ref 8–16)
ANISOCYTOSIS BLD QL SMEAR: SLIGHT
AST SERPL-CCNC: 35 UNIT/L (ref 10–40)
BILIRUB SERPL-MCNC: 0.3 MG/DL (ref 0.1–1)
BUN SERPL-MCNC: 12 MG/DL (ref 8–23)
C-REACTIVE PROTEIN (SMH): 12.6 MG/DL
CALCIUM SERPL-MCNC: 8.4 MG/DL (ref 8.7–10.5)
CHLORIDE SERPL-SCNC: 100 MMOL/L (ref 95–110)
CO2 SERPL-SCNC: 30 MMOL/L (ref 23–29)
CREAT SERPL-MCNC: 0.9 MG/DL (ref 0.5–1.4)
ERYTHROCYTE [DISTWIDTH] IN BLOOD BY AUTOMATED COUNT: 17.2 % (ref 11.5–14.5)
ERYTHROCYTE [SEDIMENTATION RATE] IN BLOOD: 54 MM/HR (ref 0–20)
GFR SERPLBLD CREATININE-BSD FMLA CKD-EPI: >60 ML/MIN/1.73/M2
GLUCOSE SERPL-MCNC: 107 MG/DL (ref 70–110)
HCT VFR BLD AUTO: 28.5 % (ref 37–48.5)
HGB BLD-MCNC: 8.6 GM/DL (ref 12–16)
LYMPHOCYTES NFR BLD MANUAL: 12 % (ref 18–48)
MAGNESIUM SERPL-MCNC: 1.8 MG/DL (ref 1.6–2.6)
MCH RBC QN AUTO: 25.5 PG (ref 27–31)
MCHC RBC AUTO-ENTMCNC: 30.2 G/DL (ref 32–36)
MCV RBC AUTO: 85 FL (ref 82–98)
MONOCYTES NFR BLD MANUAL: 2 % (ref 4–15)
NEUTROPHILS NFR BLD MANUAL: 81 % (ref 38–73)
NEUTS BAND NFR BLD MANUAL: 5 %
NUCLEATED RBC (/100WBC) (SMH): 1 /100 WBC
PHOSPHATE SERPL-MCNC: 3.8 MG/DL (ref 2.7–4.5)
PLATELET # BLD AUTO: 203 K/UL (ref 150–450)
PLATELET BLD QL SMEAR: ABNORMAL
PMV BLD AUTO: 8.8 FL (ref 9.2–12.9)
POTASSIUM SERPL-SCNC: 3.9 MMOL/L (ref 3.5–5.1)
PROT SERPL-MCNC: 5.4 GM/DL (ref 6–8.4)
RBC # BLD AUTO: 3.37 M/UL (ref 4–5.4)
SODIUM SERPL-SCNC: 139 MMOL/L (ref 136–145)
VANCOMYCIN SERPL-MCNC: 12.9 UG/ML (ref ?–80)
VANCOMYCIN SERPL-MCNC: 18.4 UG/ML (ref ?–80)
VANCOMYCIN TROUGH SERPL-MCNC: 24.5 UG/ML (ref ?–20)
WBC # BLD AUTO: 5.59 K/UL (ref 3.9–12.7)

## 2025-03-23 PROCEDURE — 83735 ASSAY OF MAGNESIUM: CPT | Performed by: NURSE PRACTITIONER

## 2025-03-23 PROCEDURE — 63600175 PHARM REV CODE 636 W HCPCS: Performed by: NURSE PRACTITIONER

## 2025-03-23 PROCEDURE — 84075 ASSAY ALKALINE PHOSPHATASE: CPT | Performed by: NURSE PRACTITIONER

## 2025-03-23 PROCEDURE — 86140 C-REACTIVE PROTEIN: CPT

## 2025-03-23 PROCEDURE — 63600175 PHARM REV CODE 636 W HCPCS: Performed by: HOSPITALIST

## 2025-03-23 PROCEDURE — 85651 RBC SED RATE NONAUTOMATED: CPT

## 2025-03-23 PROCEDURE — 94761 N-INVAS EAR/PLS OXIMETRY MLT: CPT

## 2025-03-23 PROCEDURE — 85007 BL SMEAR W/DIFF WBC COUNT: CPT | Performed by: NURSE PRACTITIONER

## 2025-03-23 PROCEDURE — 27000207 HC ISOLATION

## 2025-03-23 PROCEDURE — 25000003 PHARM REV CODE 250

## 2025-03-23 PROCEDURE — 36415 COLL VENOUS BLD VENIPUNCTURE: CPT | Performed by: NURSE PRACTITIONER

## 2025-03-23 PROCEDURE — 80202 ASSAY OF VANCOMYCIN: CPT

## 2025-03-23 PROCEDURE — 94799 UNLISTED PULMONARY SVC/PX: CPT

## 2025-03-23 PROCEDURE — 84100 ASSAY OF PHOSPHORUS: CPT

## 2025-03-23 PROCEDURE — 12000002 HC ACUTE/MED SURGE SEMI-PRIVATE ROOM

## 2025-03-23 PROCEDURE — 25000003 PHARM REV CODE 250: Performed by: HOSPITALIST

## 2025-03-23 PROCEDURE — 63600175 PHARM REV CODE 636 W HCPCS

## 2025-03-23 PROCEDURE — 36415 COLL VENOUS BLD VENIPUNCTURE: CPT

## 2025-03-23 PROCEDURE — 99900035 HC TECH TIME PER 15 MIN (STAT)

## 2025-03-23 PROCEDURE — 25000003 PHARM REV CODE 250: Performed by: NURSE PRACTITIONER

## 2025-03-23 RX ORDER — FLUTICASONE PROPIONATE 50 MCG
1 SPRAY, SUSPENSION (ML) NASAL DAILY PRN
Status: DISCONTINUED | OUTPATIENT
Start: 2025-03-23 | End: 2025-03-24 | Stop reason: HOSPADM

## 2025-03-23 RX ORDER — PANTOPRAZOLE SODIUM 40 MG/1
40 TABLET, DELAYED RELEASE ORAL
Status: DISCONTINUED | OUTPATIENT
Start: 2025-03-24 | End: 2025-03-24 | Stop reason: HOSPADM

## 2025-03-23 RX ORDER — AZELASTINE 1 MG/ML
2 SPRAY, METERED NASAL DAILY PRN
Status: DISCONTINUED | OUTPATIENT
Start: 2025-03-23 | End: 2025-03-24 | Stop reason: HOSPADM

## 2025-03-23 RX ORDER — FAMOTIDINE 20 MG/1
80 TABLET, FILM COATED ORAL NIGHTLY
Status: DISCONTINUED | OUTPATIENT
Start: 2025-03-23 | End: 2025-03-24 | Stop reason: HOSPADM

## 2025-03-23 RX ADMIN — ALUMINUM HYDROXIDE, MAGNESIUM HYDROXIDE, AND SIMETHICONE 30 ML: 200; 200; 20 SUSPENSION ORAL at 04:03

## 2025-03-23 RX ADMIN — HYDROCODONE BITARTRATE AND ACETAMINOPHEN 1 TABLET: 10; 325 TABLET ORAL at 08:03

## 2025-03-23 RX ADMIN — CETIRIZINE HYDROCHLORIDE 10 MG: 10 TABLET, FILM COATED ORAL at 08:03

## 2025-03-23 RX ADMIN — FAMOTIDINE 80 MG: 20 TABLET, FILM COATED ORAL at 08:03

## 2025-03-23 RX ADMIN — THERA TABS 1 TABLET: TAB at 08:03

## 2025-03-23 RX ADMIN — VANCOMYCIN HYDROCHLORIDE 1250 MG: 1.25 INJECTION, POWDER, LYOPHILIZED, FOR SOLUTION INTRAVENOUS at 12:03

## 2025-03-23 RX ADMIN — CEFEPIME 2 G: 2 INJECTION, POWDER, FOR SOLUTION INTRAVENOUS at 08:03

## 2025-03-23 RX ADMIN — MUPIROCIN 1 G: 20 OINTMENT TOPICAL at 08:03

## 2025-03-23 RX ADMIN — ONDANSETRON 8 MG: 2 INJECTION INTRAMUSCULAR; INTRAVENOUS at 01:03

## 2025-03-23 RX ADMIN — ENOXAPARIN SODIUM 40 MG: 40 INJECTION SUBCUTANEOUS at 05:03

## 2025-03-23 RX ADMIN — PANTOPRAZOLE SODIUM 20 MG: 20 TABLET, DELAYED RELEASE ORAL at 05:03

## 2025-03-23 RX ADMIN — CEFEPIME 2 G: 2 INJECTION, POWDER, FOR SOLUTION INTRAVENOUS at 04:03

## 2025-03-23 RX ADMIN — CEFEPIME 2 G: 2 INJECTION, POWDER, FOR SOLUTION INTRAVENOUS at 12:03

## 2025-03-23 RX ADMIN — AZELASTINE HYDROCHLORIDE 274 MCG: 137 SPRAY, METERED NASAL at 08:03

## 2025-03-23 RX ADMIN — VANCOMYCIN HYDROCHLORIDE 1250 MG: 1.25 INJECTION, POWDER, LYOPHILIZED, FOR SOLUTION INTRAVENOUS at 09:03

## 2025-03-23 RX ADMIN — HYDROCODONE BITARTRATE AND ACETAMINOPHEN 1 TABLET: 10; 325 TABLET ORAL at 01:03

## 2025-03-23 NOTE — ASSESSMENT & PLAN NOTE
Patient's most recent potassium results are listed below.   Recent Labs     03/21/25  0512 03/22/25  0453 03/23/25  0454   K 3.4* 4.0 3.9     Plan  - Replete potassium per protocol  - Monitor potassium Daily  - Patient's hypokalemia is improving  - please give patient GI cocktail prior to giving potassium supplements due to pain

## 2025-03-23 NOTE — RESPIRATORY THERAPY
03/23/25 1420   Patient Assessment/Suction   Level of Consciousness (AVPU) alert   Respiratory Effort Normal;Unlabored   Expansion/Accessory Muscles/Retractions no use of accessory muscles   All Lung Fields Breath Sounds clear   Rhythm/Pattern, Respiratory unlabored;pattern regular;depth regular;no shortness of breath reported   PRE-TX-O2   Device (Oxygen Therapy) room air   Aerosol Therapy   $ Aerosol Therapy Charges PRN treatment not required

## 2025-03-23 NOTE — ASSESSMENT & PLAN NOTE
Anemia is likely due to chronic disease due to Malignancy. Most recent hemoglobin and hematocrit are listed below.  Recent Labs     03/21/25  0512 03/22/25  0453 03/23/25  0454   HGB 8.8* 8.8* 8.6*   HCT 28.5* 29.1* 28.5*     Plan  - Monitor serial CBC: Daily  - Transfuse PRBC if patient becomes hemodynamically unstable, symptomatic or H/H drops below 7/21.  - Patient has not received any PRBC transfusions to date  - Patient's anemia is currently worsening. Will monitor

## 2025-03-23 NOTE — ASSESSMENT & PLAN NOTE
"White count improving since 03/22  My overall impression is sepsis.  Source: Unknown  Antibiotics given-   Antibiotics (72h ago, onward)      Start     Stop Route Frequency Ordered    03/22/25 1200  ceFEPIme injection 2 g         -- IV Every 8 hours (non-standard times) 03/22/25 1050    03/20/25 1720  vancomycin - pharmacy to dose  (Sepsis Treatment Panel)        Placed in "And" Linked Group    -- IV pharmacy to manage frequency 03/20/25 1620    03/19/25 2300  mupirocin 2 % ointment         03/24/25 2059 Nasl 2 times daily 03/19/25 2148          Latest lactate reviewed-  Recent Labs   Lab 03/21/25  0512   LACTATE 0.9   Fluid challenge Fluid Not Needed - Patient is not hypotensive and/or lactate is less than 4.0.       Discontinue vancomycin with a level and no mucositis  Continue cefepime    "

## 2025-03-23 NOTE — NURSING
Vancomycin level drawn 0005 and vancomycin 1,250 mg hung at 0037.  Vanc level was 24.5.  [facility] turned off at 0100.  83 ml infused.   Pharmacy notified;... Pharmacy ordered a vancomycin level to be drawn 8AM on 3/23/2025.

## 2025-03-23 NOTE — PROGRESS NOTES
Pharmacokinetic Assessment Follow Up: IV Vancomycin    Patient brief summary:  Kwasi Brown is a 59 y.o. female initiated on antimicrobial therapy with IV Vancomycin for treatment of Sepsis    The patient's current regimen is Vancomycin 1250 mg every 8 hours.       Actual Body Weight = 86 kg (189 lb 9.5 oz).    Renal Function:   Estimated Creatinine Clearance: 166.5 mL/min (A) (based on SCr of 0.4 mg/dL (L)).      Vancomycin serum concentration assessment(s):    The random level was drawn correctly and can be used to guide therapy at this time. The measurement is above the desired definitive target range of 15 to 20 mcg/mL.    Vancomycin Regimen Plan:    Discontinue the scheduled vancomycin regimen and re-dose when the random level is less than 20 mcg/mL, next level to be drawn at 0800 on 03/23.    Drug levels (last 3 results):  Recent Labs   Lab Result Units 03/23/25  0005   Vancomycin Trough ug/ml 24.5*       Pharmacy will continue to follow and monitor vancomycin.    Please contact pharmacy at extension [phone] for questions regarding this assessment.    Thank you for the consult,   Ashwini Wong

## 2025-03-23 NOTE — SUBJECTIVE & OBJECTIVE
Interval History:  Patient seen and examined. ESR, CRP still elevated.  PT consulted and recommended low intensity therapy.  Incentive spirometry encouraged.  Plan of care discussed with the patient at the bedside in detail.    Review of Systems  Objective:     Vital Signs (Most Recent):  Temp: 97.9 °F (36.6 °C) (03/23/25 0753)  Pulse: 86 (03/23/25 0753)  Resp: 18 (03/23/25 0753)  BP: 109/69 (03/23/25 0753)  SpO2: 96 % (03/23/25 0753) Vital Signs (24h Range):  Temp:  [97.7 °F (36.5 °C)-98.5 °F (36.9 °C)] 97.9 °F (36.6 °C)  Pulse:  [68-92] 86  Resp:  [15-19] 18  SpO2:  [93 %-98 %] 96 %  BP: (101-116)/(68-76) 109/69     Weight: 86 kg (189 lb 9.5 oz)  Body mass index is 29.69 kg/m².    Intake/Output Summary (Last 24 hours) at 3/23/2025 0906  Last data filed at 3/23/2025 0104  Gross per 24 hour   Intake 323 ml   Output --   Net 323 ml         Physical Exam  Vitals and nursing note reviewed.   Constitutional:       General: She is not in acute distress.     Appearance: She is not ill-appearing.   HENT:      Head:      Comments: Alopecia noted  Eyes:      Pupils: Pupils are equal, round, and reactive to light.   Cardiovascular:      Rate and Rhythm: Normal rate and regular rhythm.      Heart sounds: No murmur heard.  Pulmonary:      Effort: Pulmonary effort is normal.      Breath sounds: Normal breath sounds.   Abdominal:      General: There is no distension.      Palpations: Abdomen is soft.      Tenderness: There is no abdominal tenderness.      Comments: Percutaneous gastrostomy in place, no erythema or tenderness noted.   Skin:     Coloration: Skin is pale.      Findings: No rash.   Neurological:      Mental Status: She is alert and oriented to person, place, and time.               Significant Labs: All pertinent labs within the past 24 hours have been reviewed.    Significant Imaging: I have reviewed all pertinent imaging results/findings within the past 24 hours.

## 2025-03-23 NOTE — RESPIRATORY THERAPY
03/23/25 1419   Patient Assessment/Suction   Level of Consciousness (AVPU) alert   Respiratory Effort Normal;Unlabored   Expansion/Accessory Muscles/Retractions no use of accessory muscles;expansion symmetric;no retractions   Rhythm/Pattern, Respiratory periodic breathing;unlabored;pattern regular;depth regular;no shortness of breath reported   PRE-TX-O2   Device (Oxygen Therapy) room air   SpO2 96 %   Pulse Oximetry Type Intermittent   $ Pulse Oximetry - Multiple Charge Pulse Oximetry - Multiple   Pulse 80   Resp 17   Incentive Spirometer   $ Incentive Spirometer Charges done with encouragement   Incentive Spirometer Predicted Level (mL) 1500   Administration (IS) instruction provided, follow-up

## 2025-03-23 NOTE — PROGRESS NOTES
[facility]  Progress Note    Patient Name: Kwasi Brown  MRN: [id number]  Patient Class: IP- Inpatient   Admission Date: 3/19/2025  Length of Stay: 4 days  Attending Physician: Carmelita John, *  Primary Care Provider: Sierra, Primary Doctor        Subjective     Principal Problem:Febrile neutropenia        HPI:  59-year-old female with pMHx of esophageal cancer who presented to the ED with complaints elevated temperature of 101.7°.  Patient's spoke with her oncologists and P who had recommended that she come to the ED for evaluation.  Patient reports her last chemotherapy was 9 days prior.  Patient reports that she has severe pain with swallowing she finds that sometimes Maalox helps with the pain in her esophagus after eating.  Discussed starting patient on GI cocktail to help with pain of her oral potassium supplements.  Patient will be placed on airborne and contact an enhanced respiratory precautions due to her neutropenia.  Patient is borderline with an ANC of 1000.    Triage vitals with pulse 96, /69, temp 98.9°, SpO2 96% on room air.  Blood work with sodium 131, potassium 3.2, chloride 94, glucose 144, calcium 8.2, total protein 5.9, albumin 2.8, WBC 2.2, RBC 3.4, hemoglobin 9.1, hematocrit 28.6, urine negative for infection.  Blood cultures drawn, group a strep negative, chest x-ray with no acute cardiopulmonary process.    Overview/Hospital Course:  Patient was admitted for febrile neutropenia.  She was started on broad-spectrum IV antibiotic therapy, and improved over the course of her hospitalization.  Patient had some minor electrolyte abnormalities which were assessed.  Oncology was consulted to assist in plan of care. Eventually white count improved.  ESR CRP were elevated and antibiotics IV cefepime was continued for 48 hours after neutrophil count improved.    Interval History:  Patient seen and examined. ESR, CRP still elevated.  PT consulted and  recommended low intensity therapy.  Incentive spirometry encouraged.  Plan of care discussed with the patient at the bedside in detail.    Review of Systems  Objective:     Vital Signs (Most Recent):  Temp: 97.9 °F (36.6 °C) (03/23/25 0753)  Pulse: 86 (03/23/25 0753)  Resp: 18 (03/23/25 0753)  BP: 109/69 (03/23/25 0753)  SpO2: 96 % (03/23/25 0753) Vital Signs (24h Range):  Temp:  [97.7 °F (36.5 °C)-98.5 °F (36.9 °C)] 97.9 °F (36.6 °C)  Pulse:  [68-92] 86  Resp:  [15-19] 18  SpO2:  [93 %-98 %] 96 %  BP: (101-116)/(68-76) 109/69     Weight: 86 kg (189 lb 9.5 oz)  Body mass index is 29.69 kg/m².    Intake/Output Summary (Last 24 hours) at 3/23/2025 0906  Last data filed at 3/23/2025 0104  Gross per 24 hour   Intake 323 ml   Output --   Net 323 ml         Physical Exam  Vitals and nursing note reviewed.   Constitutional:       General: She is not in acute distress.     Appearance: She is not ill-appearing.   HENT:      Head:      Comments: Alopecia noted  Eyes:      Pupils: Pupils are equal, round, and reactive to light.   Cardiovascular:      Rate and Rhythm: Normal rate and regular rhythm.      Heart sounds: No murmur heard.  Pulmonary:      Effort: Pulmonary effort is normal.      Breath sounds: Normal breath sounds.   Abdominal:      General: There is no distension.      Palpations: Abdomen is soft.      Tenderness: There is no abdominal tenderness.      Comments: Percutaneous gastrostomy in place, no erythema or tenderness noted.   Skin:     Coloration: Skin is pale.      Findings: No rash.   Neurological:      Mental Status: She is alert and oriented to person, place, and time.               Significant Labs: All pertinent labs within the past 24 hours have been reviewed.    Significant Imaging: I have reviewed all pertinent imaging results/findings within the past 24 hours.      Assessment & Plan  Febrile neutropenia  White count improving since 03/22  My overall impression is sepsis.  Source:  "Unknown  Antibiotics given-   Antibiotics (72h ago, onward)      Start     Stop Route Frequency Ordered    03/22/25 1200  ceFEPIme injection 2 g         -- IV Every 8 hours (non-standard times) 03/22/25 1050    03/20/25 1720  vancomycin - pharmacy to dose  (Sepsis Treatment Panel)        Placed in "And" Linked Group    -- IV pharmacy to manage frequency 03/20/25 1620    03/19/25 2300  mupirocin 2 % ointment         03/24/25 2059 Nasl 2 times daily 03/19/25 2148          Latest lactate reviewed-  Recent Labs   Lab 03/21/25  0512   LACTATE 0.9   Fluid challenge Fluid Not Needed - Patient is not hypotensive and/or lactate is less than 4.0.       Discontinue vancomycin with a level and no mucositis  Continue cefepime    Pancytopenia  This patient is found to have pancytopenia, the likely etiology is Drug induced (including chemotherapy) related to esophageal carcinoma , will monitor CBC Daily. Will transfuse red blood cells if the hemoglobin is <7g/dL (or <8 in the setting of ACS). Will transfuse platelets if platelet count is less than 10,000. Hold DVT prophylaxis if platelets are <50k. The patient's hemoglobin, white blood cell count, and platelet count results have been reviewed and are listed below.  Recent Labs   Lab 03/23/25  0454   HGB 8.6*   WBC 5.59              Dyslipidemia  Patient is on fish oil at home we will resume on discharge  Malignant neoplasm of abdominal esophagus  Noted  Continue Zofran p.r.n. nausea   Continue Ativan  may continue scopolamine if patient with excessive oral secretions    Hypokalemia  Patient's most recent potassium results are listed below.   Recent Labs     03/21/25  0512 03/22/25  0453 03/23/25  0454   K 3.4* 4.0 3.9     Plan  - Replete potassium per protocol  - Monitor potassium Daily  - Patient's hypokalemia is improving  - please give patient GI cocktail prior to giving potassium supplements due to pain    Anemia  Anemia is likely due to chronic disease due to " Malignancy. Most recent hemoglobin and hematocrit are listed below.  Recent Labs     03/21/25  0512 03/22/25 0453 03/23/25 0454   HGB 8.8* 8.8* 8.6*   HCT 28.5* 29.1* 28.5*     Plan  - Monitor serial CBC: Daily  - Transfuse PRBC if patient becomes hemodynamically unstable, symptomatic or H/H drops below 7/21.  - Patient has not received any PRBC transfusions to date  - Patient's anemia is currently worsening. Will monitor    Hyponatremia  Hyponatremia is likely due to Dehydration/hypovolemia. The patient's most recent sodium results are listed below.  Recent Labs     03/21/25  0512 03/22/25 0453 03/23/25 0454    135* 139     Plan  - Correct the sodium by 4-6mEq in 24 hours.   - Monitor sodium Daily.   - Patient hyponatremia is stable  - continue to monitor.    Malnutrition of mild degree  Nutrition consulted. Most recent weight and BMI monitored-     Measurements:  Wt Readings from Last 1 Encounters:   03/20/25 86 kg (189 lb 9.5 oz)   Body mass index is 29.69 kg/m².    Patient has been screened and assessed by RD.    Malnutrition Type:  Context: chronic illness  Level: severe    Malnutrition Characteristic Summary:  Weight Loss (Malnutrition): greater than 5% in 1 month  Energy Intake (Malnutrition): less than or equal to 75% for greater than or equal to 1 month    Interventions/Recommendations (treatment strategy):  1. Recommend [place] diet. Provided menu and contact information for meal choices. 2. Recommend Boost + Vanilla with meals. 3. Should patient agree: Two Schuyler HN, 240 mL, 3 times / day with Pro Source TID per PEG, after meals would provide 1605 kcal, 104 gm protein and 1494 free water. Meets 75% EEN and 100% EPN. 4. Suggest 50 mL FWF each Bolus.      Thrombocytopenia  The likely etiology of thrombocytopenia is bone marrow suppression due to chemotherapy . The patients 3 most recent labs are listed below.  Recent Labs     03/21/25  0512 03/22/25 0453 03/23/25 0454    206 203     Plan  -  Will transfuse if platelet count is <10k.      Severe malnutrition  Nutrition consulted. Most recent weight and BMI monitored-     Measurements:  Wt Readings from Last 1 Encounters:   03/20/25 86 kg (189 lb 9.5 oz)   Body mass index is 29.69 kg/m².    Patient has been screened and assessed by RD.    Malnutrition Type:  Context: chronic illness  Level: severe    Malnutrition Characteristic Summary:  Weight Loss (Malnutrition): greater than 5% in 1 month  Energy Intake (Malnutrition): less than or equal to 75% for greater than or equal to 1 month    Interventions/Recommendations (treatment strategy):  1. Recommend [place] diet. Provided menu and contact information for meal choices. 2. Recommend Boost + Vanilla with meals. 3. Should patient agree: Two Schuyler HN, 240 mL, 3 times / day with Pro Source TID per PEG, after meals would provide 1605 kcal, 104 gm protein and 1494 free water. Meets 75% EEN and 100% EPN. 4. Suggest 50 mL FWF each Bolus.    VTE Risk Mitigation (From admission, onward)           Ordered     enoxaparin injection 40 mg  Daily         03/19/25 2146     IP VTE HIGH RISK PATIENT  Once         03/19/25 2146     Place sequential compression device  Until discontinued         03/19/25 2146                    Discharge Planning   CATHY: 3/24/2025     Code Status: Full Code   Medical Readiness for Discharge Date:   Discharge Plan A: Home with family                        Carmelita John MD  Department of Hospital Medicine   [facility]

## 2025-03-23 NOTE — ASSESSMENT & PLAN NOTE
Nutrition consulted. Most recent weight and BMI monitored-     Measurements:  Wt Readings from Last 1 Encounters:   03/20/25 86 kg (189 lb 9.5 oz)   Body mass index is 29.69 kg/m².    Patient has been screened and assessed by RD.    Malnutrition Type:  Context: chronic illness  Level: severe    Malnutrition Characteristic Summary:  Weight Loss (Malnutrition): greater than 5% in 1 month  Energy Intake (Malnutrition): less than or equal to 75% for greater than or equal to 1 month    Interventions/Recommendations (treatment strategy):  1. Recommend [place] diet. Provided menu and contact information for meal choices. 2. Recommend Boost + Vanilla with meals. 3. Should patient agree: Two Schuyler HN, 240 mL, 3 times / day with Pro Source TID per PEG, after meals would provide 1605 kcal, 104 gm protein and 1494 free water. Meets 75% EEN and 100% EPN. 4. Suggest 50 mL FWF each Bolus.

## 2025-03-23 NOTE — ASSESSMENT & PLAN NOTE
Hyponatremia is likely due to Dehydration/hypovolemia. The patient's most recent sodium results are listed below.  Recent Labs     03/21/25  0512 03/22/25  0453 03/23/25  0454    135* 139     Plan  - Correct the sodium by 4-6mEq in 24 hours.   - Monitor sodium Daily.   - Patient hyponatremia is stable  - continue to monitor.

## 2025-03-23 NOTE — ASSESSMENT & PLAN NOTE
The likely etiology of thrombocytopenia is bone marrow suppression due to chemotherapy. The patients 3 most recent labs are listed below.  Recent Labs     03/21/25  0512 03/22/25  0453 03/23/25  0454    206 203     Plan  - Will transfuse if platelet count is <10k.

## 2025-03-23 NOTE — RESPIRATORY THERAPY
03/23/25 1419   Patient Assessment/Suction   Level of Consciousness (AVPU) alert   Respiratory Effort Normal;Unlabored   Expansion/Accessory Muscles/Retractions no use of accessory muscles;expansion symmetric;no retractions   Rhythm/Pattern, Respiratory unlabored;pattern regular;depth regular;no shortness of breath reported   PRE-TX-O2   Device (Oxygen Therapy) room air   SpO2 96 %   Pulse Oximetry Type Intermittent   $ Pulse Oximetry - Multiple Charge Pulse Oximetry - Multiple   Pulse 80   Resp 17   Incentive Spirometer   $ Incentive Spirometer Charges done with encouragement   Incentive Spirometer Predicted Level (mL) 1500   Administration (IS) instruction provided, follow-up

## 2025-03-23 NOTE — CARE UPDATE
03/22/25 2030   Patient Assessment/Suction   Level of Consciousness (AVPU) alert   Respiratory Effort Unlabored   PRE-TX-O2   Device (Oxygen Therapy) room air   SpO2 98 %   Pulse Oximetry Type Intermittent   $ Pulse Oximetry - Multiple Charge Pulse Oximetry - Multiple   Pulse 68   Resp 18   Positioning HOB elevated 30 degrees   Aerosol Therapy   $ Aerosol Therapy Charges PRN treatment not required   Incentive Spirometer   $ Incentive Spirometer Charges refused

## 2025-03-23 NOTE — PROGRESS NOTES
Pharmacokinetic Assessment Follow Up: IV Vancomycin    Vancomycin serum concentration assessment(s):    The random level was drawn correctly and can be used to guide therapy at this time. The measurement is within the desired definitive target range of 15 to 20 mcg/mL.    Vancomycin Regimen Plan:    Re-dose when the random level is less than 18 mcg/mL, next level to be drawn at 03/23/2025 on 18:00.    Drug levels (last 3 results):  Recent Labs   Lab Result Units 03/22/25 0453 03/23/25  0005 03/23/25  0840   Vancomycin Random ug/ml  --   --  18.4   Vancomycin Trough ug/ml 10.0 24.5*  --        Pharmacy will continue to follow and monitor vancomycin.    Please contact pharmacy at extension [phone] for questions regarding this assessment.    Thank you for the consult,   Mindi Charles       Patient brief summary:  Kwasi Brown is a 59 y.o. female initiated on antimicrobial therapy with IV Vancomycin for treatment of sepsis    The patient's current regimen is Intermittent dosing.    Drug Allergies:   Review of patient's allergies indicates:  No Known Allergies    Actual Body Weight:   86 kg    Renal Function:   Estimated Creatinine Clearance: 74 mL/min (based on SCr of 0.9 mg/dL).,     Dialysis Method (if applicable):  N/A    CBC (last 72 hours):  Recent Labs   Lab Result Units 03/21/25  0512 03/22/25 0453 03/23/25  0454   WBC K/uL 2.81* 3.92 5.59   Hemoglobin g/dL 8.8*  --   --    Hgb gm/dL  --  8.8* 8.6*   Hematocrit % 28.5*  --   --    Hct %  --  29.1* 28.5*   Platelet Count K/uL  --  206 203   Platelets K/uL 168  --   --    Gran % % 49.8  --   --    Lymph % % 19.6  --   --    Mono % % 25.3*  --   --    Monocyte % %  --  5.0 2.0*   Eosinophil % % 0.0  --   --    Basophil % % 0.7 1.0  --    Differential Method  Automated  --   --        Metabolic Panel (last 72 hours):  Recent Labs   Lab Result Units 03/21/25  0512 03/22/25  0453 03/23/25  0454 03/23/25  0840   Sodium mmol/L 136 135* 139  --    Potassium mmol/L 3.4*  4.0 3.9  --    Chloride mmol/L 98 97 100  --    CO2 mmol/L 32* 31* 30*  --    Glucose mg/dL 124* 104 107  --    BUN mg/dL 9 8 12  --    Creatinine mg/dL 0.4* 0.4* 0.9  --    Albumin g/dL 2.5* 2.5* 2.5*  --    Total Bilirubin mg/dL 0.4  --   --   --    Bilirubin Total mg/dL  --  0.3 0.3  --    ALP unit/L  --  105 114  --    Alkaline Phosphatase U/L 102  --   --   --    AST unit/L 20 20 35  --    ALT unit/L 26 23 28  --    Magnesium mg/dL 1.7 1.7 1.8  --    Phosphorus Level mg/dL  --   --   --  3.8       Vancomycin Administrations:  vancomycin given in the last 96 hours                     vancomycin 1,250 mg in 0.9% NaCl 250 mL IVPB (admixture device) (mg) 1,250 mg New Bag 03/23/25 0037     1,250 mg New Bag 03/22/25 1622      Restarted  0838     1,250 mg New Bag  0829    vancomycin 1,500 mg in 0.9% NaCl 250 mL IVPB (admixture device) (mg) 1,500 mg New Bag 03/21/25 1744    vancomycin (VANCOCIN) 1,750 mg in 0.9% NaCl 500 mL IVPB (mg) 1,750 mg New Bag 03/20/25 1749                    Microbiologic Results:  Microbiology Results (last 7 days)       Procedure Component Value Units Date/Time    Blood culture x two cultures. Draw prior to antibiotics. [[id number]] Collected: 03/19/25 1406    Order Status: Completed Specimen: Blood from Peripheral, Antecubital, Left Updated: 03/22/25 1632     Blood Culture, Routine No Growth to date      No Growth to date      No Growth to date      No Growth to date    Narrative:      Aerobic and anaerobic    Blood culture x two cultures. Draw prior to antibiotics. [[id number]] Collected: 03/19/25 1401    Order Status: Completed Specimen: Blood from Peripheral, Antecubital, Right Updated: 03/22/25 1632     Blood Culture, Routine No Growth to date      No Growth to date      No Growth to date      No Growth to date    Narrative:      Aerobic and anaerobic    Group A Strep, Molecular [[id number]] Collected: 03/19/25 1527    Order Status: Completed Specimen: Throat Updated: 03/19/25 1556      Group A Strep, Molecular Negative     Comment: Arcanobacterium haemolyticum and Beta Streptococcus group C   and G will not be detected by this test method.  Please order   Throat Culture ([id number]) if suspected.         Blood culture [[id number]]     Order Status: Canceled Specimen: Blood     Respiratory Infection Panel (PCR), Nasopharyngeal [[id number]]     Order Status: Canceled Specimen: Nasopharyngeal Swab

## 2025-03-23 NOTE — ASSESSMENT & PLAN NOTE
This patient is found to have pancytopenia, the likely etiology is Drug induced (including chemotherapy) related to esophageal carcinoma, will monitor CBC Daily. Will transfuse red blood cells if the hemoglobin is <7g/dL (or <8 in the setting of ACS). Will transfuse platelets if platelet count is less than 10,000. Hold DVT prophylaxis if platelets are <50k. The patient's hemoglobin, white blood cell count, and platelet count results have been reviewed and are listed below.  Recent Labs   Lab 03/23/25  0454   HGB 8.6*   WBC 5.59

## 2025-03-23 NOTE — ASSESSMENT & PLAN NOTE
Nutrition consulted. Most recent weight and BMI monitored-     Measurements:  Wt Readings from Last 1 Encounters:   03/20/25 86 kg (189 lb 9.5 oz)   Body mass index is 29.69 kg/m².    Patient has been screened and assessed by RD.    Malnutrition Type:  Context: chronic illness  Level: severe    Malnutrition Characteristic Summary:  Weight Loss (Malnutrition): greater than 5% in 1 month  Energy Intake (Malnutrition): less than or equal to 75% for greater than or equal to 1 month    Interventions/Recommendations (treatment strategy):  1. Recommend [place] diet. Provided menu and contact information for meal choices. 2. Recommend Boost + Vanilla with meals. 3. Should patient agree: Two Schuyler HN, 240 mL, 3 times / day with Pro Source TID per PEG, after meals would provide 1605 kcal, 104 gm protein and 1494 free water. Meets 75% EEN and 100% EPN. 4. Suggest 50 mL FWF each Bolus.

## 2025-03-24 ENCOUNTER — TELEPHONE (OUTPATIENT)
Dept: HEMATOLOGY/ONCOLOGY | Facility: CLINIC | Age: 60
End: 2025-03-24
Payer: COMMERCIAL

## 2025-03-24 VITALS
DIASTOLIC BLOOD PRESSURE: 73 MMHG | WEIGHT: 189.63 LBS | BODY MASS INDEX: 29.76 KG/M2 | HEART RATE: 88 BPM | SYSTOLIC BLOOD PRESSURE: 105 MMHG | OXYGEN SATURATION: 96 % | RESPIRATION RATE: 18 BRPM | HEIGHT: 67 IN | TEMPERATURE: 98 F

## 2025-03-24 LAB
ALBUMIN SERPL-MCNC: 2.5 G/DL (ref 3.5–5.2)
ALP SERPL-CCNC: 111 UNIT/L (ref 55–135)
ALT SERPL-CCNC: 30 UNIT/L (ref 10–44)
ANION GAP (SMH): 7 MMOL/L (ref 8–16)
AST SERPL-CCNC: 36 UNIT/L (ref 10–40)
BACTERIA BLD CULT: NORMAL
BACTERIA BLD CULT: NORMAL
BILIRUB SERPL-MCNC: 0.3 MG/DL (ref 0.1–1)
BUN SERPL-MCNC: 15 MG/DL (ref 8–23)
C-REACTIVE PROTEIN (SMH): 10.7 MG/DL
CALCIUM SERPL-MCNC: 8.6 MG/DL (ref 8.7–10.5)
CHLORIDE SERPL-SCNC: 102 MMOL/L (ref 95–110)
CO2 SERPL-SCNC: 30 MMOL/L (ref 23–29)
CREAT SERPL-MCNC: 1 MG/DL (ref 0.5–1.4)
ERYTHROCYTE [DISTWIDTH] IN BLOOD BY AUTOMATED COUNT: 17.4 % (ref 11.5–14.5)
GFR SERPLBLD CREATININE-BSD FMLA CKD-EPI: >60 ML/MIN/1.73/M2
GLUCOSE SERPL-MCNC: 138 MG/DL (ref 70–110)
HCT VFR BLD AUTO: 30 % (ref 37–48.5)
HGB BLD-MCNC: 9 GM/DL (ref 12–16)
LYMPHOCYTES NFR BLD MANUAL: 13 % (ref 18–48)
MAGNESIUM SERPL-MCNC: 2.1 MG/DL (ref 1.6–2.6)
MCH RBC QN AUTO: 25.3 PG (ref 27–31)
MCHC RBC AUTO-ENTMCNC: 30 G/DL (ref 32–36)
MCV RBC AUTO: 84 FL (ref 82–98)
MONOCYTES NFR BLD MANUAL: 4 % (ref 4–15)
MYELOCYTES NFR BLD MANUAL: 3 %
NEUTROPHILS NFR BLD MANUAL: 80 % (ref 38–73)
NUCLEATED RBC (/100WBC) (SMH): 0 /100 WBC
OHS QRS DURATION: 144 MS
OHS QTC CALCULATION: 497 MS
PLATELET # BLD AUTO: 210 K/UL (ref 150–450)
PLATELET BLD QL SMEAR: ABNORMAL
PMV BLD AUTO: 9 FL (ref 9.2–12.9)
POTASSIUM SERPL-SCNC: 3.7 MMOL/L (ref 3.5–5.1)
PROT SERPL-MCNC: 5.7 GM/DL (ref 6–8.4)
RBC # BLD AUTO: 3.56 M/UL (ref 4–5.4)
SODIUM SERPL-SCNC: 139 MMOL/L (ref 136–145)
VANCOMYCIN SERPL-MCNC: 17.3 UG/ML (ref ?–80)
WBC # BLD AUTO: 7.1 K/UL (ref 3.9–12.7)

## 2025-03-24 PROCEDURE — 93010 ELECTROCARDIOGRAM REPORT: CPT | Mod: ,,, | Performed by: INTERNAL MEDICINE

## 2025-03-24 PROCEDURE — 80202 ASSAY OF VANCOMYCIN: CPT

## 2025-03-24 PROCEDURE — 86140 C-REACTIVE PROTEIN: CPT

## 2025-03-24 PROCEDURE — 63600175 PHARM REV CODE 636 W HCPCS

## 2025-03-24 PROCEDURE — 94799 UNLISTED PULMONARY SVC/PX: CPT

## 2025-03-24 PROCEDURE — 25000003 PHARM REV CODE 250: Performed by: NURSE PRACTITIONER

## 2025-03-24 PROCEDURE — 85027 COMPLETE CBC AUTOMATED: CPT | Performed by: NURSE PRACTITIONER

## 2025-03-24 PROCEDURE — 36415 COLL VENOUS BLD VENIPUNCTURE: CPT

## 2025-03-24 PROCEDURE — 83735 ASSAY OF MAGNESIUM: CPT | Performed by: NURSE PRACTITIONER

## 2025-03-24 PROCEDURE — 80053 COMPREHEN METABOLIC PANEL: CPT | Performed by: NURSE PRACTITIONER

## 2025-03-24 PROCEDURE — 93005 ELECTROCARDIOGRAM TRACING: CPT | Performed by: INTERNAL MEDICINE

## 2025-03-24 PROCEDURE — 63600175 PHARM REV CODE 636 W HCPCS: Performed by: HOSPITALIST

## 2025-03-24 PROCEDURE — 25000003 PHARM REV CODE 250

## 2025-03-24 PROCEDURE — 94761 N-INVAS EAR/PLS OXIMETRY MLT: CPT

## 2025-03-24 PROCEDURE — 36415 COLL VENOUS BLD VENIPUNCTURE: CPT | Performed by: NURSE PRACTITIONER

## 2025-03-24 PROCEDURE — 99900035 HC TECH TIME PER 15 MIN (STAT)

## 2025-03-24 RX ORDER — CIPROFLOXACIN 500 MG/1
750 TABLET ORAL EVERY 12 HOURS
Qty: 5 TABLET | Refills: 0 | Status: SHIPPED | OUTPATIENT
Start: 2025-03-24 | End: 2025-03-26

## 2025-03-24 RX ORDER — AMOXICILLIN AND CLAVULANATE POTASSIUM 875; 125 MG/1; MG/1
1 TABLET, FILM COATED ORAL EVERY 12 HOURS
Qty: 3 TABLET | Refills: 0 | Status: SHIPPED | OUTPATIENT
Start: 2025-03-24 | End: 2025-03-26

## 2025-03-24 RX ADMIN — PANTOPRAZOLE SODIUM 40 MG: 40 TABLET, DELAYED RELEASE ORAL at 06:03

## 2025-03-24 RX ADMIN — CEFEPIME 2 G: 2 INJECTION, POWDER, FOR SOLUTION INTRAVENOUS at 02:03

## 2025-03-24 RX ADMIN — CEFEPIME 2 G: 2 INJECTION, POWDER, FOR SOLUTION INTRAVENOUS at 06:03

## 2025-03-24 RX ADMIN — CETIRIZINE HYDROCHLORIDE 10 MG: 10 TABLET, FILM COATED ORAL at 08:03

## 2025-03-24 RX ADMIN — ONDANSETRON 8 MG: 2 INJECTION INTRAMUSCULAR; INTRAVENOUS at 01:03

## 2025-03-24 RX ADMIN — MUPIROCIN 1 G: 20 OINTMENT TOPICAL at 08:03

## 2025-03-24 RX ADMIN — HYDROCODONE BITARTRATE AND ACETAMINOPHEN 1 TABLET: 10; 325 TABLET ORAL at 01:03

## 2025-03-24 RX ADMIN — ALUMINUM HYDROXIDE, MAGNESIUM HYDROXIDE, AND SIMETHICONE 30 ML: 200; 200; 20 SUSPENSION ORAL at 01:03

## 2025-03-24 RX ADMIN — THERA TABS 1 TABLET: TAB at 08:03

## 2025-03-24 NOTE — PLAN OF CARE
Pt cleared from cm-  family to transport home at dc  Appts added to AVS, referrals added to AVS     03/24/25 1420   Final Note   Assessment Type Final Discharge Note   Anticipated Discharge Disposition Home   Hospital Resources/Appts/Education Provided Appointments scheduled and added to AVS   Post-Acute Status   Discharge Delays None known at this time

## 2025-03-24 NOTE — PROGRESS NOTES
Pharmacy Consult Discontinuation: Vancomycin    Kwasi Brown [id number] is a 59 y.o. female was consulted for vancomycin pharmacotherapy management by pharmacy.    Pharmacy consult for vancomycin dosing is no longer required.  Vancomycin was discontinued 03/24/2025.    Thank you for allowing us to participate in this patient's care. Should you have any questions or concerns please feel free to contact the pharmacy department at [phone].    Timi Hurley, NaaD

## 2025-03-24 NOTE — DISCHARGE INSTRUCTIONS
Discharge Instructions, [facility]    Thank you for choosing [facility] for your medical care. The primary doctor who is taking care of you at the time of your discharge is Carmelita John, *.     You were admitted to the hospital with Febrile neutropenia.     Please note your discharge instructions, including diet/activity restrictions, follow-up appointments, and medication changes.  If you have any questions about your medical issues, prescriptions, or any other questions, please feel free to contact the [facility] Medicine Dept at [phone] and we will help.    If you are previously with Home health, outpatient PT/OT or under a therapy program, you are cleared to return to those programs.    Please direct all long term medication refills and follow up to your primary care provider, No, Primary Doctor. Thank you again for letting us take care of your health care needs.    Please note the following discharge instructions per your discharging physician-    Please take antibiotics as prescribed    Please follow up with your doctor as outpatient    For any other worsening signs or symptoms, please call your doctor or go to the emergency department    For any other emergency, call [phone]

## 2025-03-24 NOTE — CARE UPDATE
03/24/25 0749   Patient Assessment/Suction   Level of Consciousness (AVPU) alert   All Lung Fields Breath Sounds diminished   PRE-TX-O2   Device (Oxygen Therapy) room air   SpO2 96 %   Pulse Oximetry Type Intermittent   $ Pulse Oximetry - Multiple Charge Pulse Oximetry - Multiple   Pulse 83   Resp 18   Aerosol Therapy   $ Aerosol Therapy Charges PRN treatment not required        [place]or[place] Pregnancy And Lactation Text: This medication is not safe during pregnancy. It is unknown if this medication is excreted in breast milk.

## 2025-03-24 NOTE — PROGRESS NOTES
Pharmacokinetic Assessment Follow Up: IV Vancomycin    Patient brief summary:  Kwasi Brown is a 59 y.o. female initiated on antimicrobial therapy with IV Vancomycin for treatment of Sepsis    The patient's current regimen is Vancomycin 1250 mg IV every 8 hours. Regimen held due to high trough level.    Actual Body Weight = 86 kg (189 lb 9.5 oz).    Renal Function:   Estimated Creatinine Clearance: 74 mL/min (based on SCr of 0.9 mg/dL).      Vancomycin serum concentration assessment(s):    The random level was drawn correctly and can be used to guide therapy at this time. The measurement is below the desired definitive target range of 15 to 20 mcg/mL.    Vancomycin Regimen Plan:    Change regimen to pulse dosing. Re-dose Vancomycin 1250  mg IV once.Next random concentration measured at 0800 on 03/24.    Drug levels (last 3 results):  Recent Labs   Lab Result Units 03/22/25  0453 03/23/25  0005 03/23/25  0840 03/23/25  1628   Vancomycin Random ug/ml  --   --  18.4 12.9   Vancomycin Trough ug/ml 10.0 24.5*  --   --        Pharmacy will continue to follow and monitor vancomycin.    Please contact pharmacy at extension [phone] for questions regarding this assessment.    Thank you for the consult,   Ashwini Wong

## 2025-03-25 NOTE — DISCHARGE SUMMARY
[facility] Medicine  Discharge Summary      Patient Name: Kwasi Brown  MRN: [id number]  CHRISTIANO: [id number]  Patient Class: IP- Inpatient  Admission Date: 3/19/2025  Hospital Length of Stay: 5 days  Discharge Date and Time: 3/24/2025  3:53 PM  Attending Physician: Sierra att. providers found   Discharging Provider: Carmelita John MD  Primary Care Provider: Sierra, Primary Doctor    Primary Care Team: Networked reference to record PCT     HPI:   59-year-old female with pMHx of esophageal cancer who presented to the ED with complaints elevated temperature of 101.7°.  Patient's spoke with her oncologists and P who had recommended that she come to the ED for evaluation.  Patient reports her last chemotherapy was 9 days prior.  Patient reports that she has severe pain with swallowing she finds that sometimes Maalox helps with the pain in her esophagus after eating.  Discussed starting patient on GI cocktail to help with pain of her oral potassium supplements.  Patient will be placed on airborne and contact an enhanced respiratory precautions due to her neutropenia.  Patient is borderline with an ANC of 1000.    Triage vitals with pulse 96, /69, temp 98.9°, SpO2 96% on room air.  Blood work with sodium 131, potassium 3.2, chloride 94, glucose 144, calcium 8.2, total protein 5.9, albumin 2.8, WBC 2.2, RBC 3.4, hemoglobin 9.1, hematocrit 28.6, urine negative for infection.  Blood cultures drawn, group a strep negative, chest x-ray with no acute cardiopulmonary process.    * No surgery found *      Hospital Course:   Patient was admitted for febrile neutropenia.  She was started on broad-spectrum IV antibiotic therapy, and improved over the course of her hospitalization.  Patient had some minor electrolyte abnormalities which were assessed.  Oncology was consulted to assist in plan of care. Eventually white count improved and neutropenia resolved.  ESR CRP were elevated and IV antibiotics was  continued for 48 hours after neutrophil count improved.  Eventually inflammatory markers downtrended, patient felt much better.  Pancultures resulted negative and white count stable.  She will complete oral antibiotic course for a total of 7 days.  All instructions provided at bedside, she will follow up with discharge Clinic and Oncology as outpatient.  She verbalized understanding.  Case management assisted in appointments. Strict return precautions advised.     Goals of Care Treatment Preferences:  Code Status: Full Code      SDOH Screening:  The patient declined to be screened for utility difficulties, food insecurity, transport difficulties, housing insecurity, and interpersonal safety, so no concerns could be identified this admission.     Consults:   Consults (From admission, onward)          Status Ordering Provider     Inpatient consult to Registered Dietitian/Nutritionist  Once        Provider:  (Not yet assigned)    Completed JUAN J GOMEZ     Inpatient consult to Hematology/Oncology  Once        Provider:  Khoobehi, Aurash, MD    Completed JUAN J GOMEZ            Assessment & Plan    Final Active Diagnoses:    Diagnosis Date Noted POA    PRINCIPAL PROBLEM:  Febrile neutropenia [D70.9, R50.81] 03/19/2025 Yes    Thrombocytopenia [D69.6] 03/21/2025 Yes    Severe malnutrition [E43] 03/21/2025 Yes    Pancytopenia [D61.818] 03/20/2025 Yes    Malnutrition of mild degree [E44.1] 03/20/2025 Yes    Anemia [D64.9] 03/19/2025 Yes    Hyponatremia [E87.1] 03/19/2025 Yes    Hypokalemia [E87.6] 03/07/2025 Yes    Malignant neoplasm of abdominal esophagus [C15.5] 01/15/2025 Yes    Dyslipidemia [E78.5] 05/05/2021 Yes      Problems Resolved During this Admission:       Discharged Condition: stable    Disposition: Home or Self Care    Follow Up:   Follow-up Information       Cristina Mcgill MD. Schedule an appointment as soon as possible for a visit on 3/31/2025.    Specialty: Internal Medicine  Why: @0915 for [facility]  follow up  Contact information:  [address] [place] [address]  [phone]               Khoobehi, Aurash, MD. Schedule an appointment as soon as possible for a visit.    Specialties: Hematology and Oncology, Hematology, Oncology  Why: Staff messaged for appt, clinic to reach out for scheduling  Contact information:  [address] [facility]  [address] [place] [id number]  [phone]                           Patient Instructions:      Notify your health care provider if you experience any of the following:  temperature >100.4     Notify your health care provider if you experience any of the following:  persistent nausea and vomiting or diarrhea     Notify your health care provider if you experience any of the following:  severe uncontrolled pain     Notify your health care provider if you experience any of the following:  redness, tenderness, or signs of infection (pain, swelling, redness, odor or green/yellow discharge around incision site)     Notify your health care provider if you experience any of the following:  difficulty breathing or increased cough     Notify your health care provider if you experience any of the following:  severe persistent headache     Notify your health care provider if you experience any of the following:  worsening rash     Notify your health care provider if you experience any of the following:  persistent dizziness, light-headedness, or visual disturbances     Notify your health care provider if you experience any of the following:  increased confusion or weakness     Notify your health care provider if you experience any of the following:     Activity as tolerated       Significant Diagnostic Studies: N/A    Pending Diagnostic Studies:       None           Medications:  Reconciled Home Medications:      Medication List        PAUSE taking these medications      benazepril-hydrochlorthiazide 20-12.5 mg per tablet  Wait to take this until your doctor or other care provider tells  you to start again.  Commonly known as: LOTENSIN HCT  Take 1 tablet by mouth once daily.            START taking these medications      amoxicillin-clavulanate 875-125mg 875-125 mg per tablet  Commonly known as: AUGMENTIN  Take 1 tablet by mouth every 12 (twelve) hours. for 2 days     ciprofloxacin HCl 500 MG tablet  Commonly known as: CIPRO  Take 1.5 tablets (750 mg total) by mouth every 12 (twelve) hours. for 2 days            CONTINUE taking these medications      aluminum & magnesium hydroxide-simethicone 400-400-40 mg/5 mL suspension  Commonly known as: MYLANTA MAX STRENGTH  Take 10 mLs by mouth every 6 (six) hours as needed for Indigestion.     azelastine 137 mcg (0.1 %) nasal spray  Commonly known as: ASTELIN  2 sprays by Nasal route 2 (two) times daily.     cetirizine 10 MG tablet  Commonly known as: ZYRTEC  Take 10 mg by mouth once daily.     dexAMETHasone 4 MG Tab  Commonly known as: DECADRON  Take take 2 tablets (8 mg) by mouth twice daily the day before chemotherapy and the day following chemotherapy, then take 2 tablets (8 mg) by mouth on day 3 of each chemotherapy cycle.     famotidine 40 MG tablet  Commonly known as: PEPCID  Take 2 tablets (80 mg total) by mouth every evening.     fluticasone propionate 50 mcg/actuation nasal spray  Commonly known as: FLONASE  1 spray by Each Nostril route once daily.     HYDROcodone-acetaminophen  mg per tablet  Commonly known as: NORCO  Take 1 tablet by mouth every 6 (six) hours as needed for Pain.     LIDOcaine-prilocaine cream  Commonly known as: EMLA  Apply topically as needed (Apply to port site 30 minutes before access as needed).     LORazepam 0.5 MG tablet  Commonly known as: ATIVAN  Take 1 tablet (0.5 mg total) by mouth every 6 (six) hours as needed for Anxiety (Nausea and vomiting).     lysine 1,000 mg Tab  Take 1 tablet by mouth Daily.     multivitamin capsule  Take 1 capsule by mouth once daily.     OLANZapine 5 MG tablet  Commonly known as:  ZyPREXA  Take 1 tablet the night of chemotherapy and for the following 3 nights.     ondansetron 8 MG tablet  Commonly known as: ZOFRAN  Take 1 tablet (8 mg total) by mouth every 12 (twelve) hours as needed for Nausea.     pantoprazole 20 MG tablet  Commonly known as: PROTONIX  Take 1 tablet (20 mg total) by mouth once daily.     PROAIR DIGIHALER 90 mcg/actuation Aebs  Generic drug: albuterol sulfate  Inhale 2 puffs every 4 hours by inhalation route.     promethazine 25 MG tablet  Commonly known as: PHENERGAN  Take 1 tablet (25 mg total) by mouth every 6 (six) hours as needed for Nausea.     scopolamine 1.3-1.5 mg (1 mg over 3 days)  Commonly known as: TRANSDERM-SCOP  Place 1 patch onto the skin every 72 hours.            STOP taking these medications      ibuprofen 200 MG tablet  Commonly known as: ADVIL,MOTRIN     ondansetron 8 MG Tbdl  Commonly known as: ZOFRAN-ODT              Indwelling Lines/Drains at time of discharge:   Lines/Drains/Airways       Central Venous Catheter Line  Duration             Port A Cath Single Lumen 01/30/25 0818 Internal Jugular Left 53 days              Drain  Duration                  Gastrostomy/Enterostomy 01/30/25 0814 Gastrostomy tube w/ balloon LUQ feeding 53 days                    Time spent on the discharge of patient: 45 minutes         Carmelita John MD  Department of Hospital Medicine  formerly [facility]

## 2025-03-28 ENCOUNTER — LAB VISIT (OUTPATIENT)
Dept: LAB | Facility: HOSPITAL | Age: 60
End: 2025-03-28
Attending: INTERNAL MEDICINE
Payer: COMMERCIAL

## 2025-03-28 DIAGNOSIS — C15.5 MALIGNANT NEOPLASM OF ABDOMINAL ESOPHAGUS: ICD-10-CM

## 2025-03-28 LAB
ABSOLUTE EOSINOPHIL (SMH): 0.02 K/UL
ABSOLUTE MONOCYTE (SMH): 0.7 K/UL (ref 0.3–1)
ABSOLUTE NEUTROPHIL COUNT (SMH): 7.6 K/UL (ref 1.8–7.7)
ALBUMIN SERPL-MCNC: 3.1 G/DL (ref 3.5–5.2)
ALP SERPL-CCNC: 112 UNIT/L (ref 55–135)
ALT SERPL-CCNC: 24 UNIT/L (ref 10–44)
ANION GAP (SMH): 7 MMOL/L (ref 8–16)
AST SERPL-CCNC: 27 UNIT/L (ref 10–40)
BASOPHILS # BLD AUTO: 0.05 K/UL
BASOPHILS NFR BLD AUTO: 0.5 %
BILIRUB SERPL-MCNC: 0.3 MG/DL (ref 0.1–1)
BUN SERPL-MCNC: 12 MG/DL (ref 8–23)
CALCIUM SERPL-MCNC: 8.9 MG/DL (ref 8.7–10.5)
CHLORIDE SERPL-SCNC: 100 MMOL/L (ref 95–110)
CO2 SERPL-SCNC: 31 MMOL/L (ref 23–29)
CREAT SERPL-MCNC: 0.8 MG/DL (ref 0.5–1.4)
ERYTHROCYTE [DISTWIDTH] IN BLOOD BY AUTOMATED COUNT: 18.1 % (ref 11.5–14.5)
GFR SERPLBLD CREATININE-BSD FMLA CKD-EPI: >60 ML/MIN/1.73/M2
GLUCOSE SERPL-MCNC: 166 MG/DL (ref 70–110)
HCT VFR BLD AUTO: 33 % (ref 37–48.5)
HGB BLD-MCNC: 9.8 GM/DL (ref 12–16)
IMM GRANULOCYTES # BLD AUTO: 0.27 K/UL (ref 0–0.04)
IMM GRANULOCYTES NFR BLD AUTO: 2.8 % (ref 0–0.5)
LYMPHOCYTES # BLD AUTO: 1.01 K/UL (ref 1–4.8)
MCH RBC QN AUTO: 25.5 PG (ref 27–31)
MCHC RBC AUTO-ENTMCNC: 29.7 G/DL (ref 32–36)
MCV RBC AUTO: 86 FL (ref 82–98)
NUCLEATED RBC (/100WBC) (SMH): 0 /100 WBC
PLATELET # BLD AUTO: 287 K/UL (ref 150–450)
PLATELET BLD QL SMEAR: NORMAL
PMV BLD AUTO: 9 FL (ref 9.2–12.9)
POTASSIUM SERPL-SCNC: 3.6 MMOL/L (ref 3.5–5.1)
PROT SERPL-MCNC: 6.6 GM/DL (ref 6–8.4)
RBC # BLD AUTO: 3.84 M/UL (ref 4–5.4)
RELATIVE EOSINOPHIL (SMH): 0.2 % (ref 0–8)
RELATIVE LYMPHOCYTE (SMH): 10.5 % (ref 18–48)
RELATIVE MONOCYTE (SMH): 7.3 % (ref 4–15)
RELATIVE NEUTROPHIL (SMH): 78.7 % (ref 38–73)
SODIUM SERPL-SCNC: 138 MMOL/L (ref 136–145)
WBC # BLD AUTO: 9.64 K/UL (ref 3.9–12.7)

## 2025-03-28 PROCEDURE — 36415 COLL VENOUS BLD VENIPUNCTURE: CPT

## 2025-03-28 PROCEDURE — 84075 ASSAY ALKALINE PHOSPHATASE: CPT

## 2025-03-28 PROCEDURE — 85025 COMPLETE CBC W/AUTO DIFF WBC: CPT

## 2025-03-29 DIAGNOSIS — K21.9 GASTROESOPHAGEAL REFLUX DISEASE WITHOUT ESOPHAGITIS: ICD-10-CM

## 2025-03-30 LAB
OHS QRS DURATION: 142 MS
OHS QTC CALCULATION: 516 MS

## 2025-03-31 ENCOUNTER — OFFICE VISIT (OUTPATIENT)
Dept: HEMATOLOGY/ONCOLOGY | Facility: CLINIC | Age: 60
End: 2025-03-31
Payer: COMMERCIAL

## 2025-03-31 VITALS
HEART RATE: 85 BPM | OXYGEN SATURATION: 97 % | TEMPERATURE: 97 F | WEIGHT: 184.06 LBS | SYSTOLIC BLOOD PRESSURE: 131 MMHG | HEIGHT: 67 IN | BODY MASS INDEX: 28.89 KG/M2 | DIASTOLIC BLOOD PRESSURE: 84 MMHG

## 2025-03-31 DIAGNOSIS — G89.3 NEOPLASM RELATED PAIN: ICD-10-CM

## 2025-03-31 DIAGNOSIS — D70.1 CHEMOTHERAPY INDUCED NEUTROPENIA: ICD-10-CM

## 2025-03-31 DIAGNOSIS — K21.9 GASTROESOPHAGEAL REFLUX DISEASE WITHOUT ESOPHAGITIS: ICD-10-CM

## 2025-03-31 DIAGNOSIS — T45.1X5A CHEMOTHERAPY INDUCED NEUTROPENIA: ICD-10-CM

## 2025-03-31 DIAGNOSIS — C15.5 MALIGNANT NEOPLASM OF ABDOMINAL ESOPHAGUS: Primary | ICD-10-CM

## 2025-03-31 PROCEDURE — 3008F BODY MASS INDEX DOCD: CPT | Mod: CPTII,S$GLB,, | Performed by: INTERNAL MEDICINE

## 2025-03-31 PROCEDURE — 3079F DIAST BP 80-89 MM HG: CPT | Mod: CPTII,S$GLB,, | Performed by: INTERNAL MEDICINE

## 2025-03-31 PROCEDURE — 99215 OFFICE O/P EST HI 40 MIN: CPT | Mod: S$GLB,,, | Performed by: INTERNAL MEDICINE

## 2025-03-31 PROCEDURE — 99999 PR PBB SHADOW E&M-EST. PATIENT-LVL IV: CPT | Mod: PBBFAC,,, | Performed by: INTERNAL MEDICINE

## 2025-03-31 PROCEDURE — 1160F RVW MEDS BY RX/DR IN RCRD: CPT | Mod: CPTII,S$GLB,, | Performed by: INTERNAL MEDICINE

## 2025-03-31 PROCEDURE — 3075F SYST BP GE 130 - 139MM HG: CPT | Mod: CPTII,S$GLB,, | Performed by: INTERNAL MEDICINE

## 2025-03-31 PROCEDURE — 1159F MED LIST DOCD IN RCRD: CPT | Mod: CPTII,S$GLB,, | Performed by: INTERNAL MEDICINE

## 2025-03-31 PROCEDURE — 4010F ACE/ARB THERAPY RXD/TAKEN: CPT | Mod: CPTII,S$GLB,, | Performed by: INTERNAL MEDICINE

## 2025-03-31 PROCEDURE — G2211 COMPLEX E/M VISIT ADD ON: HCPCS | Mod: S$GLB,,, | Performed by: INTERNAL MEDICINE

## 2025-03-31 PROCEDURE — 1111F DSCHRG MED/CURRENT MED MERGE: CPT | Mod: CPTII,S$GLB,, | Performed by: INTERNAL MEDICINE

## 2025-03-31 RX ORDER — PANTOPRAZOLE SODIUM 20 MG/1
20 TABLET, DELAYED RELEASE ORAL
Qty: 90 TABLET | Refills: 1 | Status: SHIPPED | OUTPATIENT
Start: 2025-03-31

## 2025-03-31 RX ORDER — DIPHENHYDRAMINE HYDROCHLORIDE 50 MG/ML
50 INJECTION, SOLUTION INTRAMUSCULAR; INTRAVENOUS ONCE AS NEEDED
Status: CANCELLED | OUTPATIENT
Start: 2025-04-01

## 2025-03-31 RX ORDER — SODIUM CHLORIDE 0.9 % (FLUSH) 0.9 %
10 SYRINGE (ML) INJECTION
Status: CANCELLED | OUTPATIENT
Start: 2025-04-02

## 2025-03-31 RX ORDER — SODIUM CHLORIDE 0.9 % (FLUSH) 0.9 %
10 SYRINGE (ML) INJECTION
Status: CANCELLED | OUTPATIENT
Start: 2025-04-01

## 2025-03-31 RX ORDER — FAMOTIDINE 40 MG/1
80 TABLET, FILM COATED ORAL NIGHTLY
Qty: 180 TABLET | Refills: 1 | Status: SHIPPED | OUTPATIENT
Start: 2025-03-31 | End: 2026-03-31

## 2025-03-31 RX ORDER — EPINEPHRINE 0.3 MG/.3ML
0.3 INJECTION SUBCUTANEOUS ONCE AS NEEDED
Status: CANCELLED | OUTPATIENT
Start: 2025-04-01

## 2025-03-31 RX ORDER — HEPARIN 100 UNIT/ML
500 SYRINGE INTRAVENOUS
Status: CANCELLED | OUTPATIENT
Start: 2025-04-02

## 2025-03-31 RX ORDER — HEPARIN 100 UNIT/ML
500 SYRINGE INTRAVENOUS
Status: CANCELLED | OUTPATIENT
Start: 2025-04-01

## 2025-03-31 NOTE — PROGRESS NOTES
Service Date:  3/31/25    Chief Complaint:  Esophageal carcinoma     Kwasi Brown is a 59 y.o. female here with its esophageal carcinoma.      Here for chemotherapy clearance.  Recently hospitalized with neutropenic fever.  Had negative cultures.  Completed antibiotics.  Doing better now.  She is slowly recovering from the hospitalization.  Getting more active now.    Review of Systems   Constitutional:  Positive for appetite change. Negative for unexpected weight change.   HENT: Negative.  Negative for mouth sores.    Eyes: Negative.  Negative for visual disturbance.   Respiratory:  Positive for cough. Negative for shortness of breath.    Cardiovascular: Negative.  Negative for chest pain.   Gastrointestinal:  Positive for diarrhea. Negative for abdominal pain.   Endocrine: Negative.    Genitourinary: Negative.  Negative for frequency.   Musculoskeletal:  Positive for back pain.   Integumentary:  Negative for rash. Negative.   Neurological: Negative.  Negative for headaches.   Hematological: Negative.  Negative for adenopathy.   Psychiatric/Behavioral: Negative.  The patient is not nervous/anxious.         Current Outpatient Medications   Medication Instructions    albuterol sulfate (PROAIR DIGIHALER) 90 mcg/actuation aebs Inhale 2 puffs every 4 hours by inhalation route.    aluminum & magnesium hydroxide-simethicone (MYLANTA MAX STRENGTH) 400-400-40 mg/5 mL suspension 10 mLs, Every 6 hours PRN    azelastine (ASTELIN) 137 mcg (0.1 %) nasal spray 2 sprays, 2 times daily    [Paused] benazepril-hydrochlorthiazide (LOTENSIN HCT) 20-12.5 mg per tablet 1 tablet, Oral, Daily    cetirizine (ZYRTEC) 10 mg, Daily    dexAMETHasone (DECADRON) 4 MG Tab Take take 2 tablets (8 mg) by mouth twice daily the day before chemotherapy and the day following chemotherapy, then take 2 tablets (8 mg) by mouth on day 3 of each chemotherapy cycle.    famotidine (PEPCID) 80 mg, Oral, Nightly    fluticasone propionate (FLONASE) 50  mcg/actuation nasal spray 1 spray, Daily    HYDROcodone-acetaminophen (NORCO)  mg per tablet 1 tablet, Oral, Every 6 hours PRN    LIDOcaine-prilocaine (EMLA) cream Topical (Top), As needed (PRN)    LORazepam (ATIVAN) 0.5 mg, Oral, Every 6 hours PRN    lysine 1,000 mg Tab 1 tablet, Daily    multivitamin capsule 1 capsule, Daily    OLANZapine (ZYPREXA) 5 MG tablet Take 1 tablet the night of chemotherapy and for the following 3 nights.    ondansetron (ZOFRAN) 8 mg, Oral, Every 12 hours PRN    pantoprazole (PROTONIX) 20 mg, Oral    promethazine (PHENERGAN) 25 mg, Oral, Every 6 hours PRN    scopolamine (TRANSDERM-SCOP) 1.3-1.5 mg (1 mg over 3 days) 1 patch, Transdermal, Every 72 hours        Past Medical History:   Diagnosis Date    Esophageal cancer 01/04/2025    Hypertension 2009    Obesity         Past Surgical History:   Procedure Laterality Date    ADENOIDECTOMY      COLONOSCOPY W/ POLYPECTOMY  1989    Age 24 - polyps negative    HYSTERECTOMY  2009    Partial Hysterectomy    INSERTION OF TUNNELED CENTRAL VENOUS CATHETER (CVC) WITH SUBCUTANEOUS PORT Left 1/30/2025    Procedure: [id number];  Surgeon: Patrick Whelan Jr., MD;  Location: [facility] OR;  Service: General;  Laterality: Left;    INSERTION, GASTROSTOMY TUBE, LAPAROSCOPIC N/A 1/30/2025    Procedure: INSERTION, GASTROSTOMY TUBE, LAPAROSCOPIC;  Surgeon: Patrick Whelan Jr., MD;  Location: [facility] OR;  Service: General;  Laterality: N/A;    KNEE RECONSTRUCTION, MEDIAL PATELLAR FEMORAL LIGAMENT Bilateral     left ALSO RIGHT KNEE SCOPE    TONSILLECTOMY          Family History   Problem Relation Name Age of Onset    Tuberculosis Mother      COPD Mother      Heart disease Mother  70    Hypertension Mother      Hypertension Father      Heart disease Father  78    Diabetes Father      Diabetes Brother  1        type 1    Heart disease Brother      Hypertension Brother      Coronary artery disease Unknown      Hypertension Unknown      Diabetes  "Unknown         Social History     Tobacco Use    Smoking status: Former     Current packs/day: 0.00     Average packs/day: 0.3 packs/day for 12.0 years (3.0 ttl pk-yrs)     Types: Cigarettes     Start date: 1971     Quit date: 1983     Years since quittin.5    Smokeless tobacco: Never   Substance Use Topics    Alcohol use: Yes     Comment: occasional    Drug use: No         Vitals:    25 1126   BP: 131/84   Pulse: 85   Temp: 97.2 °F (36.2 °C)        Physical Exam:  /84 (BP Location: Right forearm, Patient Position: Sitting)   Pulse 85   Temp 97.2 °F (36.2 °C) (Temporal)   Ht 5' 7" (1.702 m) Comment: per the pt  Wt 83.5 kg (184 lb 1.4 oz)   SpO2 97%   BMI 28.83 kg/m²     Physical Exam  Constitutional:       Appearance: Normal appearance.   HENT:      Head: Normocephalic and atraumatic.      Nose: Nose normal.      Mouth/Throat:      Mouth: Mucous membranes are moist.      Pharynx: Oropharynx is clear.   Eyes:      Conjunctiva/sclera: Conjunctivae normal.   Cardiovascular:      Rate and Rhythm: Normal rate and regular rhythm.      Heart sounds: Normal heart sounds.   Pulmonary:      Effort: Pulmonary effort is normal.      Breath sounds: Normal breath sounds.   Abdominal:      General: Abdomen is flat. Bowel sounds are normal.      Palpations: Abdomen is soft.   Musculoskeletal:         General: Normal range of motion.      Cervical back: Normal range of motion and neck supple.   Skin:     General: Skin is warm and dry.   Neurological:      General: No focal deficit present.      Mental Status: She is alert and oriented to person, place, and time. Mental status is at baseline.   Psychiatric:         Mood and Affect: Mood normal.          Labs:  Lab Results   Component Value Date    WBC 9.64 2025    RBC 3.84 (L) 2025    HGB 9.8 (L) 2025    HCT 33.0 (L) 2025    MCV 86 2025    MCH 25.5 (L) 2025    MCHC 29.7 (L) 2025    RDW 18.1 (H) 2025    "  03/28/2025    MPV 9.0 (L) 03/28/2025    GRAN 1.4 (L) 03/21/2025    GRAN 49.8 03/21/2025    LYMPH 0.6 (L) 03/21/2025    LYMPH 19.6 03/21/2025    MONO 0.7 03/21/2025    MONO 25.3 (H) 03/21/2025    EOS 0.0 03/21/2025    BASO 0.02 03/21/2025    EOSINOPHIL 0.0 03/21/2025    BASOPHIL 0.7 03/21/2025     Sodium   Date Value Ref Range Status   03/28/2025 138 136 - 145 mmol/L Final     Potassium   Date Value Ref Range Status   03/28/2025 3.6 3.5 - 5.1 mmol/L Final     Chloride   Date Value Ref Range Status   03/21/2025 98 95 - 110 mmol/L Final     CO2   Date Value Ref Range Status   03/28/2025 31 (H) 23 - 29 mmol/L Final     Glucose   Date Value Ref Range Status   03/21/2025 124 (H) 70 - 110 mg/dL Final     BUN   Date Value Ref Range Status   03/28/2025 12 8 - 23 mg/dL Final     Creatinine   Date Value Ref Range Status   03/28/2025 0.8 0.5 - 1.4 mg/dL Final     Calcium   Date Value Ref Range Status   03/28/2025 8.9 8.7 - 10.5 mg/dL Final     Total Protein   Date Value Ref Range Status   03/21/2025 5.2 (L) 6.0 - 8.4 g/dL Final     Albumin   Date Value Ref Range Status   03/28/2025 3.1 (L) 3.5 - 5.2 g/dL Final     Bilirubin Total   Date Value Ref Range Status   03/28/2025 0.3 0.1 - 1.0 mg/dL Final     Comment:     For infants and newborns, interpretation of results should be based   on gestational age, weight and in agreement with clinical   observations.    Premature Infant recommended reference ranges:   0-24 hours:  <8.0 mg/dL   24-48 hours: <12.0 mg/dL   3-5 days:    <15.0 mg/dL   6-29 days:   <15.0 mg/dL     ALP   Date Value Ref Range Status   03/28/2025 112 55 - 135 unit/L Final     AST   Date Value Ref Range Status   03/28/2025 27 10 - 40 unit/L Final     ALT   Date Value Ref Range Status   03/28/2025 24 10 - 44 unit/L Final     Anion Gap   Date Value Ref Range Status   03/21/2025 6 (L) 8 - 16 mmol/L Final     eGFR if    Date Value Ref Range Status   02/23/2022 >60.0 >60 mL/min/1.73 m^2 Final      eGFR if non    Date Value Ref Range Status   02/23/2022 >60.0 >60 mL/min/1.73 m^2 Final     Comment:     Calculation used to obtain the estimated glomerular filtration  rate (eGFR) is the CKD-EPI equation.          A/P:    Poorly differentiated carcinoma of the esophagus  -PET scan unfortunately reveals a supraclavicular lymph node that is positive with a SUV of 9  -recommend palliative radiation to start with transition to chemotherapy after   -completed palliative radiation   -okay to proceed with chemotherapy with FLOT.  Patient does have a CPS score of 1.  However on [facility] testing, it did not recommend nivolumab.  I will be in contact with them to try to learn why and to added on later.  For now will continue with FLOT.  -RTC in 2 weeks for cycle 4.  Okay for cycle 3.    Chemotherapy-induced neutropenia   -will add Neulasta and a treatment plan, added to day 2      Aurash Khoobehi, MD  Hematology and Oncology    Visit today included increased complexity associated with the care of the episodic problem esophageal cancer addressed and managing the longitudinal care of the patient due to the serious and/or complex managed problem(s).

## 2025-03-31 NOTE — Clinical Note
Ok for treatment. Add neulasta to treatment tomorrow (probably needs auth). RTC in 2 weeks with labs

## 2025-04-01 ENCOUNTER — DOCUMENTATION ONLY (OUTPATIENT)
Dept: NUTRITION | Facility: HOSPITAL | Age: 60
End: 2025-04-01

## 2025-04-01 ENCOUNTER — INFUSION (OUTPATIENT)
Dept: INFUSION THERAPY | Facility: HOSPITAL | Age: 60
End: 2025-04-01
Attending: INTERNAL MEDICINE
Payer: COMMERCIAL

## 2025-04-01 VITALS
OXYGEN SATURATION: 99 % | HEART RATE: 69 BPM | HEIGHT: 67 IN | RESPIRATION RATE: 18 BRPM | DIASTOLIC BLOOD PRESSURE: 86 MMHG | BODY MASS INDEX: 28.96 KG/M2 | SYSTOLIC BLOOD PRESSURE: 133 MMHG | TEMPERATURE: 98 F | WEIGHT: 184.5 LBS

## 2025-04-01 DIAGNOSIS — C15.5 MALIGNANT NEOPLASM OF ABDOMINAL ESOPHAGUS: Primary | ICD-10-CM

## 2025-04-01 PROCEDURE — 96413 CHEMO IV INFUSION 1 HR: CPT

## 2025-04-01 PROCEDURE — 25000003 PHARM REV CODE 250: Performed by: INTERNAL MEDICINE

## 2025-04-01 PROCEDURE — 96367 TX/PROPH/DG ADDL SEQ IV INF: CPT

## 2025-04-01 PROCEDURE — 96415 CHEMO IV INFUSION ADDL HR: CPT

## 2025-04-01 PROCEDURE — 63600175 PHARM REV CODE 636 W HCPCS: Performed by: INTERNAL MEDICINE

## 2025-04-01 PROCEDURE — 96416 CHEMO PROLONG INFUSE W/PUMP: CPT

## 2025-04-01 PROCEDURE — 96368 THER/DIAG CONCURRENT INF: CPT

## 2025-04-01 PROCEDURE — 96366 THER/PROPH/DIAG IV INF ADDON: CPT

## 2025-04-01 PROCEDURE — 96417 CHEMO IV INFUS EACH ADDL SEQ: CPT

## 2025-04-01 RX ORDER — HEPARIN 100 UNIT/ML
500 SYRINGE INTRAVENOUS
Status: DISCONTINUED | OUTPATIENT
Start: 2025-04-01 | End: 2025-04-01 | Stop reason: HOSPADM

## 2025-04-01 RX ORDER — DIPHENHYDRAMINE HYDROCHLORIDE 50 MG/ML
50 INJECTION, SOLUTION INTRAMUSCULAR; INTRAVENOUS ONCE AS NEEDED
Status: DISCONTINUED | OUTPATIENT
Start: 2025-04-01 | End: 2025-04-01 | Stop reason: HOSPADM

## 2025-04-01 RX ORDER — EPINEPHRINE 0.3 MG/.3ML
0.3 INJECTION SUBCUTANEOUS ONCE AS NEEDED
Status: DISCONTINUED | OUTPATIENT
Start: 2025-04-01 | End: 2025-04-01 | Stop reason: HOSPADM

## 2025-04-01 RX ORDER — SODIUM CHLORIDE 0.9 % (FLUSH) 0.9 %
10 SYRINGE (ML) INJECTION
Status: DISCONTINUED | OUTPATIENT
Start: 2025-04-01 | End: 2025-04-01 | Stop reason: HOSPADM

## 2025-04-01 RX ADMIN — LEUCOVORIN CALCIUM 400 MG: 350 INJECTION, POWDER, LYOPHILIZED, FOR SOLUTION INTRAMUSCULAR; INTRAVENOUS at 11:04

## 2025-04-01 RX ADMIN — SODIUM CHLORIDE 0.25 MG: 9 INJECTION, SOLUTION INTRAVENOUS at 10:04

## 2025-04-01 RX ADMIN — DOCETAXEL ANHYDROUS 100 MG: 10 INJECTION, SOLUTION INTRAVENOUS at 10:04

## 2025-04-01 RX ADMIN — FLUOROURACIL 5175 MG: 50 INJECTION, SOLUTION INTRAVENOUS at 01:04

## 2025-04-01 RX ADMIN — DEXTROSE MONOHYDRATE: 5 INJECTION INTRAVENOUS at 10:04

## 2025-04-01 RX ADMIN — OXALIPLATIN 169 MG: 5 INJECTION, SOLUTION INTRAVENOUS at 11:04

## 2025-04-01 NOTE — PROGRESS NOTES
Medical Nutrition Therapy Oncology Progress Note      Patient's PCP:Sierra, Primary Doctor  Referring Provider: Dr. Whelan  Subjective:        Patient ID: Kwasi Brown is a 59 y.o. female.    Chief Complaint: Need for nutrition support d/t new diagnosis of esophageal cancer    Assessment:     Nutrition/Diet History     Patient Reported Diet/Restrictions/Preferences: Only able to tolerate pureed foods and liquids  Food Allergies: none  Factors Affecting Nutritional Intake: swallowing difficulty r/t esophageal cancer    Estimated/Assessed Needs     Weight Used For Calorie Calculations: 97 kg (213 lb)  Energy Calorie Requirements (kcal): 5529-0072 kcal/day   Energy Need Method: 25-30 Kcal/kg  Protein Requirements:  g/day   Protein Need Method: 1-1.5 g/kg  Fluid Requirements: 2425 ml/day  Estimated Fluid Requirement Method: 1ml/kcal      Nutrition Support  EN recommendations if TF is required to meet 100% of estimated nutritional needs: Jevity 1.5, 7 cartons per day (1659mL total). Bolus 1 carton (237ml) 7x/day with 60ml FWF before and after each bolus. TF and FWF provides 2489 calories, 106g protein and 2101ml FW. Recommend extra 400ml FWF daily meet estimated fluid needs.      Evaluation of Received Nutrient/Fluid Intake     Calorie Intake: meeting needs  Protein Intake: meeting needs  Fluid Intake: meeting needs  Tolerance: tolerating  % Intake of Estimated Energy Needs: 50-75 %     Nutrition Diagnosis Related to (Etiology) As Evidenced By (Signs/Symptoms)   Inadequate energy intake Swallowing difficulty caused by esophageal carcinoma  Only able to consume pureed foods and liquids, weight loss of >30# in 3 months     RD Notes  Mrs. Kwasi Estrada had PEG tube placed yesterday d/t new diagnosis of esophageal carcinoma and inability to meet calorie/protein needs by mouth. She makes soups and is drink 1-2 protein shakes daily. She is drinking 2 bottles of water daily. Noted that her  taste is off and that she is feeling nauseated. Patient reports initial weight at home was 242# and today was 206# at home. RD provided sample of plant based Ensure, Ensure coupons and [employer] to take by mouth until tube feeding is set up. CW: 213#      2/24/25: Patient reports that she has been able to eat more solids since her taste has returned and has stopped using her PEG tube. Noted that tube feeding formula was causing GI issues, both Jevity 1.5 and [employer] Standard 1.4 but most GI issues are resolved, except that she is still struggling with diarrhea. RD provided handout on diarrhea, samples for Ensure Clear since patient noted that she cannot tolerate dairy products, sample of [employer] Peptide 1.5 and Biolyte. We also discussed importance of maintaining current weight, patient voiced understanding. CW: 192#    4/1/25: Patient reports still trying to get back to her normal routine since her hospitalization 2 weeks ago. She checks her weight daily, noted stable weight at home, eating 3 meals/day, no c/o N/V/D/C or swallowing difficulty at this time but worried about trying oral supplements d/t previous having nausea with them. Patient has good support with family and friends. She is not using her PEG tube at this time and noted that she didn't tolerate any TF well. RD encouraged patient to try using PEG at the end of the day if po intake was not adequate that day. She also wants to try protein bar or other high protein foods such as nut butter, pudding, egg to help increase her protein intake. CW: 184#     Nutrition Intervention:      Nutrition Intervention Texture-modified diet, Energy-modified diet, and Protein-modified diet   Goals/Expected Outcomes Continue mechanical soft/pureed high calorie, high protein food choices to meet estimated nutritional needs   Progress Progressing      Nutrition Intervention Enteral Nutrition  Volume   Goals/Expected Outcomes Continue EN as po intake declines    Progress Not progressing      Plan  Encouraged intake of high calorie and protein foods every 2-3 hours.   Encouraged pt to call with any questions/concerns.   Will f/u as needed.      Past Medical History:   Diagnosis Date    Esophageal cancer 2025    Hypertension     Obesity        Past Surgical History:   Procedure Laterality Date    ADENOIDECTOMY      COLONOSCOPY W/ POLYPECTOMY      Age 24 - polyps negative    HYSTERECTOMY  2009    Partial Hysterectomy    INSERTION OF TUNNELED CENTRAL VENOUS CATHETER (CVC) WITH SUBCUTANEOUS PORT Left 2025    Procedure: [id number];  Surgeon: Patrick Whelan Jr., MD;  Location: [facility];  Service: General;  Laterality: Left;    INSERTION, GASTROSTOMY TUBE, LAPAROSCOPIC N/A 2025    Procedure: INSERTION, GASTROSTOMY TUBE, LAPAROSCOPIC;  Surgeon: Patrick Whelan Jr., MD;  Location: [facility] OR;  Service: General;  Laterality: N/A;    KNEE RECONSTRUCTION, MEDIAL PATELLAR FEMORAL LIGAMENT Bilateral     left ALSO RIGHT KNEE SCOPE    TONSILLECTOMY         Social History     Socioeconomic History    Marital status:    Occupational History     Employer: [employer]   Tobacco Use    Smoking status: Former     Current packs/day: 0.00     Average packs/day: 0.3 packs/day for 12.0 years (3.0 ttl pk-yrs)     Types: Cigarettes     Start date: 1971     Quit date: 1983     Years since quittin.5    Smokeless tobacco: Never   Substance and Sexual Activity    Alcohol use: Yes     Comment: occasional    Drug use: No     Social Drivers of Health     Financial Resource Strain: Patient Declined (3/23/2025)    Overall Financial Resource Strain (CARDIA)     Difficulty of Paying Living Expenses: Patient declined   Food Insecurity: Patient Declined (3/23/2025)    Hunger Vital Sign     Worried About Running Out of Food in the Last Year: Patient declined     Ran Out of Food in the Last Year: Patient declined   Transportation Needs: Patient  Declined (3/23/2025)    PRAPARE - Transportation     Lack of Transportation (Medical): Patient declined     Lack of Transportation (Non-Medical): Patient declined   Physical Activity: Inactive (2/19/2025)    Exercise Vital Sign     Days of Exercise per Week: 0 days     Minutes of Exercise per Session: 0 min   Stress: Patient Declined (3/23/2025)    [employer] [place] - Occupational Stress Questionnaire     Feeling of Stress : Patient declined   Housing Stability: Patient Declined (3/23/2025)    Housing Stability Vital Sign     Unable to Pay for Housing in the Last Year: Patient declined     Homeless in the Last Year: Patient declined       Family History   Problem Relation Name Age of Onset    Tuberculosis Mother      COPD Mother      Heart disease Mother  70    Hypertension Mother      Hypertension Father      Heart disease Father  78    Diabetes Father      Diabetes Brother  1        type 1    Heart disease Brother      Hypertension Brother      Coronary artery disease Unknown      Hypertension Unknown      Diabetes Unknown         Review of patient's allergies indicates:  No Known Allergies    Current Outpatient Medications:     albuterol sulfate (PROAIR DIGIHALER) 90 mcg/actuation aebs, Inhale 2 puffs every 4 hours by inhalation route., Disp: , Rfl:     aluminum & magnesium hydroxide-simethicone (MYLANTA MAX STRENGTH) 400-400-40 mg/5 mL suspension, Take 10 mLs by mouth every 6 (six) hours as needed for Indigestion., Disp: , Rfl:     azelastine (ASTELIN) 137 mcg (0.1 %) nasal spray, 2 sprays by Nasal route 2 (two) times daily., Disp: , Rfl:     [Paused] benazepril-hydrochlorthiazide (LOTENSIN HCT) 20-12.5 mg per tablet, Take 1 tablet by mouth once daily. (Patient not taking: Reported on 3/31/2025), Disp: 90 tablet, Rfl: 3    cetirizine (ZYRTEC) 10 MG tablet, Take 10 mg by mouth once daily., Disp: , Rfl:     dexAMETHasone (DECADRON) 4 MG Tab, Take take 2 tablets (8 mg) by mouth twice daily  the day before chemotherapy and the day following chemotherapy, then take 2 tablets (8 mg) by mouth on day 3 of each chemotherapy cycle., Disp: 30 tablet, Rfl: 3    famotidine (PEPCID) 40 MG tablet, TAKE 2 TABLETS (80 MG TOTAL) BY MOUTH EVERY EVENING., Disp: 180 tablet, Rfl: 1    fluticasone propionate (FLONASE) 50 mcg/actuation nasal spray, 1 spray by Each Nostril route once daily., Disp: , Rfl:     HYDROcodone-acetaminophen (NORCO)  mg per tablet, Take 1 tablet by mouth every 6 (six) hours as needed for Pain., Disp: 60 tablet, Rfl: 0    LIDOcaine-prilocaine (EMLA) cream, Apply topically as needed (Apply to port site 30 minutes before access as needed). (Patient taking differently: Apply 1 g topically as needed (Apply to port site 30 minutes before access as needed).), Disp: 30 g, Rfl: 0    LORazepam (ATIVAN) 0.5 MG tablet, Take 1 tablet (0.5 mg total) by mouth every 6 (six) hours as needed for Anxiety (Nausea and vomiting)., Disp: 120 tablet, Rfl: 0    lysine 1,000 mg Tab, Take 1 tablet by mouth Daily., Disp: , Rfl:     multivitamin capsule, Take 1 capsule by mouth once daily., Disp: , Rfl:     OLANZapine (ZYPREXA) 5 MG tablet, Take 1 tablet the night of chemotherapy and for the following 3 nights., Disp: 30 tablet, Rfl: 2    ondansetron (ZOFRAN) 8 MG tablet, Take 1 tablet (8 mg total) by mouth every 12 (twelve) hours as needed for Nausea., Disp: 30 tablet, Rfl: 2    pantoprazole (PROTONIX) 20 MG tablet, TAKE 1 TABLET BY MOUTH EVERY DAY, Disp: 90 tablet, Rfl: 1    promethazine (PHENERGAN) 25 MG tablet, Take 1 tablet (25 mg total) by mouth every 6 (six) hours as needed for Nausea., Disp: 30 tablet, Rfl: 1    scopolamine (TRANSDERM-SCOP) 1.3-1.5 mg (1 mg over 3 days), Place 1 patch onto the skin every 72 hours., Disp: 10 patch, Rfl: 1  No current facility-administered medications for this visit.    Facility-Administered Medications Ordered in Other Visits:     0.9% NaCl 100 mL flush bag, , Intravenous, 1 time  in [facility], Khoobehi, Aurash, MD    diphenhydrAMINE injection 50 mg, 50 mg, Intravenous, Once PRN, Khoobehi, Aurash, MD    DOCEtaxel (TAXOTERE) 50 mg/m2 = 100 mg in 0.9% NaCl 295 mL chemo infusion, 50 mg/m2, Intravenous, 1 time in [facility], Khoobehi, Aurash, MD, Last Rate: 295 mL/hr at 04/01/25 1054, 100 mg at 04/01/25 1054    EPINEPHrine (EPIPEN) 0.3 mg/0.3 mL pen injection 0.3 mg, 0.3 mg, Intramuscular, Once PRN, Khoobehi, Aurash, MD    fluorouracil (Adrucil) 2,600 mg/m2 = 5,175 mg in 0.9% NaCl 250 mL chemo infusion, 2,600 mg/m2, Intravenous, over 24 hr, Khoobehi, Aurash, MD    heparin, porcine (PF) 100 unit/mL injection flush 500 Units, 500 Units, Intravenous, PRN, Khoobehi, Aurash, MD    hydrocortisone sodium succinate injection 100 mg, 100 mg, Intravenous, Once PRN, Khoobehi, Aurash, MD    leucovorin calcium 200 mg/m2 = 400 mg in D5W 305 mL infusion, 200 mg/m2, Intravenous, 1 time in [facility], Khoobehi, Aurash, MD    oxaliplatin (ELOXATIN) 85 mg/m2 = 169 mg in D5W 598.8 mL chemo infusion, 85 mg/m2, Intravenous, 1 time in [facility], Khoobehi, Aurash, MD    sodium chloride 0.9% flush 10 mL, 10 mL, Intravenous, PRN, Khoobehi, Aurash, MD    All medications and past history have been reviewed.    OP FLOT - FLUOROURACIL LEUCOVORIN OXALIPLATIN DOCETAXEL Q2W    Treatment Goal:   Curative    Status:   Active    Start Date:   1/15/2025 (Planned)    End Date:   4/24/2025 (Planned)    Provider:   Aurash Khoobehi, MD    Chemotherapy:   DOCEtaxel (TAXOTERE) in 0.9% NaCl 250 mL chemo infusion, 50 mg/m2, Intravenous, [facility] 1 time, 0 of 8 cycles    oxaliplatin (ELOXATIN) in D5W 500 mL chemo infusion, 85 mg/m2, Intravenous, [facility] 1 time, 0 of 8 cycles      Objective:      Wt Readings    04/01/25 83.7 kg (184 lb 8 oz)   03/20/25 86 kg (189 lb 9.5 oz)   03/11/25 87 kg (191 lb 12.8 oz)   02/24/25 87.5 kg (192 lb 12.8 oz)   02/21/25 88.9 kg (195 lb 15.8 oz)     Wt Readings   01/31/25 96.7 kg (213 lb 3 oz)  "  10/26/22 108 kg (238 lb)     Last Labs:  Last Labs:  Glucose   Date Value Ref Range Status   03/21/2025 124 (H) 70 - 110 mg/dL Final   03/20/2025 115 (H) 70 - 110 mg/dL Final     BUN   Date Value Ref Range Status   03/28/2025 12 8 - 23 mg/dL Final   03/24/2025 15 8 - 23 mg/dL Final     Creatinine   Date Value Ref Range Status   03/28/2025 0.8 0.5 - 1.4 mg/dL Final   03/24/2025 1.0 0.5 - 1.4 mg/dL Final     Sodium   Date Value Ref Range Status   03/28/2025 138 136 - 145 mmol/L Final   03/24/2025 139 136 - 145 mmol/L Final     Potassium   Date Value Ref Range Status   03/28/2025 3.6 3.5 - 5.1 mmol/L Final   03/24/2025 3.7 3.5 - 5.1 mmol/L Final     Phosphorus Level   Date Value Ref Range Status   03/23/2025 3.8 2.7 - 4.5 mg/dL Final     Calcium   Date Value Ref Range Status   03/28/2025 8.9 8.7 - 10.5 mg/dL Final   03/24/2025 8.6 (L) 8.7 - 10.5 mg/dL Final     No results found for: "PREALBUMIN"  Total Protein   Date Value Ref Range Status   03/21/2025 5.2 (L) 6.0 - 8.4 g/dL Final   03/20/2025 5.5 (L) 6.0 - 8.4 g/dL Final     Cholesterol   Date Value Ref Range Status   09/13/2024 208 (H) 120 - 199 mg/dL Final     Comment:     The National Cholesterol Education Program (NCEP) has set the  following guidelines (reference ranges) for Cholesterol:  Optimal.....................<200 mg/dL  Borderline High.............200-239 mg/dL  High........................> or = 240 mg/dL     10/26/2022 219 (H) 120 - 199 mg/dL Final     Comment:     The National Cholesterol Education Program (NCEP) has set the  following guidelines (reference ranges) for Cholesterol:  Optimal.....................<200 mg/dL  Borderline High.............200-239 mg/dL  High........................> or = 240 mg/dL       No results found for: "HGBA1C"  Hgb   Date Value Ref Range Status   03/28/2025 9.8 (L) 12.0 - 16.0 gm/dL Final   03/24/2025 9.0 (L) 12.0 - 16.0 gm/dL Final     Hct   Date Value Ref Range Status   03/28/2025 33.0 (L) 37.0 - 48.5 % Final " "  03/24/2025 30.0 (L) 37.0 - 48.5 % Final     No results found for: "IRON"  No components found for: "FROLATE"  No results found for: "[id number]"  WBC   Date Value Ref Range Status   03/28/2025 9.64 3.90 - 12.70 K/uL Final   03/24/2025 7.10 3.90 - 12.70 K/uL Final      Monitoring/Evaluation:      Monitor: po intake, weight, BM, tube feeding tolerance     Next Visit: f/u weekly or as needed during therapy    I have explained and the patient understands all of  the current recommendation(s). I have answered all of their questions to the best of my ability and to their complete satisfaction.   The patient is to continue with the current management plan.    Electronically signed by: Blanche Linares, MS, RDN, LDN, MALLY          "

## 2025-04-01 NOTE — PLAN OF CARE
Problem: Fatigue  Goal: Improved Activity Tolerance  Outcome: Progressing  Intervention: Promote Improved Energy  Flowsheets (Taken 4/1/2025 0307)  Fatigue Management: fatigue-related activity identified  Sleep/Rest Enhancement: noise level reduced  Activity Management: Up in chair - L3  Environmental Support: environmental consistency promoted

## 2025-04-02 ENCOUNTER — INFUSION (OUTPATIENT)
Dept: INFUSION THERAPY | Facility: HOSPITAL | Age: 60
End: 2025-04-02
Attending: INTERNAL MEDICINE
Payer: COMMERCIAL

## 2025-04-02 VITALS
OXYGEN SATURATION: 99 % | HEIGHT: 67 IN | BODY MASS INDEX: 29.33 KG/M2 | SYSTOLIC BLOOD PRESSURE: 121 MMHG | HEART RATE: 90 BPM | RESPIRATION RATE: 18 BRPM | WEIGHT: 186.88 LBS | TEMPERATURE: 98 F | DIASTOLIC BLOOD PRESSURE: 71 MMHG

## 2025-04-02 DIAGNOSIS — C15.5 MALIGNANT NEOPLASM OF ABDOMINAL ESOPHAGUS: Primary | ICD-10-CM

## 2025-04-02 PROCEDURE — A4216 STERILE WATER/SALINE, 10 ML: HCPCS | Performed by: INTERNAL MEDICINE

## 2025-04-02 PROCEDURE — 96361 HYDRATE IV INFUSION ADD-ON: CPT

## 2025-04-02 PROCEDURE — 96377 APPLICATON ON-BODY INJECTOR: CPT

## 2025-04-02 PROCEDURE — 63600175 PHARM REV CODE 636 W HCPCS: Performed by: INTERNAL MEDICINE

## 2025-04-02 PROCEDURE — 96365 THER/PROPH/DIAG IV INF INIT: CPT

## 2025-04-02 PROCEDURE — 25000003 PHARM REV CODE 250: Performed by: INTERNAL MEDICINE

## 2025-04-02 RX ORDER — HEPARIN 100 UNIT/ML
500 SYRINGE INTRAVENOUS
Status: DISCONTINUED | OUTPATIENT
Start: 2025-04-02 | End: 2025-04-02 | Stop reason: HOSPADM

## 2025-04-02 RX ORDER — SODIUM CHLORIDE 0.9 % (FLUSH) 0.9 %
10 SYRINGE (ML) INJECTION
Status: DISCONTINUED | OUTPATIENT
Start: 2025-04-02 | End: 2025-04-02 | Stop reason: HOSPADM

## 2025-04-02 RX ORDER — ONDANSETRON HCL IN 0.9 % NACL 8 MG/50 ML
8 INTRAVENOUS SOLUTION, PIGGYBACK (ML) INTRAVENOUS
Status: COMPLETED | OUTPATIENT
Start: 2025-04-02 | End: 2025-04-02

## 2025-04-02 RX ADMIN — SODIUM CHLORIDE 8 MG: 9 INJECTION, SOLUTION INTRAVENOUS at 01:04

## 2025-04-02 RX ADMIN — SODIUM CHLORIDE 1000 ML: 9 INJECTION, SOLUTION INTRAVENOUS at 01:04

## 2025-04-02 RX ADMIN — HEPARIN 500 UNITS: 100 SYRINGE at 03:04

## 2025-04-02 RX ADMIN — PEGFILGRASTIM 6 MG: KIT SUBCUTANEOUS at 02:04

## 2025-04-02 RX ADMIN — Medication 10 ML: at 03:04

## 2025-04-02 NOTE — PLAN OF CARE
Problem: Fatigue  Goal: Improved Activity Tolerance  Outcome: Progressing  Intervention: Promote Improved Energy  Flowsheets (Taken 4/2/2025 1301)  Fatigue Management: frequent rest breaks encouraged  Sleep/Rest Enhancement: regular sleep/rest pattern promoted  Activity Management:   Ambulated -L4   Up in chair - L3  Environmental Support:   calm environment promoted   rest periods encouraged

## 2025-04-07 ENCOUNTER — TELEPHONE (OUTPATIENT)
Dept: HEMATOLOGY/ONCOLOGY | Facility: CLINIC | Age: 60
End: 2025-04-07
Payer: COMMERCIAL

## 2025-04-11 ENCOUNTER — TELEPHONE (OUTPATIENT)
Dept: HEMATOLOGY/ONCOLOGY | Facility: CLINIC | Age: 60
End: 2025-04-11
Payer: COMMERCIAL

## 2025-04-11 ENCOUNTER — OFFICE VISIT (OUTPATIENT)
Dept: SURGERY | Facility: CLINIC | Age: 60
End: 2025-04-11
Payer: COMMERCIAL

## 2025-04-11 ENCOUNTER — LAB VISIT (OUTPATIENT)
Dept: LAB | Facility: HOSPITAL | Age: 60
End: 2025-04-11
Attending: INTERNAL MEDICINE
Payer: COMMERCIAL

## 2025-04-11 ENCOUNTER — DOCUMENTATION ONLY (OUTPATIENT)
Dept: HEMATOLOGY/ONCOLOGY | Facility: CLINIC | Age: 60
End: 2025-04-11
Payer: COMMERCIAL

## 2025-04-11 VITALS
HEART RATE: 90 BPM | WEIGHT: 180 LBS | DIASTOLIC BLOOD PRESSURE: 88 MMHG | SYSTOLIC BLOOD PRESSURE: 129 MMHG | HEIGHT: 67 IN | BODY MASS INDEX: 28.25 KG/M2 | TEMPERATURE: 98 F

## 2025-04-11 DIAGNOSIS — C15.5 MALIGNANT NEOPLASM OF ABDOMINAL ESOPHAGUS: ICD-10-CM

## 2025-04-11 DIAGNOSIS — E87.6 HYPOKALEMIA: Primary | ICD-10-CM

## 2025-04-11 DIAGNOSIS — C15.5 MALIGNANT NEOPLASM OF ABDOMINAL ESOPHAGUS: Primary | ICD-10-CM

## 2025-04-11 LAB
ALBUMIN SERPL-MCNC: 2.9 G/DL (ref 3.5–5.2)
ALP SERPL-CCNC: 100 UNIT/L (ref 55–135)
ALT SERPL-CCNC: 21 UNIT/L (ref 10–44)
ANION GAP (SMH): 11 MMOL/L (ref 8–16)
ANISOCYTOSIS BLD QL SMEAR: ABNORMAL
AST SERPL-CCNC: 25 UNIT/L (ref 10–40)
BILIRUB SERPL-MCNC: 0.4 MG/DL (ref 0.1–1)
BUN SERPL-MCNC: 12 MG/DL (ref 8–23)
CALCIUM SERPL-MCNC: 8.2 MG/DL (ref 8.7–10.5)
CHLORIDE SERPL-SCNC: 95 MMOL/L (ref 95–110)
CO2 SERPL-SCNC: 30 MMOL/L (ref 23–29)
CREAT SERPL-MCNC: 0.6 MG/DL (ref 0.5–1.4)
ERYTHROCYTE [DISTWIDTH] IN BLOOD BY AUTOMATED COUNT: 18.1 % (ref 11.5–14.5)
GFR SERPLBLD CREATININE-BSD FMLA CKD-EPI: >60 ML/MIN/1.73/M2
GLUCOSE SERPL-MCNC: 168 MG/DL (ref 70–110)
HCT VFR BLD AUTO: 32.9 % (ref 37–48.5)
HGB BLD-MCNC: 9.8 GM/DL (ref 12–16)
HYPOCHROMIA BLD QL SMEAR: ABNORMAL
LYMPHOCYTES NFR BLD MANUAL: 16 % (ref 18–48)
MAGNESIUM SERPL-MCNC: 1.5 MG/DL (ref 1.6–2.6)
MCH RBC QN AUTO: 25.6 PG (ref 27–31)
MCHC RBC AUTO-ENTMCNC: 29.8 G/DL (ref 32–36)
MCV RBC AUTO: 86 FL (ref 82–98)
METAMYELOCYTES NFR BLD MANUAL: 2 %
MONOCYTES NFR BLD MANUAL: 10 % (ref 4–15)
MYELOCYTES NFR BLD MANUAL: 2 %
NEUTROPHILS NFR BLD MANUAL: 66 % (ref 38–73)
NEUTS BAND NFR BLD MANUAL: 4 %
NUCLEATED RBC (/100WBC) (SMH): 0 /100 WBC
PLATELET # BLD AUTO: 158 K/UL (ref 150–450)
PLATELET BLD QL SMEAR: ABNORMAL
PMV BLD AUTO: 9.8 FL (ref 9.2–12.9)
POLYCHROMASIA BLD QL SMEAR: ABNORMAL
POTASSIUM SERPL-SCNC: 2.7 MMOL/L (ref 3.5–5.1)
PROT SERPL-MCNC: 6 GM/DL (ref 6–8.4)
RBC # BLD AUTO: 3.83 M/UL (ref 4–5.4)
SODIUM SERPL-SCNC: 136 MMOL/L (ref 136–145)
WBC # BLD AUTO: 7.16 K/UL (ref 3.9–12.7)

## 2025-04-11 PROCEDURE — 85025 COMPLETE CBC W/AUTO DIFF WBC: CPT

## 2025-04-11 PROCEDURE — 83735 ASSAY OF MAGNESIUM: CPT

## 2025-04-11 PROCEDURE — 36415 COLL VENOUS BLD VENIPUNCTURE: CPT

## 2025-04-11 PROCEDURE — 99999 PR PBB SHADOW E&M-EST. PATIENT-LVL IV: CPT | Mod: PBBFAC,,, | Performed by: SURGERY

## 2025-04-11 PROCEDURE — 80053 COMPREHEN METABOLIC PANEL: CPT

## 2025-04-11 RX ORDER — ALPRAZOLAM 0.5 MG/1
0.5 TABLET ORAL 3 TIMES DAILY PRN
COMMUNITY

## 2025-04-11 RX ORDER — POTASSIUM CHLORIDE 20 MEQ/1
20 TABLET, EXTENDED RELEASE ORAL DAILY
Qty: 30 TABLET | Refills: 11 | Status: SHIPPED | OUTPATIENT
Start: 2025-04-11 | End: 2026-04-11

## 2025-04-11 NOTE — TELEPHONE ENCOUNTER
Spoke with pt regarding critically low K result. Advised pt per provider that rx for potassium has been sent to pharmacy. Pt advised to take 1 tablet as soon as she picks it up and a 2nd tablet before bedtime then daily. She verbalized understanding.

## 2025-04-14 ENCOUNTER — OFFICE VISIT (OUTPATIENT)
Dept: HEMATOLOGY/ONCOLOGY | Facility: CLINIC | Age: 60
End: 2025-04-14
Payer: COMMERCIAL

## 2025-04-14 VITALS
WEIGHT: 188.25 LBS | SYSTOLIC BLOOD PRESSURE: 130 MMHG | BODY MASS INDEX: 29.55 KG/M2 | RESPIRATION RATE: 18 BRPM | OXYGEN SATURATION: 99 % | TEMPERATURE: 97 F | HEIGHT: 67 IN | HEART RATE: 86 BPM | DIASTOLIC BLOOD PRESSURE: 72 MMHG

## 2025-04-14 DIAGNOSIS — T45.1X5A CHEMOTHERAPY INDUCED NEUTROPENIA: ICD-10-CM

## 2025-04-14 DIAGNOSIS — D70.1 CHEMOTHERAPY INDUCED NEUTROPENIA: ICD-10-CM

## 2025-04-14 DIAGNOSIS — C15.5 MALIGNANT NEOPLASM OF ABDOMINAL ESOPHAGUS: Primary | ICD-10-CM

## 2025-04-14 DIAGNOSIS — E83.42 HYPOMAGNESEMIA: ICD-10-CM

## 2025-04-14 DIAGNOSIS — K22.89 ESOPHAGEAL MASS: ICD-10-CM

## 2025-04-14 PROCEDURE — 3078F DIAST BP <80 MM HG: CPT | Mod: CPTII,S$GLB,, | Performed by: INTERNAL MEDICINE

## 2025-04-14 PROCEDURE — 1111F DSCHRG MED/CURRENT MED MERGE: CPT | Mod: CPTII,S$GLB,, | Performed by: INTERNAL MEDICINE

## 2025-04-14 PROCEDURE — 3075F SYST BP GE 130 - 139MM HG: CPT | Mod: CPTII,S$GLB,, | Performed by: INTERNAL MEDICINE

## 2025-04-14 PROCEDURE — 3008F BODY MASS INDEX DOCD: CPT | Mod: CPTII,S$GLB,, | Performed by: INTERNAL MEDICINE

## 2025-04-14 PROCEDURE — G2211 COMPLEX E/M VISIT ADD ON: HCPCS | Mod: S$GLB,,, | Performed by: INTERNAL MEDICINE

## 2025-04-14 PROCEDURE — 99999 PR PBB SHADOW E&M-EST. PATIENT-LVL III: CPT | Mod: PBBFAC,,, | Performed by: INTERNAL MEDICINE

## 2025-04-14 PROCEDURE — 4010F ACE/ARB THERAPY RXD/TAKEN: CPT | Mod: CPTII,S$GLB,, | Performed by: INTERNAL MEDICINE

## 2025-04-14 PROCEDURE — 99215 OFFICE O/P EST HI 40 MIN: CPT | Mod: S$GLB,,, | Performed by: INTERNAL MEDICINE

## 2025-04-14 RX ORDER — HEPARIN 100 UNIT/ML
500 SYRINGE INTRAVENOUS
OUTPATIENT
Start: 2025-04-14

## 2025-04-14 RX ORDER — EPINEPHRINE 0.3 MG/.3ML
0.3 INJECTION SUBCUTANEOUS ONCE AS NEEDED
Status: CANCELLED | OUTPATIENT
Start: 2025-04-15

## 2025-04-14 RX ORDER — HEPARIN 100 UNIT/ML
500 SYRINGE INTRAVENOUS
Status: CANCELLED | OUTPATIENT
Start: 2025-04-16

## 2025-04-14 RX ORDER — LORAZEPAM/0.9% SODIUM CHLORIDE 100MG/0.1L
2 PLASTIC BAG, INJECTION (ML) INTRAVENOUS
Status: CANCELLED | OUTPATIENT
Start: 2025-04-14

## 2025-04-14 RX ORDER — DIPHENHYDRAMINE HYDROCHLORIDE 50 MG/ML
50 INJECTION, SOLUTION INTRAMUSCULAR; INTRAVENOUS ONCE AS NEEDED
Status: CANCELLED | OUTPATIENT
Start: 2025-04-15

## 2025-04-14 RX ORDER — SODIUM CHLORIDE 0.9 % (FLUSH) 0.9 %
10 SYRINGE (ML) INJECTION
Status: CANCELLED | OUTPATIENT
Start: 2025-04-14

## 2025-04-14 RX ORDER — ONDANSETRON HYDROCHLORIDE 8 MG/1
8 TABLET, FILM COATED ORAL EVERY 12 HOURS PRN
Qty: 30 TABLET | Refills: 2 | Status: SHIPPED | OUTPATIENT
Start: 2025-04-14 | End: 2026-04-14

## 2025-04-14 RX ORDER — HEPARIN 100 UNIT/ML
500 SYRINGE INTRAVENOUS
Status: CANCELLED | OUTPATIENT
Start: 2025-04-15

## 2025-04-14 RX ORDER — SODIUM CHLORIDE 0.9 % (FLUSH) 0.9 %
10 SYRINGE (ML) INJECTION
Status: CANCELLED | OUTPATIENT
Start: 2025-04-15

## 2025-04-14 RX ORDER — SODIUM CHLORIDE 0.9 % (FLUSH) 0.9 %
10 SYRINGE (ML) INJECTION
Status: CANCELLED | OUTPATIENT
Start: 2025-04-16

## 2025-04-14 NOTE — PROGRESS NOTES
Service Date:  4/14/25    Chief Complaint:  Esophageal carcinoma     Kwasi Brown is a 59 y.o. female here with its esophageal carcinoma.      Here for chemotherapy clearance.  Doing better after this last chemotherapy.  No complaints to me.  No hospitalization after SRS chemo.  Fighting through the treatment, continues to be losing weight.  Has trouble with appetite.    Review of Systems   Constitutional:  Positive for appetite change. Negative for unexpected weight change.   HENT: Negative.  Negative for mouth sores.    Eyes: Negative.  Negative for visual disturbance.   Respiratory:  Positive for cough. Negative for shortness of breath.    Cardiovascular: Negative.  Negative for chest pain.   Gastrointestinal:  Positive for diarrhea. Negative for abdominal pain.   Endocrine: Negative.    Genitourinary: Negative.  Negative for frequency.   Musculoskeletal:  Positive for back pain.   Integumentary:  Negative for rash. Negative.   Neurological: Negative.  Negative for headaches.   Hematological: Negative.  Negative for adenopathy.   Psychiatric/Behavioral: Negative.  The patient is not nervous/anxious.         Current Outpatient Medications   Medication Instructions    albuterol sulfate (PROAIR DIGIHALER) 90 mcg/actuation aebs Inhale 2 puffs every 4 hours by inhalation route.    ALPRAZolam (XANAX) 0.5 mg, 3 times daily PRN    aluminum & magnesium hydroxide-simethicone (MYLANTA MAX STRENGTH) 400-400-40 mg/5 mL suspension 10 mLs, Every 6 hours PRN    azelastine (ASTELIN) 137 mcg (0.1 %) nasal spray 2 sprays, 2 times daily    [Paused] benazepril-hydrochlorthiazide (LOTENSIN HCT) 20-12.5 mg per tablet 1 tablet, Oral, Daily    cetirizine (ZYRTEC) 10 mg, Daily    dexAMETHasone (DECADRON) 4 MG Tab Take take 2 tablets (8 mg) by mouth twice daily the day before chemotherapy and the day following chemotherapy, then take 2 tablets (8 mg) by mouth on day 3 of each chemotherapy cycle.    famotidine (PEPCID) 80 mg, Oral,  Nightly    fluticasone propionate (FLONASE) 50 mcg/actuation nasal spray 1 spray, Daily    HYDROcodone-acetaminophen (NORCO)  mg per tablet 1 tablet, Oral, Every 6 hours PRN    LIDOcaine-prilocaine (EMLA) cream Topical (Top), As needed (PRN)    LORazepam (ATIVAN) 0.5 mg, Oral, Every 6 hours PRN    lysine 1,000 mg Tab 1 tablet, Daily    multivitamin capsule 1 capsule, Daily    OLANZapine (ZYPREXA) 5 MG tablet Take 1 tablet the night of chemotherapy and for the following 3 nights.    ondansetron (ZOFRAN) 8 mg, Oral, Every 12 hours PRN    pantoprazole (PROTONIX) 20 mg, Oral    potassium chloride SA (K-DUR,KLOR-CON) 20 MEQ tablet 20 mEq, Oral, Daily    promethazine (PHENERGAN) 25 mg, Oral, Every 6 hours PRN    scopolamine (TRANSDERM-SCOP) 1.3-1.5 mg (1 mg over 3 days) 1 patch, Transdermal, Every 72 hours        Past Medical History:   Diagnosis Date    Esophageal cancer 01/04/2025    Hypertension 2009    Obesity         Past Surgical History:   Procedure Laterality Date    ADENOIDECTOMY      COLONOSCOPY W/ POLYPECTOMY  1989    Age 24 - polyps negative    HYSTERECTOMY  2009    Partial Hysterectomy    INSERTION OF TUNNELED CENTRAL VENOUS CATHETER (CVC) WITH SUBCUTANEOUS PORT Left 1/30/2025    Procedure: [id number];  Surgeon: Patrick Whelan Jr., MD;  Location: [facility] OR;  Service: General;  Laterality: Left;    INSERTION, GASTROSTOMY TUBE, LAPAROSCOPIC N/A 1/30/2025    Procedure: INSERTION, GASTROSTOMY TUBE, LAPAROSCOPIC;  Surgeon: Patrick Whelan Jr., MD;  Location: [facility] OR;  Service: General;  Laterality: N/A;    KNEE RECONSTRUCTION, MEDIAL PATELLAR FEMORAL LIGAMENT Bilateral     left ALSO RIGHT KNEE SCOPE    TONSILLECTOMY          Family History   Problem Relation Name Age of Onset    Tuberculosis Mother      COPD Mother      Heart disease Mother  70    Hypertension Mother      Hypertension Father      Heart disease Father  78    Diabetes Father      Diabetes Brother  1        type 1    Heart  "disease Brother      Hypertension Brother      Coronary artery disease Unknown      Hypertension Unknown      Diabetes Unknown         Social History     Tobacco Use    Smoking status: Former     Current packs/day: 0.00     Average packs/day: 0.3 packs/day for 12.0 years (3.0 ttl pk-yrs)     Types: Cigarettes     Start date: 1971     Quit date: 1983     Years since quittin.6    Smokeless tobacco: Never   Substance Use Topics    Alcohol use: Yes     Comment: occasional    Drug use: No         Vitals:    25 1401   BP: 130/72   Pulse: 86   Resp: 18   Temp: 97.3 °F (36.3 °C)        Physical Exam:  /72 (BP Location: Right forearm, Patient Position: Sitting)   Pulse 86   Temp 97.3 °F (36.3 °C) (Temporal)   Resp 18   Ht 5' 7" (1.702 m)   Wt 85.4 kg (188 lb 4.4 oz)   SpO2 99%   BMI 29.49 kg/m²     Physical Exam  Constitutional:       Appearance: Normal appearance.   HENT:      Head: Normocephalic and atraumatic.      Nose: Nose normal.      Mouth/Throat:      Mouth: Mucous membranes are moist.      Pharynx: Oropharynx is clear.   Eyes:      Conjunctiva/sclera: Conjunctivae normal.   Cardiovascular:      Rate and Rhythm: Normal rate and regular rhythm.      Heart sounds: Normal heart sounds.   Pulmonary:      Effort: Pulmonary effort is normal.      Breath sounds: Normal breath sounds.   Abdominal:      General: Abdomen is flat. Bowel sounds are normal.      Palpations: Abdomen is soft.   Musculoskeletal:         General: Normal range of motion.      Cervical back: Normal range of motion and neck supple.   Skin:     General: Skin is warm and dry.   Neurological:      General: No focal deficit present.      Mental Status: She is alert and oriented to person, place, and time. Mental status is at baseline.   Psychiatric:         Mood and Affect: Mood normal.          Labs:  Lab Results   Component Value Date    WBC 7.16 2025    RBC 3.83 (L) 2025    HGB 9.8 (L) 2025    HCT 32.9 " (L) 04/11/2025    MCV 86 04/11/2025    MCH 25.6 (L) 04/11/2025    MCHC 29.8 (L) 04/11/2025    RDW 18.1 (H) 04/11/2025     04/11/2025    MPV 9.8 04/11/2025    GRAN 1.4 (L) 03/21/2025    GRAN 49.8 03/21/2025    LYMPH 0.6 (L) 03/21/2025    LYMPH 19.6 03/21/2025    MONO 0.7 03/21/2025    MONO 25.3 (H) 03/21/2025    EOS 0.0 03/21/2025    BASO 0.02 03/21/2025    EOSINOPHIL 0.0 03/21/2025    BASOPHIL 0.7 03/21/2025     Sodium   Date Value Ref Range Status   04/11/2025 136 136 - 145 mmol/L Final     Potassium   Date Value Ref Range Status   04/11/2025 2.7 (LL) 3.5 - 5.1 mmol/L Final     Comment:     Critical result K 2.7 mmol/L called to and read back by rebekah Valle/onc, RN. at 11-Apr-2025 12:48 [facility].  Result Rechecked     Chloride   Date Value Ref Range Status   03/21/2025 98 95 - 110 mmol/L Final     CO2   Date Value Ref Range Status   04/11/2025 30 (H) 23 - 29 mmol/L Final     Glucose   Date Value Ref Range Status   03/21/2025 124 (H) 70 - 110 mg/dL Final     BUN   Date Value Ref Range Status   04/11/2025 12 8 - 23 mg/dL Final     Creatinine   Date Value Ref Range Status   04/11/2025 0.6 0.5 - 1.4 mg/dL Final     Calcium   Date Value Ref Range Status   04/11/2025 8.2 (L) 8.7 - 10.5 mg/dL Final     Total Protein   Date Value Ref Range Status   03/21/2025 5.2 (L) 6.0 - 8.4 g/dL Final     Albumin   Date Value Ref Range Status   04/11/2025 2.9 (L) 3.5 - 5.2 g/dL Final     Bilirubin Total   Date Value Ref Range Status   04/11/2025 0.4 0.1 - 1.0 mg/dL Final     Comment:     For infants and newborns, interpretation of results should be based   on gestational age, weight and in agreement with clinical   observations.    Premature Infant recommended reference ranges:   0-24 hours:  <8.0 mg/dL   24-48 hours: <12.0 mg/dL   3-5 days:    <15.0 mg/dL   6-29 days:   <15.0 mg/dL     ALP   Date Value Ref Range Status   04/11/2025 100 55 - 135 unit/L Final     AST   Date Value Ref Range Status   04/11/2025 25 10  - 40 unit/L Final     ALT   Date Value Ref Range Status   04/11/2025 21 10 - 44 unit/L Final     Anion Gap   Date Value Ref Range Status   03/21/2025 6 (L) 8 - 16 mmol/L Final     eGFR if    Date Value Ref Range Status   02/23/2022 >60.0 >60 mL/min/1.73 m^2 Final     eGFR if non    Date Value Ref Range Status   02/23/2022 >60.0 >60 mL/min/1.73 m^2 Final     Comment:     Calculation used to obtain the estimated glomerular filtration  rate (eGFR) is the CKD-EPI equation.          A/P:    Poorly differentiated carcinoma of the esophagus  -PET scan unfortunately reveals a supraclavicular lymph node that is positive with a SUV of 9  -recommend palliative radiation to start with transition to chemotherapy after   -completed palliative radiation   -okay to proceed with chemotherapy with FLOT.  Patient does have a CPS score of 1.  However on [facility] testing, it did not recommend nivolumab.  I will be in contact with them to try to learn why and to added on later.  For now will continue with FLOT.  -okay for cycle 4.  Check CT chest abdomen pelvis prior to cycle 5.     Chemotherapy-induced neutropenia   -Neulasta added to plan.      Aurash Khoobehi, MD  Hematology and Oncology    Visit today included increased complexity associated with the care of the episodic problem esophageal cancer addressed and managing the longitudinal care of the patient due to the serious and/or complex managed problem(s).

## 2025-04-14 NOTE — Clinical Note
Ok for treatment. RTC in 2 weeks for next. CT chest abd pelvis in 2 weeks before next treatment. Potassium level tomorrow (can do it before or after chemo).

## 2025-04-15 ENCOUNTER — INFUSION (OUTPATIENT)
Dept: INFUSION THERAPY | Facility: HOSPITAL | Age: 60
End: 2025-04-15
Attending: INTERNAL MEDICINE
Payer: COMMERCIAL

## 2025-04-15 VITALS
HEIGHT: 67 IN | DIASTOLIC BLOOD PRESSURE: 82 MMHG | SYSTOLIC BLOOD PRESSURE: 128 MMHG | TEMPERATURE: 97 F | HEART RATE: 73 BPM | OXYGEN SATURATION: 100 % | WEIGHT: 188.63 LBS | RESPIRATION RATE: 18 BRPM | BODY MASS INDEX: 29.61 KG/M2

## 2025-04-15 DIAGNOSIS — D70.1 CHEMOTHERAPY-INDUCED NEUTROPENIA: Primary | ICD-10-CM

## 2025-04-15 DIAGNOSIS — C15.5 MALIGNANT NEOPLASM OF ABDOMINAL ESOPHAGUS: Primary | ICD-10-CM

## 2025-04-15 DIAGNOSIS — E83.42 HYPOMAGNESEMIA: ICD-10-CM

## 2025-04-15 DIAGNOSIS — T45.1X5A CHEMOTHERAPY-INDUCED NEUTROPENIA: Primary | ICD-10-CM

## 2025-04-15 LAB — POTASSIUM SERPL-SCNC: 3.8 MMOL/L (ref 3.5–5.1)

## 2025-04-15 PROCEDURE — 36415 COLL VENOUS BLD VENIPUNCTURE: CPT

## 2025-04-15 PROCEDURE — 96413 CHEMO IV INFUSION 1 HR: CPT

## 2025-04-15 PROCEDURE — 63600175 PHARM REV CODE 636 W HCPCS: Performed by: INTERNAL MEDICINE

## 2025-04-15 PROCEDURE — 96367 TX/PROPH/DG ADDL SEQ IV INF: CPT

## 2025-04-15 PROCEDURE — 96366 THER/PROPH/DIAG IV INF ADDON: CPT

## 2025-04-15 PROCEDURE — 96416 CHEMO PROLONG INFUSE W/PUMP: CPT

## 2025-04-15 PROCEDURE — 96417 CHEMO IV INFUS EACH ADDL SEQ: CPT

## 2025-04-15 PROCEDURE — 96415 CHEMO IV INFUSION ADDL HR: CPT

## 2025-04-15 PROCEDURE — 84132 ASSAY OF SERUM POTASSIUM: CPT | Performed by: INTERNAL MEDICINE

## 2025-04-15 PROCEDURE — 25000003 PHARM REV CODE 250: Performed by: INTERNAL MEDICINE

## 2025-04-15 PROCEDURE — 96368 THER/DIAG CONCURRENT INF: CPT

## 2025-04-15 RX ORDER — SODIUM CHLORIDE 0.9 % (FLUSH) 0.9 %
10 SYRINGE (ML) INJECTION
Status: DISCONTINUED | OUTPATIENT
Start: 2025-04-15 | End: 2025-04-15 | Stop reason: HOSPADM

## 2025-04-15 RX ORDER — EPINEPHRINE 0.3 MG/.3ML
0.3 INJECTION SUBCUTANEOUS ONCE AS NEEDED
Status: DISCONTINUED | OUTPATIENT
Start: 2025-04-15 | End: 2025-04-15 | Stop reason: HOSPADM

## 2025-04-15 RX ORDER — DIPHENHYDRAMINE HYDROCHLORIDE 50 MG/ML
50 INJECTION, SOLUTION INTRAMUSCULAR; INTRAVENOUS ONCE AS NEEDED
Status: DISCONTINUED | OUTPATIENT
Start: 2025-04-15 | End: 2025-04-15 | Stop reason: HOSPADM

## 2025-04-15 RX ADMIN — LEUCOVORIN CALCIUM 400 MG: 350 INJECTION, POWDER, LYOPHILIZED, FOR SOLUTION INTRAMUSCULAR; INTRAVENOUS at 10:04

## 2025-04-15 RX ADMIN — MAGNESIUM SULFATE HEPTAHYDRATE 2 G: 500 INJECTION, SOLUTION INTRAMUSCULAR; INTRAVENOUS at 10:04

## 2025-04-15 RX ADMIN — SODIUM CHLORIDE 0.25 MG: 9 INJECTION, SOLUTION INTRAVENOUS at 08:04

## 2025-04-15 RX ADMIN — DOCETAXEL ANHYDROUS 101 MG: 10 INJECTION, SOLUTION INTRAVENOUS at 09:04

## 2025-04-15 RX ADMIN — OXALIPLATIN 171 MG: 5 INJECTION, SOLUTION INTRAVENOUS at 10:04

## 2025-04-15 RX ADMIN — FLUOROURACIL 5225 MG: 50 INJECTION, SOLUTION INTRAVENOUS at 12:04

## 2025-04-15 NOTE — PLAN OF CARE
Problem: Fatigue  Goal: Improved Activity Tolerance  Intervention: Promote Improved Energy  Flowsheets (Taken 4/15/2025 7019)  Fatigue Management: fatigue-related activity identified  Activity Management: Ambulated -L4

## 2025-04-16 ENCOUNTER — INFUSION (OUTPATIENT)
Dept: INFUSION THERAPY | Facility: HOSPITAL | Age: 60
End: 2025-04-16
Attending: INTERNAL MEDICINE
Payer: COMMERCIAL

## 2025-04-16 VITALS
HEIGHT: 67 IN | OXYGEN SATURATION: 99 % | HEART RATE: 78 BPM | SYSTOLIC BLOOD PRESSURE: 114 MMHG | TEMPERATURE: 97 F | DIASTOLIC BLOOD PRESSURE: 78 MMHG | RESPIRATION RATE: 18 BRPM | WEIGHT: 189.88 LBS | BODY MASS INDEX: 29.8 KG/M2

## 2025-04-16 DIAGNOSIS — T45.1X5A CHEMOTHERAPY-INDUCED NEUTROPENIA: ICD-10-CM

## 2025-04-16 DIAGNOSIS — D70.1 CHEMOTHERAPY-INDUCED NEUTROPENIA: ICD-10-CM

## 2025-04-16 DIAGNOSIS — C15.5 MALIGNANT NEOPLASM OF ABDOMINAL ESOPHAGUS: Primary | ICD-10-CM

## 2025-04-16 PROCEDURE — 96361 HYDRATE IV INFUSION ADD-ON: CPT

## 2025-04-16 PROCEDURE — 63600175 PHARM REV CODE 636 W HCPCS: Mod: JZ,TB | Performed by: INTERNAL MEDICINE

## 2025-04-16 PROCEDURE — 96365 THER/PROPH/DIAG IV INF INIT: CPT

## 2025-04-16 PROCEDURE — 25000003 PHARM REV CODE 250: Performed by: INTERNAL MEDICINE

## 2025-04-16 PROCEDURE — 96375 TX/PRO/DX INJ NEW DRUG ADDON: CPT

## 2025-04-16 PROCEDURE — 96377 APPLICATON ON-BODY INJECTOR: CPT

## 2025-04-16 PROCEDURE — A4216 STERILE WATER/SALINE, 10 ML: HCPCS | Performed by: INTERNAL MEDICINE

## 2025-04-16 RX ORDER — LORAZEPAM 2 MG/ML
0.5 INJECTION INTRAMUSCULAR
Status: DISCONTINUED | OUTPATIENT
Start: 2025-04-16 | End: 2025-04-16 | Stop reason: HOSPADM

## 2025-04-16 RX ORDER — HEPARIN 100 UNIT/ML
500 SYRINGE INTRAVENOUS
Status: DISCONTINUED | OUTPATIENT
Start: 2025-04-16 | End: 2025-04-16 | Stop reason: HOSPADM

## 2025-04-16 RX ORDER — SODIUM CHLORIDE 0.9 % (FLUSH) 0.9 %
10 SYRINGE (ML) INJECTION
Status: DISCONTINUED | OUTPATIENT
Start: 2025-04-16 | End: 2025-04-16 | Stop reason: HOSPADM

## 2025-04-16 RX ORDER — ONDANSETRON HCL IN 0.9 % NACL 8 MG/50 ML
8 INTRAVENOUS SOLUTION, PIGGYBACK (ML) INTRAVENOUS
Status: COMPLETED | OUTPATIENT
Start: 2025-04-16 | End: 2025-04-16

## 2025-04-16 RX ADMIN — SODIUM CHLORIDE 8 MG: 9 INJECTION, SOLUTION INTRAVENOUS at 12:04

## 2025-04-16 RX ADMIN — Medication 10 ML: at 02:04

## 2025-04-16 RX ADMIN — HEPARIN 500 UNITS: 100 SYRINGE at 02:04

## 2025-04-16 RX ADMIN — PEGFILGRASTIM 6 MG: KIT SUBCUTANEOUS at 01:04

## 2025-04-16 RX ADMIN — LORAZEPAM 0.5 MG: 2 INJECTION INTRAMUSCULAR; INTRAVENOUS at 12:04

## 2025-04-16 RX ADMIN — SODIUM CHLORIDE 1000 ML: 9 INJECTION, SOLUTION INTRAVENOUS at 12:04

## 2025-04-16 NOTE — PLAN OF CARE
Problem: Fatigue  Goal: Improved Activity Tolerance  Outcome: Progressing  Intervention: Promote Improved Energy  Flowsheets (Taken 4/16/2025 6121)  Fatigue Management:   frequent rest breaks encouraged   paced activity encouraged  Sleep/Rest Enhancement: regular sleep/rest pattern promoted  Environmental Support: rest periods encouraged

## 2025-04-23 NOTE — PROGRESS NOTES
GENERAL SURGERY  POST-OP PROGRESS NOTE    HPI: Kwasi Brown is a 59 y.o. female status post Port-A-Cath insertion and laparoscopic G-tube placement. At the time of surgery there was initially some concern for injury to the back wall of the stomach however Gastrografin study failed to show any contrast extravasation in the tube appeared to be in appropriate position. She later developed pain and mild fevers. I ordered CT abdomen pelvis which again showed no significant abnormality in the tube in appropriate position.      Here today for follow-up.  Patient reports she is no longer requiring her G-tube and she is able to swallow and tolerate p.o. intake. Requesting G-tube removal which has been approved.    VITALS:  Vitals:    04/11/25 1016   BP: 129/88   Pulse: 90   Temp: 97.6 °F (36.4 °C)       PHYSICAL EXAM:  Port in place without signs of infection or breakdown  G-tube in place without signs of infection      ASSESSMENT & PLAN:  59 y.o. female s/p port and G tube placement  -tolerating p.o. intake and has not been using G-tube  -G-tube removed at bedside  -some expected drainage could occur for the next 2-10 days, if it persist beyond 2 weeks contact the office  -otherwise keep follow up with Oncology and Surgical Oncology  -return to clinic p.r.n.

## 2025-04-25 ENCOUNTER — HOSPITAL ENCOUNTER (OUTPATIENT)
Dept: RADIOLOGY | Facility: HOSPITAL | Age: 60
Discharge: HOME | End: 2025-04-25
Attending: INTERNAL MEDICINE
Payer: COMMERCIAL

## 2025-04-25 DIAGNOSIS — C15.5 MALIGNANT NEOPLASM OF ABDOMINAL ESOPHAGUS: ICD-10-CM

## 2025-04-25 PROCEDURE — 25500020 PHARM REV CODE 255: Mod: PO | Performed by: INTERNAL MEDICINE

## 2025-04-25 PROCEDURE — 71260 CT THORAX DX C+: CPT | Mod: 26,,, | Performed by: RADIOLOGY

## 2025-04-25 PROCEDURE — 71260 CT THORAX DX C+: CPT | Mod: TC,PO

## 2025-04-25 PROCEDURE — 74177 CT ABD & PELVIS W/CONTRAST: CPT | Mod: 26,,, | Performed by: RADIOLOGY

## 2025-04-25 RX ADMIN — IOHEXOL 100 ML: 350 INJECTION, SOLUTION INTRAVENOUS at 10:04

## 2025-04-28 ENCOUNTER — OFFICE VISIT (OUTPATIENT)
Dept: HEMATOLOGY/ONCOLOGY | Facility: CLINIC | Age: 60
End: 2025-04-28
Payer: COMMERCIAL

## 2025-04-28 VITALS
DIASTOLIC BLOOD PRESSURE: 65 MMHG | TEMPERATURE: 98 F | OXYGEN SATURATION: 98 % | SYSTOLIC BLOOD PRESSURE: 135 MMHG | BODY MASS INDEX: 29.34 KG/M2 | WEIGHT: 186.94 LBS | HEIGHT: 67 IN | HEART RATE: 90 BPM | RESPIRATION RATE: 17 BRPM

## 2025-04-28 DIAGNOSIS — D70.1 CHEMOTHERAPY-INDUCED NEUTROPENIA: ICD-10-CM

## 2025-04-28 DIAGNOSIS — C15.5 MALIGNANT NEOPLASM OF ABDOMINAL ESOPHAGUS: Primary | ICD-10-CM

## 2025-04-28 DIAGNOSIS — R51.9 ACUTE NONINTRACTABLE HEADACHE, UNSPECIFIED HEADACHE TYPE: ICD-10-CM

## 2025-04-28 DIAGNOSIS — T45.1X5A CHEMOTHERAPY-INDUCED NEUTROPENIA: ICD-10-CM

## 2025-04-28 PROCEDURE — 99999 PR PBB SHADOW E&M-EST. PATIENT-LVL V: CPT | Mod: PBBFAC,,, | Performed by: INTERNAL MEDICINE

## 2025-04-28 PROCEDURE — G2211 COMPLEX E/M VISIT ADD ON: HCPCS | Mod: S$GLB,,, | Performed by: INTERNAL MEDICINE

## 2025-04-28 PROCEDURE — 99214 OFFICE O/P EST MOD 30 MIN: CPT | Mod: S$GLB,,, | Performed by: INTERNAL MEDICINE

## 2025-04-28 PROCEDURE — 4010F ACE/ARB THERAPY RXD/TAKEN: CPT | Mod: CPTII,S$GLB,, | Performed by: INTERNAL MEDICINE

## 2025-04-28 PROCEDURE — 3008F BODY MASS INDEX DOCD: CPT | Mod: CPTII,S$GLB,, | Performed by: INTERNAL MEDICINE

## 2025-04-28 PROCEDURE — 3078F DIAST BP <80 MM HG: CPT | Mod: CPTII,S$GLB,, | Performed by: INTERNAL MEDICINE

## 2025-04-28 PROCEDURE — 1159F MED LIST DOCD IN RCRD: CPT | Mod: CPTII,S$GLB,, | Performed by: INTERNAL MEDICINE

## 2025-04-28 PROCEDURE — 3075F SYST BP GE 130 - 139MM HG: CPT | Mod: CPTII,S$GLB,, | Performed by: INTERNAL MEDICINE

## 2025-04-28 RX ORDER — CAPECITABINE 500 MG/1
1000 TABLET, FILM COATED ORAL 2 TIMES DAILY
Qty: 112 TABLET | Refills: 5 | Status: ACTIVE | OUTPATIENT
Start: 2025-04-28

## 2025-04-28 NOTE — PROGRESS NOTES
Service Date:  4/28/25    Chief Complaint:  Esophageal carcinoma     Kwasi Brown is a 59 y.o. female here with its esophageal carcinoma.      Here to discuss CT scan results and further plan.  Doing okay.  Tolerated last treatment well.  No new complaints.  Happy about the results of the CT scan by saddened that she can not have surgery.    Review of Systems   Constitutional:  Positive for appetite change. Negative for unexpected weight change.   HENT: Negative.  Negative for mouth sores.    Eyes: Negative.  Negative for visual disturbance.   Respiratory:  Positive for cough. Negative for shortness of breath.    Cardiovascular: Negative.  Negative for chest pain.   Gastrointestinal:  Positive for diarrhea. Negative for abdominal pain.   Endocrine: Negative.    Genitourinary: Negative.  Negative for frequency.   Musculoskeletal:  Positive for back pain.   Integumentary:  Negative for rash. Negative.   Neurological: Negative.  Negative for headaches.   Hematological: Negative.  Negative for adenopathy.   Psychiatric/Behavioral: Negative.  The patient is not nervous/anxious.         Current Outpatient Medications   Medication Instructions    albuterol sulfate (PROAIR DIGIHALER) 90 mcg/actuation aebs Inhale 2 puffs every 4 hours by inhalation route.    ALPRAZolam (XANAX) 0.5 mg, 3 times daily PRN    aluminum & magnesium hydroxide-simethicone (MYLANTA MAX STRENGTH) 400-400-40 mg/5 mL suspension 10 mLs, Every 6 hours PRN    azelastine (ASTELIN) 137 mcg (0.1 %) nasal spray 2 sprays, 2 times daily    [Paused] benazepril-hydrochlorthiazide (LOTENSIN HCT) 20-12.5 mg per tablet 1 tablet, Oral, Daily    capecitabine (XELODA) 1,000 mg/m2, Oral, 2 times daily, Take for 14 days and then stop for 7 days    cetirizine (ZYRTEC) 10 mg, Daily    famotidine (PEPCID) 80 mg, Oral, Nightly    fluticasone propionate (FLONASE) 50 mcg/actuation nasal spray 1 spray, Daily    HYDROcodone-acetaminophen (NORCO)  mg per tablet 1 tablet,  Oral, Every 6 hours PRN    LIDOcaine-prilocaine (EMLA) cream Topical (Top), As needed (PRN)    LORazepam (ATIVAN) 0.5 mg, Oral, Every 6 hours PRN    lysine 1,000 mg Tab 1 tablet, Daily    multivitamin capsule 1 capsule, Daily    OLANZapine (ZYPREXA) 5 MG tablet Take 1 tablet the night of chemotherapy and for the following 3 nights.    ondansetron (ZOFRAN) 8 mg, Oral, Every 12 hours PRN    pantoprazole (PROTONIX) 20 mg, Oral    potassium chloride SA (K-DUR,KLOR-CON) 20 MEQ tablet 20 mEq, Oral, Daily    promethazine (PHENERGAN) 25 mg, Oral, Every 6 hours PRN    scopolamine (TRANSDERM-SCOP) 1.3-1.5 mg (1 mg over 3 days) 1 patch, Transdermal, Every 72 hours        Past Medical History:   Diagnosis Date    Esophageal cancer 01/04/2025    Hypertension 2009    Obesity         Past Surgical History:   Procedure Laterality Date    ADENOIDECTOMY      COLONOSCOPY W/ POLYPECTOMY  1989    Age 24 - polyps negative    HYSTERECTOMY  2009    Partial Hysterectomy    INSERTION OF TUNNELED CENTRAL VENOUS CATHETER (CVC) WITH SUBCUTANEOUS PORT Left 1/30/2025    Procedure: [id number];  Surgeon: Patrick Whelan Jr., MD;  Location: [facility] OR;  Service: General;  Laterality: Left;    INSERTION, GASTROSTOMY TUBE, LAPAROSCOPIC N/A 1/30/2025    Procedure: INSERTION, GASTROSTOMY TUBE, LAPAROSCOPIC;  Surgeon: Patrick Whelan Jr., MD;  Location: [facility] OR;  Service: General;  Laterality: N/A;    KNEE RECONSTRUCTION, MEDIAL PATELLAR FEMORAL LIGAMENT Bilateral     left ALSO RIGHT KNEE SCOPE    TONSILLECTOMY          Family History   Problem Relation Name Age of Onset    Tuberculosis Mother      COPD Mother      Heart disease Mother  70    Hypertension Mother      Hypertension Father      Heart disease Father  78    Diabetes Father      Diabetes Brother  1        type 1    Heart disease Brother      Hypertension Brother      Coronary artery disease Unknown      Hypertension Unknown      Diabetes Unknown         Social History  "    Tobacco Use    Smoking status: Former     Current packs/day: 0.00     Average packs/day: 0.3 packs/day for 12.0 years (3.0 ttl pk-yrs)     Types: Cigarettes     Start date: 1971     Quit date: 1983     Years since quittin.6    Smokeless tobacco: Never   Substance Use Topics    Alcohol use: Yes     Comment: occasional    Drug use: No         Vitals:    25 1056   BP: 135/65   Pulse: 90   Resp: 17   Temp: 98 °F (36.7 °C)        Physical Exam:  /65 (BP Location: Right forearm, Patient Position: Sitting)   Pulse 90   Temp 98 °F (36.7 °C) (Temporal)   Resp 17   Ht 5' 7" (1.702 m)   Wt 84.8 kg (186 lb 15.2 oz)   SpO2 98%   BMI 29.28 kg/m²     Physical Exam  Constitutional:       Appearance: Normal appearance.   HENT:      Head: Normocephalic and atraumatic.      Nose: Nose normal.      Mouth/Throat:      Mouth: Mucous membranes are moist.      Pharynx: Oropharynx is clear.   Eyes:      Conjunctiva/sclera: Conjunctivae normal.   Cardiovascular:      Rate and Rhythm: Normal rate and regular rhythm.      Heart sounds: Normal heart sounds.   Pulmonary:      Effort: Pulmonary effort is normal.      Breath sounds: Normal breath sounds.   Abdominal:      General: Abdomen is flat. Bowel sounds are normal.      Palpations: Abdomen is soft.   Musculoskeletal:         General: Normal range of motion.      Cervical back: Normal range of motion and neck supple.   Skin:     General: Skin is warm and dry.   Neurological:      General: No focal deficit present.      Mental Status: She is alert and oriented to person, place, and time. Mental status is at baseline.   Psychiatric:         Mood and Affect: Mood normal.          Labs:  Lab Results   Component Value Date    WBC 14.25 (H) 2025    RBC 3.29 (L) 2025    HGB 8.5 (L) 2025    HCT 29.2 (L) 2025    MCV 89 2025    MCH 25.8 (L) 2025    MCHC 29.1 (L) 2025    RDW 20.5 (H) 2025    PLT 98 (L) 2025    MPV " 10.7 04/25/2025    GRAN 1.4 (L) 03/21/2025    GRAN 49.8 03/21/2025    LYMPH 0.6 (L) 03/21/2025    LYMPH 19.6 03/21/2025    MONO 0.7 03/21/2025    MONO 25.3 (H) 03/21/2025    EOS 0.0 03/21/2025    BASO 0.02 03/21/2025    EOSINOPHIL 0.0 03/21/2025    BASOPHIL 0.7 03/21/2025     Sodium   Date Value Ref Range Status   04/25/2025 132 (L) 136 - 145 mmol/L Final     Potassium   Date Value Ref Range Status   04/25/2025 4.1 3.5 - 5.1 mmol/L Final     Chloride   Date Value Ref Range Status   03/21/2025 98 95 - 110 mmol/L Final     CO2   Date Value Ref Range Status   04/25/2025 25 23 - 29 mmol/L Final     Glucose   Date Value Ref Range Status   03/21/2025 124 (H) 70 - 110 mg/dL Final     BUN   Date Value Ref Range Status   04/25/2025 15 8 - 23 mg/dL Final     Creatinine   Date Value Ref Range Status   04/25/2025 0.7 0.5 - 1.4 mg/dL Final     Calcium   Date Value Ref Range Status   04/25/2025 8.0 (L) 8.7 - 10.5 mg/dL Final     Total Protein   Date Value Ref Range Status   03/21/2025 5.2 (L) 6.0 - 8.4 g/dL Final     Albumin   Date Value Ref Range Status   04/25/2025 2.7 (L) 3.5 - 5.2 g/dL Final     Bilirubin Total   Date Value Ref Range Status   04/25/2025 0.3 0.1 - 1.0 mg/dL Final     Comment:     For infants and newborns, interpretation of results should be based   on gestational age, weight and in agreement with clinical   observations.    Premature Infant recommended reference ranges:   0-24 hours:  <8.0 mg/dL   24-48 hours: <12.0 mg/dL   3-5 days:    <15.0 mg/dL   6-29 days:   <15.0 mg/dL     ALP   Date Value Ref Range Status   04/25/2025 97 55 - 135 unit/L Final     AST   Date Value Ref Range Status   04/25/2025 29 10 - 40 unit/L Final     ALT   Date Value Ref Range Status   04/25/2025 22 10 - 44 unit/L Final     Anion Gap   Date Value Ref Range Status   03/21/2025 6 (L) 8 - 16 mmol/L Final     eGFR if    Date Value Ref Range Status   02/23/2022 >60.0 >60 mL/min/1.73 m^2 Final     eGFR if non African  American   Date Value Ref Range Status   02/23/2022 >60.0 >60 mL/min/1.73 m^2 Final     Comment:     Calculation used to obtain the estimated glomerular filtration  rate (eGFR) is the CKD-EPI equation.          A/P:    Poorly differentiated carcinoma of the esophagus  -PET scan unfortunately reveals a supraclavicular lymph node that is positive with a SUV of 9  -recommend palliative radiation to start with transition to chemotherapy after   -completed palliative radiation   -had 4 cycles of chemotherapy with FLOT.  Tolerated treatment pretty well.  Follow up CT scan shows good response to treatment.  I reviewed the CT scan with Dr. Anderson, who felt that the patient is not a surgical candidate given the metastases to the supraclavicular lymph node and response to chemo with this, helping to confirm metastases.  Therefore I will now switch her over to treatment with immunotherapy including oxaliplatin and capecitabine (for convenience versus 5 FU).  This will allow for eventual switch to maintenance therapy, and allow for discontinuation of the more harsh docetaxel.  -return to clinic when you chemotherapy is ready.  Capecitabine sent to 0 SP today.    Chemotherapy-induced neutropenia   -Neulasta added to plan.      Aurash Khoobehi, MD  Hematology and Oncology    Visit today included increased complexity associated with the care of the episodic problem esophageal cancer addressed and managing the longitudinal care of the patient due to the serious and/or complex managed problem(s).

## 2025-04-28 NOTE — PLAN OF CARE
DISCONTINUE ON PATHWAY REGIMEN - Gastroesophageal    [id number]        Docetaxel (Taxotere)       Oxaliplatin       Leucovorin       Fluorouracil     **Always confirm dose/schedule in your pharmacy ordering system**    REASON: Other Reason  PRIOR TREATMENT: [id number]  TREATMENT RESPONSE: Partial Response (MO)    START ON PATHWAY REGIMEN - Gastroesophageal    GEOS3        Oxaliplatin       Leucovorin       Fluorouracil       Fluorouracil     **Always confirm dose/schedule in your pharmacy ordering system**    Patient Characteristics:  Distant Metastases (cM1/pM1) / Locally Recurrent Disease, Adenocarcinoma -   Esophageal, GE Junction, and Gastric, First Line, HER2 Negative/Unknown, PD?L1   Expression CPS < 5/Negative/Unknown, JENIFFER/pMMR or MSI Unknown, CLDN18.2   Negative/Unknown  Therapeutic Status: Distant Metastases (No Additional Staging)  Histology: Adenocarcinoma  Disease Classification: Esophageal  Line of Therapy: First Line  HER2 Status: Negative  PD-L1 Expression Status: PD-L1 Expression CPS < 5  Microsatellite/Mismatch Repair Status: JENIFFER/pMMR  CLDN18.2 Status: CLDN18.2 Negative  Intent of Therapy:  Non-Curative / Palliative Intent, Discussed with Patient

## 2025-04-28 NOTE — PROGRESS NOTES
Service Date:  3/7/25    Chief Complaint:  Esophageal carcinoma     Kwasi Brown is a 59 y.o. female here with its esophageal carcinoma.      Here for cycle 2 of treatment. Tolerated cycle 1 ok. Had some acid reflux. Able to swallow and eat ok. No neuropathy.    Review of Systems   Constitutional:  Positive for appetite change. Negative for unexpected weight change.   HENT: Negative.  Negative for mouth sores.    Eyes: Negative.  Negative for visual disturbance.   Respiratory:  Positive for cough. Negative for shortness of breath.    Cardiovascular: Negative.  Negative for chest pain.   Gastrointestinal:  Positive for diarrhea. Negative for abdominal pain.   Endocrine: Negative.    Genitourinary: Negative.  Negative for frequency.   Musculoskeletal:  Positive for back pain.   Integumentary:  Negative for rash. Negative.   Neurological: Negative.  Negative for headaches.   Hematological: Negative.  Negative for adenopathy.   Psychiatric/Behavioral: Negative.  The patient is not nervous/anxious.         Current Outpatient Medications   Medication Instructions    albuterol sulfate (PROAIR DIGIHALER) 90 mcg/actuation aebs Inhale 2 puffs every 4 hours by inhalation route.    ALPRAZolam (XANAX) 0.5 mg, 3 times daily PRN    aluminum & magnesium hydroxide-simethicone (MYLANTA MAX STRENGTH) 400-400-40 mg/5 mL suspension 10 mLs, Every 6 hours PRN    azelastine (ASTELIN) 137 mcg (0.1 %) nasal spray 2 sprays, 2 times daily    [Paused] benazepril-hydrochlorthiazide (LOTENSIN HCT) 20-12.5 mg per tablet 1 tablet, Oral, Daily    cetirizine (ZYRTEC) 10 mg, Daily    dexAMETHasone (DECADRON) 4 MG Tab Take take 2 tablets (8 mg) by mouth twice daily the day before chemotherapy and the day following chemotherapy, then take 2 tablets (8 mg) by mouth on day 3 of each chemotherapy cycle.    famotidine (PEPCID) 80 mg, Oral, Nightly    fluticasone propionate (FLONASE) 50 mcg/actuation nasal spray 1 spray, Daily     HYDROcodone-acetaminophen (NORCO)  mg per tablet 1 tablet, Oral, Every 6 hours PRN    LIDOcaine-prilocaine (EMLA) cream Topical (Top), As needed (PRN)    LORazepam (ATIVAN) 0.5 mg, Oral, Every 6 hours PRN    lysine 1,000 mg Tab 1 tablet, Daily    multivitamin capsule 1 capsule, Daily    OLANZapine (ZYPREXA) 5 MG tablet Take 1 tablet the night of chemotherapy and for the following 3 nights.    ondansetron (ZOFRAN) 8 mg, Oral, Every 12 hours PRN    pantoprazole (PROTONIX) 20 mg, Oral    potassium chloride SA (K-DUR,KLOR-CON) 20 MEQ tablet 20 mEq, Oral, Daily    promethazine (PHENERGAN) 25 mg, Oral, Every 6 hours PRN    scopolamine (TRANSDERM-SCOP) 1.3-1.5 mg (1 mg over 3 days) 1 patch, Transdermal, Every 72 hours        Past Medical History:   Diagnosis Date    Esophageal cancer 01/04/2025    Hypertension 2009    Obesity         Past Surgical History:   Procedure Laterality Date    ADENOIDECTOMY      COLONOSCOPY W/ POLYPECTOMY  1989    Age 24 - polyps negative    HYSTERECTOMY  2009    Partial Hysterectomy    INSERTION OF TUNNELED CENTRAL VENOUS CATHETER (CVC) WITH SUBCUTANEOUS PORT Left 1/30/2025    Procedure: [id number];  Surgeon: Patrick Whelan Jr., MD;  Location: [facility] OR;  Service: General;  Laterality: Left;    INSERTION, GASTROSTOMY TUBE, LAPAROSCOPIC N/A 1/30/2025    Procedure: INSERTION, GASTROSTOMY TUBE, LAPAROSCOPIC;  Surgeon: Patrick Whelan Jr., MD;  Location: [facility] OR;  Service: General;  Laterality: N/A;    KNEE RECONSTRUCTION, MEDIAL PATELLAR FEMORAL LIGAMENT Bilateral     left ALSO RIGHT KNEE SCOPE    TONSILLECTOMY          Family History   Problem Relation Name Age of Onset    Tuberculosis Mother      COPD Mother      Heart disease Mother  70    Hypertension Mother      Hypertension Father      Heart disease Father  78    Diabetes Father      Diabetes Brother  1        type 1    Heart disease Brother      Hypertension Brother      Coronary artery disease Unknown       "Hypertension Unknown      Diabetes Unknown         Social History     Tobacco Use    Smoking status: Former     Current packs/day: 0.00     Average packs/day: 0.3 packs/day for 12.0 years (3.0 ttl pk-yrs)     Types: Cigarettes     Start date: 1971     Quit date: 1983     Years since quittin.6    Smokeless tobacco: Never   Substance Use Topics    Alcohol use: Yes     Comment: occasional    Drug use: No         Vitals:    25 1020   BP: 121/70   Pulse: 87   Resp: 18   Temp: 97.9 °F (36.6 °C)        Physical Exam:  /70 (BP Location: Right arm, Patient Position: Sitting)   Pulse 87   Temp 97.9 °F (36.6 °C) (Temporal)   Resp 18   Ht 5' 7" (1.702 m)   Wt 87.6 kg (193 lb 2 oz)   SpO2 100%   BMI 30.25 kg/m²     Physical Exam  Constitutional:       Appearance: Normal appearance.   HENT:      Head: Normocephalic and atraumatic.      Nose: Nose normal.      Mouth/Throat:      Mouth: Mucous membranes are moist.      Pharynx: Oropharynx is clear.   Eyes:      Conjunctiva/sclera: Conjunctivae normal.   Cardiovascular:      Rate and Rhythm: Normal rate and regular rhythm.      Heart sounds: Normal heart sounds.   Pulmonary:      Effort: Pulmonary effort is normal.      Breath sounds: Normal breath sounds.   Abdominal:      General: Abdomen is flat. Bowel sounds are normal.      Palpations: Abdomen is soft.   Musculoskeletal:         General: Normal range of motion.      Cervical back: Normal range of motion and neck supple.   Skin:     General: Skin is warm and dry.   Neurological:      General: No focal deficit present.      Mental Status: She is alert and oriented to person, place, and time. Mental status is at baseline.   Psychiatric:         Mood and Affect: Mood normal.          Labs:  Lab Results   Component Value Date    WBC 14.25 (H) 2025    RBC 3.29 (L) 2025    HGB 8.5 (L) 2025    HCT 29.2 (L) 2025    MCV 89 2025    MCH 25.8 (L) 2025    MCHC 29.1 (L) " 04/25/2025    RDW 20.5 (H) 04/25/2025    PLT 98 (L) 04/25/2025    MPV 10.7 04/25/2025    GRAN 1.4 (L) 03/21/2025    GRAN 49.8 03/21/2025    LYMPH 0.6 (L) 03/21/2025    LYMPH 19.6 03/21/2025    MONO 0.7 03/21/2025    MONO 25.3 (H) 03/21/2025    EOS 0.0 03/21/2025    BASO 0.02 03/21/2025    EOSINOPHIL 0.0 03/21/2025    BASOPHIL 0.7 03/21/2025     Sodium   Date Value Ref Range Status   04/25/2025 132 (L) 136 - 145 mmol/L Final     Potassium   Date Value Ref Range Status   04/25/2025 4.1 3.5 - 5.1 mmol/L Final     Chloride   Date Value Ref Range Status   03/21/2025 98 95 - 110 mmol/L Final     CO2   Date Value Ref Range Status   04/25/2025 25 23 - 29 mmol/L Final     Glucose   Date Value Ref Range Status   03/21/2025 124 (H) 70 - 110 mg/dL Final     BUN   Date Value Ref Range Status   04/25/2025 15 8 - 23 mg/dL Final     Creatinine   Date Value Ref Range Status   04/25/2025 0.7 0.5 - 1.4 mg/dL Final     Calcium   Date Value Ref Range Status   04/25/2025 8.0 (L) 8.7 - 10.5 mg/dL Final     Total Protein   Date Value Ref Range Status   03/21/2025 5.2 (L) 6.0 - 8.4 g/dL Final     Albumin   Date Value Ref Range Status   04/25/2025 2.7 (L) 3.5 - 5.2 g/dL Final     Bilirubin Total   Date Value Ref Range Status   04/25/2025 0.3 0.1 - 1.0 mg/dL Final     Comment:     For infants and newborns, interpretation of results should be based   on gestational age, weight and in agreement with clinical   observations.    Premature Infant recommended reference ranges:   0-24 hours:  <8.0 mg/dL   24-48 hours: <12.0 mg/dL   3-5 days:    <15.0 mg/dL   6-29 days:   <15.0 mg/dL     ALP   Date Value Ref Range Status   04/25/2025 97 55 - 135 unit/L Final     AST   Date Value Ref Range Status   04/25/2025 29 10 - 40 unit/L Final     ALT   Date Value Ref Range Status   04/25/2025 22 10 - 44 unit/L Final     Anion Gap   Date Value Ref Range Status   03/21/2025 6 (L) 8 - 16 mmol/L Final     eGFR if    Date Value Ref Range Status    02/23/2022 >60.0 >60 mL/min/1.73 m^2 Final     eGFR if non    Date Value Ref Range Status   02/23/2022 >60.0 >60 mL/min/1.73 m^2 Final     Comment:     Calculation used to obtain the estimated glomerular filtration  rate (eGFR) is the CKD-EPI equation.          A/P:    Poorly differentiated carcinoma of the esophagus  -PET scan unfortunately reveals a supraclavicular lymph node that is positive with a SUV of 9  -recommend palliative radiation to start with transition to chemotherapy after   -completed palliative radiation   -okay to proceed with chemotherapy with FLOT.  Patient does have a CPS score of 1.  However on [employer] testing, it did not recommend nivolumab.  -ok for cycle 2 of treatment    Hypokalemia  -will replete    GERD  -given protonix      Aurash Khoobehi, MD  Hematology and Oncology    Visit today included increased complexity associated with the care of the episodic problem esophageal cancer addressed and managing the longitudinal care of the patient due to the serious and/or complex managed problem(s).

## 2025-05-01 ENCOUNTER — PATIENT MESSAGE (OUTPATIENT)
Facility: CLINIC | Age: 60
End: 2025-05-01
Payer: COMMERCIAL

## 2025-05-02 ENCOUNTER — HOSPITAL ENCOUNTER (OUTPATIENT)
Dept: RADIOLOGY | Facility: HOSPITAL | Age: 60
Discharge: HOME | End: 2025-05-02
Attending: INTERNAL MEDICINE
Payer: COMMERCIAL

## 2025-05-02 DIAGNOSIS — R51.9 ACUTE NONINTRACTABLE HEADACHE, UNSPECIFIED HEADACHE TYPE: ICD-10-CM

## 2025-05-02 PROCEDURE — 25500020 PHARM REV CODE 255

## 2025-05-02 PROCEDURE — 70553 MRI BRAIN STEM W/O & W/DYE: CPT | Mod: TC

## 2025-05-02 PROCEDURE — A9585 GADOBUTROL INJECTION: HCPCS

## 2025-05-02 PROCEDURE — 70553 MRI BRAIN STEM W/O & W/DYE: CPT | Mod: 26,,, | Performed by: RADIOLOGY

## 2025-05-02 RX ORDER — GADOBUTROL 604.72 MG/ML
INJECTION INTRAVENOUS
Status: COMPLETED
Start: 2025-05-02 | End: 2025-05-02

## 2025-05-02 RX ADMIN — GADOBUTROL 8 ML: 604.72 INJECTION INTRAVENOUS at 06:05

## 2025-05-05 ENCOUNTER — TELEPHONE (OUTPATIENT)
Dept: HEMATOLOGY/ONCOLOGY | Facility: CLINIC | Age: 60
End: 2025-05-05
Payer: COMMERCIAL

## 2025-05-05 NOTE — TELEPHONE ENCOUNTER
Per MARCELO Ibarra pt said it wasn't chest pain. It comes and goes with or without activity. She described it as nerve pain. She also denied swelling or redness anywhere.     ----- Message from MARCELO Ibarra sent at 5/5/2025  9:40 AM CDT -----  Regarding: [employer] Denied/ Port site pain  Returned called to patient. Insurance has denied her CARIS testing. Emailed Kwasi Pedraza our [employer] representative and asked her to follow up clinic with any updates. Patient stated a prior authorization would need to be obtained. Also, patient is having intermittent pain at the base of neck close to her clavicle. She believes in may be related to her port. Please review and advise patient should anything be recommended as far as port flush, or port study.

## 2025-05-06 ENCOUNTER — HOSPITAL ENCOUNTER (OUTPATIENT)
Dept: RADIOLOGY | Facility: HOSPITAL | Age: 60
Discharge: HOME | End: 2025-05-06
Attending: NURSE PRACTITIONER
Payer: COMMERCIAL

## 2025-05-06 DIAGNOSIS — C15.5 MALIGNANT NEOPLASM OF ABDOMINAL ESOPHAGUS: ICD-10-CM

## 2025-05-06 DIAGNOSIS — C15.5 MALIGNANT NEOPLASM OF ABDOMINAL ESOPHAGUS: Primary | ICD-10-CM

## 2025-05-06 DIAGNOSIS — Z51.89 AFTER CARE: Primary | ICD-10-CM

## 2025-05-06 PROCEDURE — 36598 INJ W/FLUOR EVAL CV DEVICE: CPT

## 2025-05-06 PROCEDURE — 25500020 PHARM REV CODE 255: Performed by: NURSE PRACTITIONER

## 2025-05-06 RX ORDER — SODIUM CHLORIDE 0.9 % (FLUSH) 0.9 %
10 SYRINGE (ML) INJECTION
OUTPATIENT
Start: 2025-05-06

## 2025-05-06 RX ORDER — HEPARIN 100 UNIT/ML
500 SYRINGE INTRAVENOUS
OUTPATIENT
Start: 2025-05-06

## 2025-05-06 RX ADMIN — IOHEXOL 20 ML: 300 INJECTION, SOLUTION INTRAVENOUS at 12:05

## 2025-05-06 NOTE — TELEPHONE ENCOUNTER
Spoke with pt to notify that port study has been ordered.     Called scheduling to arrange port study.    Spoke with pt to advise she begin xeloda with start of chemo cycle. Pt verbalized understanding.

## 2025-05-08 PROBLEM — T82.868A: Status: ACTIVE | Noted: 2025-05-08

## 2025-05-09 ENCOUNTER — PATIENT MESSAGE (OUTPATIENT)
Dept: SURGERY | Facility: CLINIC | Age: 60
End: 2025-05-09
Payer: COMMERCIAL

## 2025-05-09 ENCOUNTER — INFUSION (OUTPATIENT)
Dept: INFUSION THERAPY | Facility: HOSPITAL | Age: 60
End: 2025-05-09
Attending: INTERNAL MEDICINE
Payer: COMMERCIAL

## 2025-05-09 ENCOUNTER — TELEPHONE (OUTPATIENT)
Dept: HEMATOLOGY/ONCOLOGY | Facility: CLINIC | Age: 60
End: 2025-05-09
Payer: COMMERCIAL

## 2025-05-09 VITALS
OXYGEN SATURATION: 100 % | SYSTOLIC BLOOD PRESSURE: 134 MMHG | WEIGHT: 194.31 LBS | RESPIRATION RATE: 16 BRPM | BODY MASS INDEX: 30.5 KG/M2 | HEIGHT: 67 IN | HEART RATE: 89 BPM | DIASTOLIC BLOOD PRESSURE: 78 MMHG | TEMPERATURE: 98 F

## 2025-05-09 DIAGNOSIS — Z78.9 PROBLEM WITH VASCULAR ACCESS: ICD-10-CM

## 2025-05-09 DIAGNOSIS — C15.5 MALIGNANT NEOPLASM OF ABDOMINAL ESOPHAGUS: Primary | ICD-10-CM

## 2025-05-09 DIAGNOSIS — C16.0 ADENOCARCINOMA OF GASTROESOPHAGEAL JUNCTION: ICD-10-CM

## 2025-05-09 DIAGNOSIS — T82.868A THROMBOSIS INVOLVING VASCULAR DEVICE, INITIAL ENCOUNTER: Primary | ICD-10-CM

## 2025-05-09 RX ORDER — CEFAZOLIN SODIUM 2 G/50ML
2 SOLUTION INTRAVENOUS
OUTPATIENT
Start: 2025-05-09

## 2025-05-09 NOTE — TELEPHONE ENCOUNTER
Spoke with pt to notify that she does not need to come in for appts on Monday. She will be out of town 17-23 so chemo will be r/s after that once port is replaced.

## 2025-05-14 ENCOUNTER — ANESTHESIA (OUTPATIENT)
Dept: SURGERY | Facility: HOSPITAL | Age: 60
End: 2025-05-14
Payer: COMMERCIAL

## 2025-05-14 ENCOUNTER — ANESTHESIA EVENT (OUTPATIENT)
Dept: SURGERY | Facility: HOSPITAL | Age: 60
End: 2025-05-14
Payer: COMMERCIAL

## 2025-05-14 ENCOUNTER — HOSPITAL ENCOUNTER (OUTPATIENT)
Facility: HOSPITAL | Age: 60
Discharge: HOME | End: 2025-05-14
Attending: SURGERY | Admitting: SURGERY
Payer: COMMERCIAL

## 2025-05-14 ENCOUNTER — HOSPITAL ENCOUNTER (OUTPATIENT)
Dept: RADIOLOGY | Facility: HOSPITAL | Age: 60
Discharge: HOME | End: 2025-05-14
Attending: SURGERY | Admitting: SURGERY
Payer: COMMERCIAL

## 2025-05-14 VITALS
WEIGHT: 185 LBS | HEART RATE: 71 BPM | RESPIRATION RATE: 17 BRPM | SYSTOLIC BLOOD PRESSURE: 115 MMHG | HEIGHT: 66 IN | OXYGEN SATURATION: 99 % | TEMPERATURE: 98 F | BODY MASS INDEX: 29.73 KG/M2 | DIASTOLIC BLOOD PRESSURE: 75 MMHG

## 2025-05-14 DIAGNOSIS — C15.5 MALIGNANT NEOPLASM OF ABDOMINAL ESOPHAGUS: ICD-10-CM

## 2025-05-14 DIAGNOSIS — K22.89 ESOPHAGEAL MASS: ICD-10-CM

## 2025-05-14 DIAGNOSIS — T82.868A THROMBOSIS INVOLVING VASCULAR DEVICE, INITIAL ENCOUNTER: Primary | ICD-10-CM

## 2025-05-14 DIAGNOSIS — C15.9 ESOPHAGEAL CARCINOMA: ICD-10-CM

## 2025-05-14 PROCEDURE — 37000009 HC ANESTHESIA EA ADD 15 MINS: Performed by: SURGERY

## 2025-05-14 PROCEDURE — 36000706: Performed by: SURGERY

## 2025-05-14 PROCEDURE — 71000015 HC POSTOP RECOV 1ST HR: Performed by: SURGERY

## 2025-05-14 PROCEDURE — 37000008 HC ANESTHESIA 1ST 15 MINUTES: Performed by: SURGERY

## 2025-05-14 PROCEDURE — 36000707: Performed by: SURGERY

## 2025-05-14 PROCEDURE — 77001 FLUOROGUIDE FOR VEIN DEVICE: CPT | Mod: 26,,, | Performed by: SURGERY

## 2025-05-14 PROCEDURE — 63600175 PHARM REV CODE 636 W HCPCS: Performed by: SURGERY

## 2025-05-14 PROCEDURE — C1788 PORT, INDWELLING, IMP: HCPCS | Performed by: SURGERY

## 2025-05-14 PROCEDURE — 25000003 PHARM REV CODE 250: Performed by: STUDENT IN AN ORGANIZED HEALTH CARE EDUCATION/TRAINING PROGRAM

## 2025-05-14 PROCEDURE — 36582 REPLACE TUNNELED CV CATH: CPT | Mod: LT,,, | Performed by: SURGERY

## 2025-05-14 PROCEDURE — 63600175 PHARM REV CODE 636 W HCPCS: Performed by: STUDENT IN AN ORGANIZED HEALTH CARE EDUCATION/TRAINING PROGRAM

## 2025-05-14 DEVICE — CATH PORT-A-CATH: Type: IMPLANTABLE DEVICE | Site: CHEST | Status: FUNCTIONAL

## 2025-05-14 RX ORDER — HEPARIN SODIUM 1000 [USP'U]/ML
INJECTION, SOLUTION INTRAVENOUS; SUBCUTANEOUS
Status: DISCONTINUED | OUTPATIENT
Start: 2025-05-14 | End: 2025-05-14 | Stop reason: HOSPADM

## 2025-05-14 RX ORDER — HYDROMORPHONE HYDROCHLORIDE 1 MG/ML
0.2 INJECTION, SOLUTION INTRAMUSCULAR; INTRAVENOUS; SUBCUTANEOUS EVERY 5 MIN PRN
Status: DISCONTINUED | OUTPATIENT
Start: 2025-05-14 | End: 2025-05-14 | Stop reason: HOSPADM

## 2025-05-14 RX ORDER — DIPHENHYDRAMINE HYDROCHLORIDE 50 MG/ML
12.5 INJECTION, SOLUTION INTRAMUSCULAR; INTRAVENOUS
Status: DISCONTINUED | OUTPATIENT
Start: 2025-05-14 | End: 2025-05-14 | Stop reason: HOSPADM

## 2025-05-14 RX ORDER — GLUCAGON 1 MG
1 KIT INJECTION
Status: DISCONTINUED | OUTPATIENT
Start: 2025-05-14 | End: 2025-05-14 | Stop reason: HOSPADM

## 2025-05-14 RX ORDER — ONDANSETRON HYDROCHLORIDE 2 MG/ML
4 INJECTION, SOLUTION INTRAVENOUS DAILY PRN
Status: DISCONTINUED | OUTPATIENT
Start: 2025-05-14 | End: 2025-05-14 | Stop reason: HOSPADM

## 2025-05-14 RX ORDER — DEXAMETHASONE SODIUM PHOSPHATE 4 MG/ML
INJECTION, SOLUTION INTRA-ARTICULAR; INTRALESIONAL; INTRAMUSCULAR; INTRAVENOUS; SOFT TISSUE
Status: DISCONTINUED | OUTPATIENT
Start: 2025-05-14 | End: 2025-05-14

## 2025-05-14 RX ORDER — MIDAZOLAM HYDROCHLORIDE 1 MG/ML
INJECTION INTRAMUSCULAR; INTRAVENOUS
Status: DISCONTINUED | OUTPATIENT
Start: 2025-05-14 | End: 2025-05-14

## 2025-05-14 RX ORDER — SODIUM CHLORIDE, SODIUM LACTATE, POTASSIUM CHLORIDE, CALCIUM CHLORIDE 600; 310; 30; 20 MG/100ML; MG/100ML; MG/100ML; MG/100ML
INJECTION, SOLUTION INTRAVENOUS CONTINUOUS PRN
Status: DISCONTINUED | OUTPATIENT
Start: 2025-05-14 | End: 2025-05-14

## 2025-05-14 RX ORDER — CEFAZOLIN SODIUM 1 G/3ML
INJECTION, POWDER, FOR SOLUTION INTRAMUSCULAR; INTRAVENOUS
Status: DISCONTINUED | OUTPATIENT
Start: 2025-05-14 | End: 2025-05-14

## 2025-05-14 RX ORDER — OXYCODONE HYDROCHLORIDE 5 MG/1
5 TABLET ORAL
Status: DISCONTINUED | OUTPATIENT
Start: 2025-05-14 | End: 2025-05-14 | Stop reason: HOSPADM

## 2025-05-14 RX ORDER — LIDOCAINE HYDROCHLORIDE 10 MG/ML
INJECTION, SOLUTION INFILTRATION; PERINEURAL
Status: DISCONTINUED | OUTPATIENT
Start: 2025-05-14 | End: 2025-05-14 | Stop reason: HOSPADM

## 2025-05-14 RX ORDER — DEXMEDETOMIDINE HYDROCHLORIDE 100 UG/ML
INJECTION, SOLUTION INTRAVENOUS
Status: DISCONTINUED | OUTPATIENT
Start: 2025-05-14 | End: 2025-05-14

## 2025-05-14 RX ORDER — MEPERIDINE HYDROCHLORIDE 50 MG/ML
12.5 INJECTION INTRAMUSCULAR; INTRAVENOUS; SUBCUTANEOUS EVERY 10 MIN PRN
Status: DISCONTINUED | OUTPATIENT
Start: 2025-05-14 | End: 2025-05-14 | Stop reason: HOSPADM

## 2025-05-14 RX ORDER — FAMOTIDINE 10 MG/ML
INJECTION, SOLUTION INTRAVENOUS
Status: DISCONTINUED | OUTPATIENT
Start: 2025-05-14 | End: 2025-05-14

## 2025-05-14 RX ORDER — ONDANSETRON HYDROCHLORIDE 2 MG/ML
INJECTION, SOLUTION INTRAVENOUS
Status: DISCONTINUED | OUTPATIENT
Start: 2025-05-14 | End: 2025-05-14

## 2025-05-14 RX ORDER — FENTANYL CITRATE 50 UG/ML
INJECTION, SOLUTION INTRAMUSCULAR; INTRAVENOUS
Status: DISCONTINUED | OUTPATIENT
Start: 2025-05-14 | End: 2025-05-14

## 2025-05-14 RX ORDER — PROPOFOL 10 MG/ML
VIAL (ML) INTRAVENOUS
Status: DISCONTINUED | OUTPATIENT
Start: 2025-05-14 | End: 2025-05-14

## 2025-05-14 RX ADMIN — DEXMEDETOMIDINE HYDROCHLORIDE 4 MCG: 100 INJECTION, SOLUTION INTRAVENOUS at 12:05

## 2025-05-14 RX ADMIN — DEXMEDETOMIDINE HYDROCHLORIDE 6 MCG: 100 INJECTION, SOLUTION INTRAVENOUS at 12:05

## 2025-05-14 RX ADMIN — FENTANYL CITRATE 50 MCG: 50 INJECTION, SOLUTION INTRAMUSCULAR; INTRAVENOUS at 12:05

## 2025-05-14 RX ADMIN — ONDANSETRON 4 MG: 2 INJECTION INTRAMUSCULAR; INTRAVENOUS at 12:05

## 2025-05-14 RX ADMIN — CEFAZOLIN 2 G: 330 INJECTION, POWDER, FOR SOLUTION INTRAMUSCULAR; INTRAVENOUS at 12:05

## 2025-05-14 RX ADMIN — PROPOFOL 30 MG: 10 INJECTION, EMULSION INTRAVENOUS at 12:05

## 2025-05-14 RX ADMIN — FAMOTIDINE 20 MG: 10 INJECTION, SOLUTION INTRAVENOUS at 12:05

## 2025-05-14 RX ADMIN — DEXAMETHASONE SODIUM PHOSPHATE 4 MG: 4 INJECTION, SOLUTION INTRAMUSCULAR; INTRAVENOUS at 12:05

## 2025-05-14 RX ADMIN — PROPOFOL 50 MG: 10 INJECTION, EMULSION INTRAVENOUS at 12:05

## 2025-05-14 RX ADMIN — MIDAZOLAM HYDROCHLORIDE 2 MG: 1 INJECTION, SOLUTION INTRAMUSCULAR; INTRAVENOUS at 12:05

## 2025-05-14 RX ADMIN — SODIUM CHLORIDE, SODIUM LACTATE, POTASSIUM CHLORIDE, AND CALCIUM CHLORIDE: .6; .31; .03; .02 INJECTION, SOLUTION INTRAVENOUS at 12:05

## 2025-05-14 NOTE — OP NOTE
DATE OF PROCEDURE: 05/14/2025    PREOPERATIVE DIAGNOSIS:   1. Adenocarcinoma of the GE junction  2. 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 Removal of left IJ Port-A-Cath Placement  2. Placement of left IJ Port-A-Cath placement    SURGEON: Patrick Whelan M.D.    ASSISTANT: None    ANESTHESIA: Local MAC    ESTIMATED BLOOD LOSS: More    INDICATION: Access for infusions    FINDINGS:  - existing port and catheter removed, no signs of infection  -new wire easily passed and confirmed in the venous system on fluoro  -  Catheter tip positioned at the atrial caval junction under fluoro  -  Port aspirated and flushed with ease.    PROCEDURE IN DETAIL:  The patient was identified in the preoperative unit and taken back to the operating room and laid supine on the operating room table. IV antibiotics were administered prior to the start of anesthesia. Local MAC anesthesia was administered without complication. The patient was then prepped and draped in the standard sterile fashion. Time-out performed by members of the operative team.    The patient has had existing left IJ port a catheter in place which apparently had occlusion of the catheter.  This has no sign of infection.  Local anesthetic was injected at the neck insertion site in the chest insertion site. Both of these areas were reopened sharply. Initially I delivered the existing port through the chest incision.  This has no sign of infection.  The catheter was clamped and the port was removed. I then isolated the catheter at the neck and then delivered it through this incision pulling it up from the chest. This has no back bleeding. X-ray was used confirmed catheter was in appropriate position. I then threaded a wire down the catheter that has able to pass it beyond and into the venous system of the catheter was removed over the wire.  We confirmed the wire remained within the venous system. From this area I then  advanced a dilator and sheath over the wire under fluoroscopic guidance. This has mild kinking at the junction of the jugular and subclavian vein but we are able to pass this area. And then place the catheter on a tunneler and tunneled it from the chest incision to the neck incision. The catheter was then advanced through the sheath and the tip was positioned under fluoroscopic guidance at the atrial caval junction.  The sheath was peeled away. Catheter was cut to size and the port was attached. I elected to suture the port to the previous pocket to prevent flipping with Vicryl suture. The sutures were placed in the port was placed within the subcutaneous pocket these were tied down.  Fluoroscopic imaging was used to confirm catheter tip was then appropriate position and there were no kinks in the line and the port was appropriate. Port was then  accessed and easily aspirated blood. It was then flushed with heparinized saline. Hemostasis was assured and the dermis of the port pocket was re-approximated with 3-0 vicryl suture in an interrupted fashion. The skin was re-approximated using 4-0 monocryl suture in a running fashion. Dermabond was then applied. The patient was awakened from anesthesia without complication and returned to the postoperative recovery unit in stable condition. At the end of the case, sponge, instrument, and needle counts were correct and hemostasis was achieved. I was present and scrubbed throughout the entirety of the case. A post-operative CXR will be obtained to confirm appropriate catheter location and to rule out pneumothorax.      All images were personally interpreted by me and stored.    COMPLICATIONS: None    CONDITION:  Stable    DISPO: To PACU then home after CXR    Patrick Whelan Jr

## 2025-05-14 NOTE — H&P
GENERAL SURGERY  OUTPATIENT H&P    REASON FOR VISIT/CC: Port a catheter placement    HPI: Kwasi Brown is a 59 y.o. female known to me for placement of a port and G-tube on 01/30/2025 for adenocarcinoma of the GE junction. During recent infusion patient developed pain and there were unable to aspirate or flush. Sent for fluoroscopic imaging which showed occlusion of the catheter line. Patient was contacted on the phone agreed proceed with port revision versus replacement today.    I have reviewed the patient's chart including prior progress notes, procedures and testing.     ROS:   Review of Systems    PROBLEM LIST:  Problem List[1]      HISTORY  Past Medical History:   Diagnosis Date    Esophageal cancer 01/04/2025    Hypertension 2009    Obesity        Past Surgical History:   Procedure Laterality Date    ADENOIDECTOMY      COLONOSCOPY W/ POLYPECTOMY  1989    Age 24 - polyps negative    HYSTERECTOMY  2009    Partial Hysterectomy    INSERTION OF TUNNELED CENTRAL VENOUS CATHETER (CVC) WITH SUBCUTANEOUS PORT Left 1/30/2025    Procedure: [id number];  Surgeon: Patrick Whelan Jr., MD;  Location: [facility] OR;  Service: General;  Laterality: Left;    INSERTION, GASTROSTOMY TUBE, LAPAROSCOPIC N/A 1/30/2025    Procedure: INSERTION, GASTROSTOMY TUBE, LAPAROSCOPIC;  Surgeon: Patrick Whelan Jr., MD;  Location: [facility] OR;  Service: General;  Laterality: N/A;    KNEE RECONSTRUCTION, MEDIAL PATELLAR FEMORAL LIGAMENT Bilateral     left ALSO RIGHT KNEE SCOPE    TONSILLECTOMY         Social History[2]    Family History   Problem Relation Name Age of Onset    Tuberculosis Mother      COPD Mother      Heart disease Mother  70    Hypertension Mother      Hypertension Father      Heart disease Father  78    Diabetes Father      Diabetes Brother  1        type 1    Heart disease Brother      Hypertension Brother      Coronary artery disease Unknown      Hypertension Unknown      Diabetes Unknown            MEDS:  Medications Ordered Prior to Encounter[3]    ALLERGIES:  Review of patient's allergies indicates:   Allergen Reactions    Cipro [ciprofloxacin hcl]      States is scared to take it         VITALS:  Vitals:    05/14/25 1007   BP: 130/75   Pulse:    Resp:    Temp:          PHYSICAL EXAM:  Physical Exam  Vitals reviewed.   Constitutional:       General: She is not in acute distress.     Appearance: Normal appearance. She is well-developed.   HENT:      Head: Normocephalic and atraumatic.      Nose: Nose normal.   Eyes:      General: No scleral icterus.     Conjunctiva/sclera: Conjunctivae normal.   Neck:      Trachea: No tracheal tenderness or tracheal deviation.      Comments: Left IJ port in place without signs of infection or significant underlying fluid collection  Cardiovascular:      Rate and Rhythm: Normal rate and regular rhythm.      Pulses: Normal pulses.   Pulmonary:      Effort: Pulmonary effort is normal. No accessory muscle usage or respiratory distress.      Breath sounds: Normal breath sounds.   Abdominal:      General: There is no distension.      Palpations: Abdomen is soft.      Tenderness: There is no abdominal tenderness.   Musculoskeletal:         General: No swelling or tenderness. Normal range of motion.      Cervical back: Normal range of motion and neck supple. No rigidity.   Skin:     General: Skin is warm and dry.      Coloration: Skin is not jaundiced.      Findings: No erythema.   Neurological:      General: No focal deficit present.      Mental Status: She is alert and oriented to person, place, and time.      Motor: No weakness or abnormal muscle tone.   Psychiatric:         Mood and Affect: Mood normal.         Behavior: Behavior normal.         Thought Content: Thought content normal.         Judgment: Judgment normal.           LABS:  Lab Results   Component Value Date    WBC 14.25 (H) 04/25/2025    RBC 3.29 (L) 04/25/2025    HGB 8.5 (L) 04/25/2025    HCT 29.2 (L)  2025    PLT 98 (L) 2025     Lab Results   Component Value Date     2025     (L) 2025    K 4.1 2025     2025    CO2 25 2025    BUN 15 2025    CREATININE 0.7 2025    CALCIUM 8.0 (L) 2025     Lab Results   Component Value Date    ALT 22 2025    AST 29 2025    ALKPHOS 97 2025    BILITOT 0.3 2025     Lab Results   Component Value Date    MG 1.6 2025    PHOS 3.8 2025       ASSESSMENT & PLAN:  59 y.o. female with GE junction adenocarcinoma, malfunctioning port  -patient with a occlusion in the port line unable to be cleared  -will plan for revision versus replacement today  -Ancef on-call               [1]   Patient Active Problem List  Diagnosis    Hypertension    LBBB (left bundle branch block)    ARREGUIN (dyspnea on exertion)    Essential hypertension    Dyslipidemia    Esophageal mass    Malignant neoplasm of abdominal esophagus    Dehydration    Hypokalemia    Anemia    Hyponatremia    Febrile neutropenia    Pancytopenia    Malnutrition of mild degree    Thrombocytopenia    Severe malnutrition    Hypomagnesemia    Chemotherapy-induced neutropenia    After care    Thrombosis involving vascular device   [2]   Social History  Tobacco Use    Smoking status: Former     Current packs/day: 0.00     Average packs/day: 0.3 packs/day for 12.0 years (3.0 ttl pk-yrs)     Types: Cigarettes     Start date: 1971     Quit date: 1983     Years since quittin.7    Smokeless tobacco: Never   Substance Use Topics    Alcohol use: Yes     Comment: occasional    Drug use: No   [3]   No current facility-administered medications on file prior to encounter.     Current Outpatient Medications on File Prior to Encounter   Medication Sig Dispense Refill    ALPRAZolam (XANAX) 0.5 MG tablet Take 0.5 mg by mouth 3 times daily as needed.      aluminum & magnesium hydroxide-simethicone (MYLANTA MAX STRENGTH) 400-400-40 mg/5 mL  suspension Take 10 mLs by mouth every 6 (six) hours as needed for Indigestion.      azelastine (ASTELIN) 137 mcg (0.1 %) nasal spray 2 sprays by Nasal route 2 (two) times daily.      cetirizine (ZYRTEC) 10 MG tablet Take 10 mg by mouth once daily.      famotidine (PEPCID) 40 MG tablet TAKE 2 TABLETS (80 MG TOTAL) BY MOUTH EVERY EVENING. 180 tablet 1    HYDROcodone-acetaminophen (NORCO)  mg per tablet Take 1 tablet by mouth every 6 (six) hours as needed for Pain. 60 tablet 0    lysine 1,000 mg Tab Take 1 tablet by mouth Daily.      multivitamin capsule Take 1 capsule by mouth once daily.      ondansetron (ZOFRAN) 8 MG tablet Take 1 tablet (8 mg total) by mouth every 12 (twelve) hours as needed for Nausea. 30 tablet 2    pantoprazole (PROTONIX) 20 MG tablet TAKE 1 TABLET BY MOUTH EVERY DAY 90 tablet 1    potassium chloride SA (K-DUR,KLOR-CON) 20 MEQ tablet Take 1 tablet (20 mEq total) by mouth once daily. 30 tablet 11    promethazine (PHENERGAN) 25 MG tablet Take 1 tablet (25 mg total) by mouth every 6 (six) hours as needed for Nausea. 30 tablet 1    albuterol sulfate (PROAIR DIGIHALER) 90 mcg/actuation aebs Inhale 2 puffs every 4 hours by inhalation route.      [Paused] benazepril-hydrochlorthiazide (LOTENSIN HCT) 20-12.5 mg per tablet Take 1 tablet by mouth once daily. 90 tablet 3    capecitabine (XELODA) 500 MG Tab Take 4 tablets (2,000 mg total) by mouth 2 (two) times daily Take for 14 days and then stop for 7 days. 112 tablet 5    fluticasone propionate (FLONASE) 50 mcg/actuation nasal spray 1 spray by Each Nostril route once daily.      LIDOcaine-prilocaine (EMLA) cream Apply topically as needed (Apply to port site 30 minutes before access as needed). 30 g 0    LORazepam (ATIVAN) 0.5 MG tablet Take 1 tablet (0.5 mg total) by mouth every 6 (six) hours as needed for Anxiety (Nausea and vomiting). 120 tablet 0    OLANZapine (ZYPREXA) 5 MG tablet Take 1 tablet the night of chemotherapy and for the following 3  nights. 30 tablet 2    scopolamine (TRANSDERM-SCOP) 1.3-1.5 mg (1 mg over 3 days) Place 1 patch onto the skin every 72 hours. 10 patch 1

## 2025-05-14 NOTE — ANESTHESIA PREPROCEDURE EVALUATION
05/14/2025  Kwasi Brown is a 59 y.o., female.    Patient Active Problem List   Diagnosis    Hypertension    LBBB (left bundle branch block)    ARREGUIN (dyspnea on exertion)    Essential hypertension    Dyslipidemia    Esophageal mass    Malignant neoplasm of abdominal esophagus    Dehydration    Hypokalemia    Anemia    Hyponatremia    Febrile neutropenia    Pancytopenia    Malnutrition of mild degree    Thrombocytopenia    Severe malnutrition    Hypomagnesemia    Chemotherapy-induced neutropenia    After care    Thrombosis involving vascular device       Past Surgical History:   Procedure Laterality Date    ADENOIDECTOMY      COLONOSCOPY W/ POLYPECTOMY  1989    Age 24 - polyps negative    HYSTERECTOMY  2009    Partial Hysterectomy    INSERTION OF TUNNELED CENTRAL VENOUS CATHETER (CVC) WITH SUBCUTANEOUS PORT Left 1/30/2025    Procedure: [id number];  Surgeon: Patrick Whelan Jr., MD;  Location: [facility] OR;  Service: General;  Laterality: Left;    INSERTION, GASTROSTOMY TUBE, LAPAROSCOPIC N/A 1/30/2025    Procedure: INSERTION, GASTROSTOMY TUBE, LAPAROSCOPIC;  Surgeon: Patrick Whelan Jr., MD;  Location: [facility] OR;  Service: General;  Laterality: N/A;    KNEE RECONSTRUCTION, MEDIAL PATELLAR FEMORAL LIGAMENT Bilateral     left ALSO RIGHT KNEE SCOPE    TONSILLECTOMY          Tobacco Use:  The patient  reports that she quit smoking about 41 years ago. Her smoking use included cigarettes. She started smoking about 53 years ago. She has a 3 pack-year smoking history. She has never used smokeless tobacco.     Results for orders placed or performed during the hospital encounter of 03/19/25   EKG 12-lead    Collection Time: 03/24/25 10:54 AM   Result Value Ref Range    QRS Duration 144 ms    OHS QTC Calculation 497 ms    Narrative    Test Reason : R07.9,    Vent. Rate :  86 BPM     Atrial Rate :  86  BPM     P-R Int : 140 ms          QRS Dur : 144 ms      QT Int : 416 ms       P-R-T Axes :   7 -40 119 degrees    QTcB Int : 497 ms    Normal sinus rhythm  Left axis deviation  Left bundle branch block  Abnormal ECG  Confirmed by Mike Rehman ([id number]) on 3/24/2025 4:06:48 PM    Referred By: AAAREFERRAL SELF           Confirmed By: Mike Rehman             Lab Results   Component Value Date    WBC 14.25 (H) 04/25/2025    HGB 8.5 (L) 04/25/2025    HCT 29.2 (L) 04/25/2025    MCV 89 04/25/2025    PLT 98 (L) 04/25/2025     BMP  Lab Results   Component Value Date     (L) 04/25/2025    K 4.1 04/25/2025     04/25/2025    CO2 25 04/25/2025    BUN 15 04/25/2025    CREATININE 0.7 04/25/2025    CALCIUM 8.0 (L) 04/25/2025    ANIONGAP 7 (L) 04/25/2025     04/25/2025     (H) 04/11/2025     (H) 03/28/2025       No results found for this or any previous visit.            Pre-op Assessment    I have reviewed the Patient Summary Reports.     I have reviewed the Nursing Notes. I have reviewed the NPO Status.   I have reviewed the Medications.     Review of Systems  Anesthesia Hx:  No problems with previous Anesthesia             Denies Family Hx of Anesthesia complications.    Denies Personal Hx of Anesthesia complications.                    Social:  Non-Smoker       Hematology/Oncology:  Hematology Normal                     Current/Recent Cancer. (Esophageal)         radiation       EENT/Dental:  EENT/Dental Normal           Cardiovascular:     Hypertension    Dysrhythmias (LBBB)       hyperlipidemia ARREGUIN  ECG has been reviewed.                            Pulmonary:  Pulmonary Normal                       Renal/:  Renal/ Normal                 Hepatic/GI:  Hepatic/GI Normal                    Musculoskeletal:  Musculoskeletal Normal                Neurological:  Neurology Normal                                      Endocrine:  Endocrine Normal            Psych:  Psychiatric Normal                     Physical Exam  General: Well nourished, Cooperative, Alert and Oriented    Airway:  Mallampati: II   Mouth Opening: Normal  TM Distance: Normal  Tongue: Normal  Neck ROM: Normal ROM    Dental:  Intact    Chest/Lungs:  Clear to auscultation, Normal Respiratory Rate    Heart:  Rate: Normal  Rhythm: Regular Rhythm        Anesthesia Plan  Type of Anesthesia, risks & benefits discussed:    Anesthesia Type: Gen Natural Airway  Intra-op Monitoring Plan: Standard ASA Monitors  Post Op Pain Control Plan: IV/PO Opioids PRN and multimodal analgesia  Induction:  IV  Informed Consent: Informed consent signed with the Patient and all parties understand the risks and agree with anesthesia plan.  All questions answered.   ASA Score: 3  Anesthesia Plan Notes:   GNA - poss LMA if needed  IV tylenol  Zofran Pepcid Decadron    Ready For Surgery From Anesthesia Perspective.     .

## 2025-05-14 NOTE — DISCHARGE SUMMARY
[facility]  Discharge Note  Short Stay    Procedure(s) (LRB):  REMOVAL, VASCULAR ACCESS PORT (Left)  INSERTION, VENOUS ACCESS PORT (Left)      OUTCOME: Patient tolerated treatment/procedure well without complication and is now ready for discharge.    DISPOSITION: Home or Self Care    FINAL DIAGNOSIS:  <principal problem not specified>    FOLLOWUP: In clinic    DISCHARGE INSTRUCTIONS:    Discharge Procedure Orders   Diet Adult Regular     Ice to affected area     Lifting restrictions   Order Comments: Please avoid lifting greater than 40 lb, straining, strenuous activity for one week.     Change dressing (specify)   Order Comments: Post-Operative Wound Care    A surgical glue has been placed over your incisions, please leave the glue in place and do not attempt to remove it.  It is ok to shower using mild soap and water over the incisions the day after your procedure. Pat dry your incisions.      If you noticed redness, swelling, fever, increasing pain or significant drainage from your wound please call/message the office or the 24 hr nurse hotline after hours.     Notify your health care provider if you experience any of the following:  temperature >100.4     Notify your health care provider if you experience any of the following:  persistent nausea and vomiting or diarrhea     Notify your health care provider if you experience any of the following:  severe uncontrolled pain     Notify your health care provider if you experience any of the following:  redness, tenderness, or signs of infection (pain, swelling, redness, odor or green/yellow discharge around incision site)     Notify your health care provider if you experience any of the following:  worsening rash     Notify your health care provider if you experience any of the following:  increased confusion or weakness     Shower on day dressing removed (No bath)        TIME SPENT ON DISCHARGE: 10 minutes

## 2025-05-14 NOTE — TRANSFER OF CARE
"Anesthesia Transfer of Care Note    Patient: Kwasi Brown    Procedure(s) Performed: Procedure(s) (LRB):  REMOVAL, VASCULAR ACCESS PORT (Left)  INSERTION, VENOUS ACCESS PORT (Left)    Patient location: OPS    Anesthesia Type: general    Transport from OR: Transported from OR on room air with adequate spontaneous ventilation    Post pain: adequate analgesia    Post assessment: no apparent anesthetic complications and tolerated procedure well    Post vital signs: stable    Level of consciousness: awake and alert    Nausea/Vomiting: no nausea/vomiting    Complications: none    Transfer of care protocol was followed    Last vitals: Visit Vitals  /75   Pulse 72   Temp 36.6 °C (97.8 °F) (Oral)   Resp 16   Ht 5' 6" (1.676 m)   Wt 83.9 kg (185 lb)   SpO2 97%   Breastfeeding No   BMI 29.86 kg/m²     "

## 2025-05-14 NOTE — PLAN OF CARE
1305- pt is alert and oriented breathing even unlabored with no complaints of pain. Surgical site is clean and dry with no redness or swelling noted. 1330- pt is sitting up drinking fluids with family at the bedside. 1400- pt tolerated po intake with no n/v. Surgical site is clean and dry with no redness or swelling noted. Detailed discharge instructions given to the pt.

## 2025-05-14 NOTE — ANESTHESIA POSTPROCEDURE EVALUATION
Anesthesia Post Evaluation    Patient: Kwasi Brown    Procedure(s) Performed: Procedure(s) (LRB):  REMOVAL, VASCULAR ACCESS PORT (Left)  INSERTION, VENOUS ACCESS PORT (Left)    Final Anesthesia Type: general      Patient location during evaluation: OPS  Patient participation: Yes- Able to Participate  Level of consciousness: awake and alert and oriented  Post-procedure vital signs: reviewed and stable  Pain management: adequate  Airway patency: patent    PONV status at discharge: No PONV  Anesthetic complications: no      Cardiovascular status: blood pressure returned to baseline and hemodynamically stable  Respiratory status: unassisted, spontaneous ventilation and room air  Hydration status: euvolemic  Follow-up not needed.              Vitals Value Taken Time   /68 05/14/25 13:00   Temp 36.6  05/14/25 13:00   Pulse 70 05/14/25 13:00   Resp 16 05/14/25 13:00   SpO2 97 % 05/14/25 13:00         No case tracking events are documented in the log.      Pain/Pricila Score: No data recorded

## 2025-05-17 DIAGNOSIS — T45.1X5A CHEMOTHERAPY-INDUCED NAUSEA: ICD-10-CM

## 2025-05-17 DIAGNOSIS — R11.0 CHEMOTHERAPY-INDUCED NAUSEA: ICD-10-CM

## 2025-05-19 RX ORDER — OLANZAPINE 5 MG/1
TABLET, FILM COATED ORAL
Qty: 90 TABLET | Refills: 0 | Status: SHIPPED | OUTPATIENT
Start: 2025-05-19

## 2025-05-26 NOTE — PROGRESS NOTES
Service Date:  5/26/25    Chief Complaint:  Esophageal carcinoma     Kwasi Brown is a 59 y.o. female here with its esophageal carcinoma.      Here to start on new chemotherapy. Doing ok. Had port placed about 2 weeks ago.  Having issues with nausea.  Especially today.  Bilirubin noted to be elevated.  States that the nausea started recently about a few days ago.    Review of Systems   Constitutional:  Positive for appetite change. Negative for unexpected weight change.   HENT: Negative.  Negative for mouth sores.    Eyes: Negative.  Negative for visual disturbance.   Respiratory:  Positive for cough. Negative for shortness of breath.    Cardiovascular: Negative.  Negative for chest pain.   Gastrointestinal:  Positive for diarrhea. Negative for abdominal pain.   Endocrine: Negative.    Genitourinary: Negative.  Negative for frequency.   Musculoskeletal:  Positive for back pain.   Integumentary:  Negative for rash. Negative.   Neurological: Negative.  Negative for headaches.   Hematological: Negative.  Negative for adenopathy.   Psychiatric/Behavioral: Negative.  The patient is not nervous/anxious.         Current Outpatient Medications   Medication Instructions    albuterol sulfate (PROAIR DIGIHALER) 90 mcg/actuation aebs Inhale 2 puffs every 4 hours by inhalation route.    ALPRAZolam (XANAX) 0.5 mg, 3 times daily PRN    aluminum & magnesium hydroxide-simethicone (MYLANTA MAX STRENGTH) 400-400-40 mg/5 mL suspension 10 mLs, Every 6 hours PRN    azelastine (ASTELIN) 137 mcg (0.1 %) nasal spray 2 sprays, 2 times daily    [Paused] benazepril-hydrochlorthiazide (LOTENSIN HCT) 20-12.5 mg per tablet 1 tablet, Oral, Daily    capecitabine (XELODA) 1,000 mg/m2, Oral, 2 times daily, Take for 14 days and then stop for 7 days    cetirizine (ZYRTEC) 10 mg, Daily    famotidine (PEPCID) 80 mg, Oral, Nightly    fluticasone propionate (FLONASE) 50 mcg/actuation nasal spray 1 spray, Daily    HYDROcodone-acetaminophen (NORCO)   mg per tablet 1 tablet, Oral, Every 6 hours PRN    LIDOcaine-prilocaine (EMLA) cream Topical (Top), As needed (PRN)    LORazepam (ATIVAN) 0.5 mg, Oral, Every 6 hours PRN    lysine 1,000 mg Tab 1 tablet, Daily    multivitamin capsule 1 capsule, Daily    OLANZapine (ZYPREXA) 5 MG tablet TAKE 1 TABLET THE NIGHT OF CHEMOTHERAPY AND FOR THE FOLLOWING 3 NIGHTS.    ondansetron (ZOFRAN) 8 mg, Oral, Every 12 hours PRN    pantoprazole (PROTONIX) 20 mg, Oral    potassium chloride SA (K-DUR,KLOR-CON) 20 MEQ tablet 20 mEq, Oral, Daily    promethazine (PHENERGAN) 25 mg, Oral, Every 6 hours PRN    scopolamine (TRANSDERM-SCOP) 1.3-1.5 mg (1 mg over 3 days) 1 patch, Transdermal, Every 72 hours        Past Medical History:   Diagnosis Date    Esophageal cancer 01/04/2025    Hypertension 2009    Obesity         Past Surgical History:   Procedure Laterality Date    ADENOIDECTOMY      COLONOSCOPY W/ POLYPECTOMY  1989    Age 24 - polyps negative    HYSTERECTOMY  2009    Partial Hysterectomy    INSERTION OF TUNNELED CENTRAL VENOUS CATHETER (CVC) WITH SUBCUTANEOUS PORT Left 1/30/2025    Procedure: [id number];  Surgeon: Patrick Whelan Jr., MD;  Location: [facility];  Service: General;  Laterality: Left;    INSERTION OF VENOUS ACCESS PORT Left 5/14/2025    Procedure: INSERTION, VENOUS ACCESS PORT;  Surgeon: Patrick Whelan Jr., MD;  Location: [facility] OR;  Service: General;  Laterality: Left;    INSERTION, GASTROSTOMY TUBE, LAPAROSCOPIC N/A 1/30/2025    Procedure: INSERTION, GASTROSTOMY TUBE, LAPAROSCOPIC;  Surgeon: Patrick Whelan Jr., MD;  Location: [facility] OR;  Service: General;  Laterality: N/A;    KNEE RECONSTRUCTION, MEDIAL PATELLAR FEMORAL LIGAMENT Bilateral     left ALSO RIGHT KNEE SCOPE    REMOVAL OF VASCULAR ACCESS PORT Left 5/14/2025    Procedure: REMOVAL, VASCULAR ACCESS PORT;  Surgeon: Patrick Whelan Jr., MD;  Location: [facility] OR;  Service: General;  Laterality: Left;    TONSILLECTOMY           Family History   Problem Relation Name Age of Onset    Tuberculosis Mother      COPD Mother      Heart disease Mother  70    Hypertension Mother      Hypertension Father      Heart disease Father  78    Diabetes Father      Diabetes Brother  1        type 1    Heart disease Brother      Hypertension Brother      Coronary artery disease Unknown      Hypertension Unknown      Diabetes Unknown         Social History     Tobacco Use    Smoking status: Former     Current packs/day: 0.00     Average packs/day: 0.3 packs/day for 12.0 years (3.0 ttl pk-yrs)     Types: Cigarettes     Start date: 1971     Quit date: 1983     Years since quittin.7    Smokeless tobacco: Never   Substance Use Topics    Alcohol use: Yes     Comment: occasional    Drug use: No         There were no vitals filed for this visit.       Physical Exam:  There were no vitals taken for this visit.    Physical Exam  Constitutional:       Appearance: Normal appearance.   HENT:      Head: Normocephalic and atraumatic.      Nose: Nose normal.      Mouth/Throat:      Mouth: Mucous membranes are moist.      Pharynx: Oropharynx is clear.   Eyes:      Conjunctiva/sclera: Conjunctivae normal.   Cardiovascular:      Rate and Rhythm: Normal rate and regular rhythm.      Heart sounds: Normal heart sounds.   Pulmonary:      Effort: Pulmonary effort is normal.      Breath sounds: Normal breath sounds.   Abdominal:      General: Abdomen is flat. Bowel sounds are normal.      Palpations: Abdomen is soft.   Musculoskeletal:         General: Normal range of motion.      Cervical back: Normal range of motion and neck supple.   Skin:     General: Skin is warm and dry.   Neurological:      General: No focal deficit present.      Mental Status: She is alert and oriented to person, place, and time. Mental status is at baseline.   Psychiatric:         Mood and Affect: Mood normal.          Labs:  Lab Results   Component Value Date    WBC 14.25 (H) 2025     RBC 3.29 (L) 04/25/2025    HGB 8.5 (L) 04/25/2025    HCT 29.2 (L) 04/25/2025    MCV 89 04/25/2025    MCH 25.8 (L) 04/25/2025    MCHC 29.1 (L) 04/25/2025    RDW 20.5 (H) 04/25/2025    PLT 98 (L) 04/25/2025    MPV 10.7 04/25/2025    GRAN 1.4 (L) 03/21/2025    GRAN 49.8 03/21/2025    LYMPH 0.6 (L) 03/21/2025    LYMPH 19.6 03/21/2025    MONO 0.7 03/21/2025    MONO 25.3 (H) 03/21/2025    EOS 0.0 03/21/2025    BASO 0.02 03/21/2025    EOSINOPHIL 0.0 03/21/2025    BASOPHIL 0.7 03/21/2025     Sodium   Date Value Ref Range Status   04/25/2025 132 (L) 136 - 145 mmol/L Final     Potassium   Date Value Ref Range Status   04/25/2025 4.1 3.5 - 5.1 mmol/L Final     Chloride   Date Value Ref Range Status   04/25/2025 100 95 - 110 mmol/L Final     CO2   Date Value Ref Range Status   04/25/2025 25 23 - 29 mmol/L Final     Glucose   Date Value Ref Range Status   04/25/2025 109 70 - 110 mg/dL Final     BUN   Date Value Ref Range Status   04/25/2025 15 8 - 23 mg/dL Final     Creatinine   Date Value Ref Range Status   04/25/2025 0.7 0.5 - 1.4 mg/dL Final     Calcium   Date Value Ref Range Status   04/25/2025 8.0 (L) 8.7 - 10.5 mg/dL Final     Protein Total   Date Value Ref Range Status   04/25/2025 5.2 (L) 6.0 - 8.4 gm/dL Final     Albumin   Date Value Ref Range Status   04/25/2025 2.7 (L) 3.5 - 5.2 g/dL Final     Bilirubin Total   Date Value Ref Range Status   04/25/2025 0.3 0.1 - 1.0 mg/dL Final     Comment:     For infants and newborns, interpretation of results should be based   on gestational age, weight and in agreement with clinical   observations.    Premature Infant recommended reference ranges:   0-24 hours:  <8.0 mg/dL   24-48 hours: <12.0 mg/dL   3-5 days:    <15.0 mg/dL   6-29 days:   <15.0 mg/dL     ALP   Date Value Ref Range Status   04/25/2025 97 55 - 135 unit/L Final     AST   Date Value Ref Range Status   04/25/2025 29 10 - 40 unit/L Final     ALT   Date Value Ref Range Status   04/25/2025 22 10 - 44 unit/L Final      Anion Gap   Date Value Ref Range Status   04/25/2025 7 (L) 8 - 16 mmol/L Final     eGFR if    Date Value Ref Range Status   02/23/2022 >60.0 >60 mL/min/1.73 m^2 Final     eGFR if non    Date Value Ref Range Status   02/23/2022 >60.0 >60 mL/min/1.73 m^2 Final     Comment:     Calculation used to obtain the estimated glomerular filtration  rate (eGFR) is the CKD-EPI equation.          A/P:    Poorly differentiated carcinoma of the esophagus  -PET scan unfortunately reveals a supraclavicular lymph node that is positive with a SUV of 9  -recommend palliative radiation to start with transition to chemotherapy after   -completed palliative radiation   -had 4 cycles of chemotherapy with FLOT.  Tolerated treatment pretty well.  Follow up CT scan shows good response to treatment.  I reviewed the CT scan with Dr. Anderson, who felt that the patient is not a surgical candidate given the metastases to the supraclavicular lymph node and response to chemo with this, helping to confirm metastases.    -I will now switch her over to treatment with immunotherapy including oxaliplatin and capecitabine (for convenience versus 5 FU).  This will allow for eventual switch to maintenance therapy, and allow for discontinuation of the more harsh docetaxel.  -ok for treatment    Nausea with elevated bilirubin   -I am worried if her cancer progressed while she was waiting for this new chemotherapy.  I will do a stat CT scan.    Chemotherapy-induced neutropenia   -no longer on Neulasta  -continue to monitor  -precautions given      Aurash Khoobehi, MD  Hematology and Oncology    Visit today included increased complexity associated with the care of the episodic problem esophageal cancer addressed and managing the longitudinal care of the patient due to the serious and/or complex managed problem(s).

## 2025-05-27 NOTE — PLAN OF CARE
Problem: Fatigue  Goal: Improved Activity Tolerance  Outcome: Progressing  Intervention: Promote Improved Energy  Flowsheets (Taken 5/27/2025 5846)  Fatigue Management:   fatigue-related activity identified   paced activity encouraged   frequent rest breaks encouraged  Sleep/Rest Enhancement:   noise level reduced   relaxation techniques promoted   regular sleep/rest pattern promoted  Activity Management:   Ambulated -L4   Up in chair - L3  Environmental Support:   calm environment promoted   distractions minimized   rest periods encouraged

## 2025-05-28 NOTE — TELEPHONE ENCOUNTER
Incoming call from pt's . Pt currently at [facility] ED, severe sepsis and dehydration, hypotensive and SOB, had fall this morning out of bed.  was unable to lift her, so he had to call 911 to pick her up.  called bc they are trying to transfer her, but there are no beds at [facility] and they are talking about transferring her to [facility]. Denies fever. Nivolumab # 1 yesterday, 5/27. Discussed with Dr Khoobehi as promised. He is aware, as hospitalist called him.     Spoke with pt's  to let him know that the hospitalist did talk to Dr Khoobehi and he gave her history and recs to him. Pt's  states that once an ambulance is available, pt will be sent to [place].

## 2025-05-29 NOTE — TELEPHONE ENCOUNTER
Spoke with Pat with transfer center. Gave the okay, per Dr Khoobehi to have pt transferred from [place] to [facility].

## 2025-05-30 PROBLEM — A41.9 SEPTIC SHOCK: Status: ACTIVE | Noted: 2025-01-01

## 2025-05-30 PROBLEM — D75.89 BICYTOPENIA: Status: ACTIVE | Noted: 2025-01-01

## 2025-05-30 PROBLEM — E87.29 HIGH ANION GAP METABOLIC ACIDOSIS: Status: ACTIVE | Noted: 2025-01-01

## 2025-05-30 PROBLEM — Z51.5 COMFORT MEASURES ONLY STATUS: Status: ACTIVE | Noted: 2025-01-01

## 2025-05-30 PROBLEM — K72.90 LIVER FAILURE: Status: ACTIVE | Noted: 2025-01-01

## 2025-05-30 PROBLEM — R65.21 SEPTIC SHOCK: Status: ACTIVE | Noted: 2025-01-01

## 2025-06-01 LAB — BACTERIA UR CULT: NO GROWTH

## 2025-06-03 LAB
ABO + RH BLD: NORMAL
BLD PROD TYP BPU: NORMAL
BLOOD UNIT EXPIRATION DATE: NORMAL
BLOOD UNIT TYPE CODE: 6200
CROSSMATCH INTERPRETATION: NORMAL
DISPENSE STATUS: NORMAL
FFP: NORMAL
PLT APH: NORMAL
RBCS: NORMAL
UNIT NUMBER: NORMAL

## (undated) DEVICE — COVER CAMERA OPERATING ROOM

## (undated) DEVICE — SUT VICRYL PLUS 3-0 SH 18IN

## (undated) DEVICE — TROCAR ENDO Z THREAD KII 5X100

## (undated) DEVICE — CHLORAPREP 10.5 ML APPLICATOR

## (undated) DEVICE — PACK MINOR SMH

## (undated) DEVICE — SEALER LIGASURE MARYLAND 37CM

## (undated) DEVICE — SUT VICRYL PLUS 4-0 PS2 27

## (undated) DEVICE — BLADE SURG CARBON STEEL SZ11

## (undated) DEVICE — SOL NACL IRR 1000ML BTL

## (undated) DEVICE — SUT 2-0 12-18IN SILK

## (undated) DEVICE — DRAPE C ARM 42 X 120 10/BX

## (undated) DEVICE — SYR DISP LL 5CC

## (undated) DEVICE — COVER LIGHT HANDLE 80/CA

## (undated) DEVICE — SUT MCRYL PLUS 4-0 PS2 27IN

## (undated) DEVICE — NDL ECLIPSE SAFETY 23G 1.5IN

## (undated) DEVICE — CANNULA LAP SEAL Z THRD 5X100

## (undated) DEVICE — TUBE GAST FEED Y PRT 20FR 20ML

## (undated) DEVICE — DECANTER FLUID TRNSF WHITE 9IN

## (undated) DEVICE — GLOVE SENSICARE PI SURG 7

## (undated) DEVICE — SEALER LIGASURE IMPACT 18CM

## (undated) DEVICE — ADHESIVE DERMABOND ADVANCED

## (undated) DEVICE — NDL SAFETY 25G X 1.5 ECLIPSE

## (undated) DEVICE — SUT ETHILON 2-0 BLK MONO PS

## (undated) DEVICE — DRAPE T TRNSVRS LAP 102X78X121

## (undated) DEVICE — SYS SEE SHARP SCOPE ANTIFOG

## (undated) DEVICE — SUT VICRYL 3-0 27 SH

## (undated) DEVICE — TRAY GENERAL LAPAROSCOPY SMH

## (undated) DEVICE — TROCAR KII BLLN 12MM 10CM

## (undated) DEVICE — ELECTRODE REM PLYHSV RETURN 9

## (undated) DEVICE — IMPLANTABLE DEVICE: Type: IMPLANTABLE DEVICE | Status: NON-FUNCTIONAL

## (undated) DEVICE — COVER PROBE STERILE CIVFLEX

## (undated) DEVICE — NDL PNEUMOPERITONEUM 150MM